# Patient Record
Sex: FEMALE | Race: WHITE | NOT HISPANIC OR LATINO | Employment: OTHER | RURAL
[De-identification: names, ages, dates, MRNs, and addresses within clinical notes are randomized per-mention and may not be internally consistent; named-entity substitution may affect disease eponyms.]

---

## 2013-03-26 LAB — CRC RECOMMENDATION EXT: NORMAL

## 2020-03-27 ENCOUNTER — HISTORICAL (OUTPATIENT)
Dept: ADMINISTRATIVE | Facility: HOSPITAL | Age: 60
End: 2020-03-27

## 2020-07-22 ENCOUNTER — HISTORICAL (OUTPATIENT)
Dept: ADMINISTRATIVE | Facility: HOSPITAL | Age: 60
End: 2020-07-22

## 2020-12-09 ENCOUNTER — HISTORICAL (OUTPATIENT)
Dept: ADMINISTRATIVE | Facility: HOSPITAL | Age: 60
End: 2020-12-09

## 2021-02-01 ENCOUNTER — HISTORICAL (OUTPATIENT)
Dept: ADMINISTRATIVE | Facility: HOSPITAL | Age: 61
End: 2021-02-01

## 2021-06-09 ENCOUNTER — OFFICE VISIT (OUTPATIENT)
Dept: FAMILY MEDICINE | Facility: CLINIC | Age: 61
End: 2021-06-09
Payer: MEDICARE

## 2021-06-09 VITALS
SYSTOLIC BLOOD PRESSURE: 142 MMHG | RESPIRATION RATE: 20 BRPM | BODY MASS INDEX: 27.11 KG/M2 | TEMPERATURE: 98 F | WEIGHT: 158.81 LBS | DIASTOLIC BLOOD PRESSURE: 82 MMHG | HEART RATE: 64 BPM | HEIGHT: 64 IN | OXYGEN SATURATION: 98 %

## 2021-06-09 DIAGNOSIS — Z00.00 ENCOUNTER FOR SUBSEQUENT ANNUAL WELLNESS VISIT IN MEDICARE PATIENT: Primary | ICD-10-CM

## 2021-06-09 DIAGNOSIS — Z12.31 ENCOUNTER FOR SCREENING MAMMOGRAM FOR MALIGNANT NEOPLASM OF BREAST: ICD-10-CM

## 2021-06-09 DIAGNOSIS — M79.7 FIBROMYALGIA: ICD-10-CM

## 2021-06-09 DIAGNOSIS — F32.A DEPRESSION, UNSPECIFIED DEPRESSION TYPE: ICD-10-CM

## 2021-06-09 DIAGNOSIS — I10 ESSENTIAL HYPERTENSION: ICD-10-CM

## 2021-06-09 PROCEDURE — 1160F PR REVIEW ALL MEDS BY PRESCRIBER/CLIN PHARMACIST DOCUMENTED: ICD-10-PCS | Mod: ,,, | Performed by: NURSE PRACTITIONER

## 2021-06-09 PROCEDURE — 3008F BODY MASS INDEX DOCD: CPT | Mod: CPTII,,, | Performed by: NURSE PRACTITIONER

## 2021-06-09 PROCEDURE — G0439 PR MEDICARE ANNUAL WELLNESS SUBSEQUENT VISIT: ICD-10-PCS | Mod: ,,, | Performed by: NURSE PRACTITIONER

## 2021-06-09 PROCEDURE — G0444 DEPRESSION SCREEN ANNUAL: HCPCS | Mod: ,,, | Performed by: NURSE PRACTITIONER

## 2021-06-09 PROCEDURE — 1170F FXNL STATUS ASSESSED: CPT | Mod: ,,, | Performed by: NURSE PRACTITIONER

## 2021-06-09 PROCEDURE — 1125F AMNT PAIN NOTED PAIN PRSNT: CPT | Mod: ,,, | Performed by: NURSE PRACTITIONER

## 2021-06-09 PROCEDURE — G0439 PPPS, SUBSEQ VISIT: HCPCS | Mod: ,,, | Performed by: NURSE PRACTITIONER

## 2021-06-09 PROCEDURE — G0444 PR DEPRESSION SCREENING: ICD-10-PCS | Mod: ,,, | Performed by: NURSE PRACTITIONER

## 2021-06-09 PROCEDURE — 3008F PR BODY MASS INDEX (BMI) DOCUMENTED: ICD-10-PCS | Mod: CPTII,,, | Performed by: NURSE PRACTITIONER

## 2021-06-09 PROCEDURE — 3077F PR MOST RECENT SYSTOLIC BLOOD PRESSURE >= 140 MM HG: ICD-10-PCS | Mod: CPTII,,, | Performed by: NURSE PRACTITIONER

## 2021-06-09 PROCEDURE — 1170F PR FUNCTIONAL STATUS ASSESSED: ICD-10-PCS | Mod: ,,, | Performed by: NURSE PRACTITIONER

## 2021-06-09 PROCEDURE — 3079F DIAST BP 80-89 MM HG: CPT | Mod: CPTII,,, | Performed by: NURSE PRACTITIONER

## 2021-06-09 PROCEDURE — 3079F PR MOST RECENT DIASTOLIC BLOOD PRESSURE 80-89 MM HG: ICD-10-PCS | Mod: CPTII,,, | Performed by: NURSE PRACTITIONER

## 2021-06-09 PROCEDURE — 1036F PR CURRENT TOBACCO NON-USER: ICD-10-PCS | Mod: ,,, | Performed by: NURSE PRACTITIONER

## 2021-06-09 PROCEDURE — 1036F TOBACCO NON-USER: CPT | Mod: ,,, | Performed by: NURSE PRACTITIONER

## 2021-06-09 PROCEDURE — 1160F RVW MEDS BY RX/DR IN RCRD: CPT | Mod: ,,, | Performed by: NURSE PRACTITIONER

## 2021-06-09 PROCEDURE — 3077F SYST BP >= 140 MM HG: CPT | Mod: CPTII,,, | Performed by: NURSE PRACTITIONER

## 2021-06-09 PROCEDURE — 1125F PR PAIN SEVERITY QUANTIFIED, PAIN PRESENT: ICD-10-PCS | Mod: ,,, | Performed by: NURSE PRACTITIONER

## 2021-06-09 RX ORDER — IBUPROFEN 600 MG/1
600 TABLET ORAL 3 TIMES DAILY PRN
COMMUNITY
End: 2021-07-06 | Stop reason: SDUPTHER

## 2021-06-09 RX ORDER — LEVOTHYROXINE SODIUM 88 UG/1
1 TABLET ORAL DAILY
COMMUNITY
Start: 2021-03-30 | End: 2021-07-07 | Stop reason: SDUPTHER

## 2021-06-09 RX ORDER — BUPROPION HYDROCHLORIDE 150 MG/1
1 TABLET ORAL DAILY
COMMUNITY
Start: 2021-03-30 | End: 2021-07-06 | Stop reason: SDUPTHER

## 2021-06-09 RX ORDER — POTASSIUM CHLORIDE 750 MG/1
1 CAPSULE, EXTENDED RELEASE ORAL DAILY
COMMUNITY
Start: 2021-03-30 | End: 2021-07-06 | Stop reason: SDUPTHER

## 2021-06-09 RX ORDER — BENAZEPRIL HYDROCHLORIDE 40 MG/1
40 TABLET ORAL NIGHTLY
COMMUNITY
End: 2021-07-06 | Stop reason: SDUPTHER

## 2021-06-09 RX ORDER — SPIRONOLACTONE 25 MG/1
25 TABLET ORAL DAILY
COMMUNITY
Start: 2021-03-30 | End: 2021-07-06 | Stop reason: SDUPTHER

## 2021-06-09 RX ORDER — CARVEDILOL 25 MG/1
.5-1 TABLET ORAL 2 TIMES DAILY
COMMUNITY
Start: 2021-03-30 | End: 2021-07-06 | Stop reason: SDUPTHER

## 2021-06-09 RX ORDER — HYDROXYZINE HYDROCHLORIDE 25 MG/1
25 TABLET, FILM COATED ORAL NIGHTLY
COMMUNITY
End: 2021-07-06 | Stop reason: SDUPTHER

## 2021-06-09 RX ORDER — ALENDRONATE SODIUM 10 MG/1
35 TABLET ORAL WEEKLY
COMMUNITY
End: 2021-07-06 | Stop reason: SDUPTHER

## 2021-06-09 RX ORDER — ASPIRIN 81 MG/1
81 TABLET ORAL DAILY
COMMUNITY

## 2021-06-09 RX ORDER — FERROUS SULFATE 325(65) MG
3 TABLET ORAL DAILY
COMMUNITY
End: 2022-04-19 | Stop reason: SDUPTHER

## 2021-06-09 RX ORDER — DULAGLUTIDE 0.75 MG/.5ML
0.75 INJECTION, SOLUTION SUBCUTANEOUS WEEKLY
COMMUNITY
Start: 2021-03-17 | End: 2021-07-06 | Stop reason: SDUPTHER

## 2021-06-09 RX ORDER — ATORVASTATIN CALCIUM 80 MG/1
80 TABLET, FILM COATED ORAL DAILY
COMMUNITY
End: 2021-07-06 | Stop reason: SDUPTHER

## 2021-06-09 RX ORDER — TIZANIDINE 4 MG/1
4 TABLET ORAL 2 TIMES DAILY PRN
COMMUNITY
End: 2021-07-06 | Stop reason: SDUPTHER

## 2021-06-09 RX ORDER — TOPIRAMATE 50 MG/1
50 TABLET, FILM COATED ORAL 2 TIMES DAILY
COMMUNITY
Start: 2021-03-30 | End: 2021-07-06 | Stop reason: SDUPTHER

## 2021-06-09 RX ORDER — LACTULOSE 10 G/15ML
10 SOLUTION ORAL; RECTAL 4 TIMES DAILY PRN
COMMUNITY
End: 2021-07-06 | Stop reason: SDUPTHER

## 2021-06-09 RX ORDER — CHOLECALCIFEROL (VITAMIN D3) 25 MCG
1000 TABLET ORAL DAILY
COMMUNITY
End: 2022-04-19 | Stop reason: SDUPTHER

## 2021-07-06 ENCOUNTER — OFFICE VISIT (OUTPATIENT)
Dept: FAMILY MEDICINE | Facility: CLINIC | Age: 61
End: 2021-07-06
Payer: MEDICARE

## 2021-07-06 VITALS
OXYGEN SATURATION: 99 % | HEART RATE: 60 BPM | SYSTOLIC BLOOD PRESSURE: 148 MMHG | BODY MASS INDEX: 26.33 KG/M2 | HEIGHT: 65 IN | TEMPERATURE: 98 F | DIASTOLIC BLOOD PRESSURE: 72 MMHG | WEIGHT: 158 LBS | RESPIRATION RATE: 18 BRPM

## 2021-07-06 DIAGNOSIS — I10 ESSENTIAL HYPERTENSION: Primary | ICD-10-CM

## 2021-07-06 DIAGNOSIS — M79.7 FIBROMYALGIA: ICD-10-CM

## 2021-07-06 DIAGNOSIS — M85.80 OSTEOPENIA, UNSPECIFIED LOCATION: ICD-10-CM

## 2021-07-06 DIAGNOSIS — I51.9 HEART DISEASE: ICD-10-CM

## 2021-07-06 DIAGNOSIS — F41.9 ANXIETY: ICD-10-CM

## 2021-07-06 DIAGNOSIS — E03.9 HYPOTHYROIDISM, UNSPECIFIED TYPE: ICD-10-CM

## 2021-07-06 DIAGNOSIS — F32.A DEPRESSION, UNSPECIFIED DEPRESSION TYPE: ICD-10-CM

## 2021-07-06 PROBLEM — M81.0 OSTEOPOROSIS: Status: ACTIVE | Noted: 2021-07-06

## 2021-07-06 PROBLEM — M81.0 OSTEOPOROSIS: Status: RESOLVED | Noted: 2021-07-06 | Resolved: 2021-07-06

## 2021-07-06 LAB
ALBUMIN SERPL BCP-MCNC: 3.8 G/DL (ref 3.5–5)
ALBUMIN/GLOB SERPL: 1.3 {RATIO}
ALP SERPL-CCNC: 42 U/L (ref 50–130)
ALT SERPL W P-5'-P-CCNC: 40 U/L (ref 13–56)
ANION GAP SERPL CALCULATED.3IONS-SCNC: 8 MMOL/L (ref 7–16)
AST SERPL W P-5'-P-CCNC: 36 U/L (ref 15–37)
BILIRUB SERPL-MCNC: 0.3 MG/DL (ref 0–1.2)
BUN SERPL-MCNC: 17 MG/DL (ref 7–18)
BUN/CREAT SERPL: 18 (ref 6–20)
CALCIUM SERPL-MCNC: 8.6 MG/DL (ref 8.5–10.1)
CHLORIDE SERPL-SCNC: 105 MMOL/L (ref 98–107)
CHOLEST SERPL-MCNC: 151 MG/DL (ref 0–200)
CHOLEST/HDLC SERPL: 2.5 {RATIO}
CK SERPL-CCNC: 386 U/L (ref 26–192)
CO2 SERPL-SCNC: 29 MMOL/L (ref 21–32)
CREAT SERPL-MCNC: 0.97 MG/DL (ref 0.55–1.02)
GLOBULIN SER-MCNC: 3 G/DL (ref 2–4)
GLUCOSE SERPL-MCNC: 92 MG/DL (ref 74–106)
HDLC SERPL-MCNC: 60 MG/DL (ref 40–60)
LDLC SERPL CALC-MCNC: 69 MG/DL
LDLC/HDLC SERPL: 1.2 {RATIO}
NONHDLC SERPL-MCNC: 91 MG/DL
POTASSIUM SERPL-SCNC: 4.2 MMOL/L (ref 3.5–5.1)
PROT SERPL-MCNC: 6.8 G/DL (ref 6.4–8.2)
SODIUM SERPL-SCNC: 138 MMOL/L (ref 136–145)
TRIGL SERPL-MCNC: 108 MG/DL (ref 35–150)
TSH SERPL DL<=0.005 MIU/L-ACNC: 4.11 UIU/ML (ref 0.36–3.74)
VLDLC SERPL-MCNC: 22 MG/DL

## 2021-07-06 PROCEDURE — 82550 CK: ICD-10-PCS | Mod: ,,, | Performed by: CLINICAL MEDICAL LABORATORY

## 2021-07-06 PROCEDURE — 84443 ASSAY THYROID STIM HORMONE: CPT | Mod: ,,, | Performed by: CLINICAL MEDICAL LABORATORY

## 2021-07-06 PROCEDURE — 99214 PR OFFICE/OUTPT VISIT, EST, LEVL IV, 30-39 MIN: ICD-10-PCS | Mod: ,,, | Performed by: NURSE PRACTITIONER

## 2021-07-06 PROCEDURE — 80061 LIPID PANEL: CPT | Mod: ,,, | Performed by: CLINICAL MEDICAL LABORATORY

## 2021-07-06 PROCEDURE — 1125F AMNT PAIN NOTED PAIN PRSNT: CPT | Mod: ,,, | Performed by: NURSE PRACTITIONER

## 2021-07-06 PROCEDURE — 1125F PR PAIN SEVERITY QUANTIFIED, PAIN PRESENT: ICD-10-PCS | Mod: ,,, | Performed by: NURSE PRACTITIONER

## 2021-07-06 PROCEDURE — 3008F BODY MASS INDEX DOCD: CPT | Mod: ,,, | Performed by: NURSE PRACTITIONER

## 2021-07-06 PROCEDURE — 82550 ASSAY OF CK (CPK): CPT | Mod: ,,, | Performed by: CLINICAL MEDICAL LABORATORY

## 2021-07-06 PROCEDURE — 84443 TSH: ICD-10-PCS | Mod: ,,, | Performed by: CLINICAL MEDICAL LABORATORY

## 2021-07-06 PROCEDURE — 80061 LIPID PANEL: ICD-10-PCS | Mod: ,,, | Performed by: CLINICAL MEDICAL LABORATORY

## 2021-07-06 PROCEDURE — 3008F PR BODY MASS INDEX (BMI) DOCUMENTED: ICD-10-PCS | Mod: ,,, | Performed by: NURSE PRACTITIONER

## 2021-07-06 PROCEDURE — 99214 OFFICE O/P EST MOD 30 MIN: CPT | Mod: ,,, | Performed by: NURSE PRACTITIONER

## 2021-07-06 PROCEDURE — 80053 COMPREHEN METABOLIC PANEL: CPT | Mod: ,,, | Performed by: CLINICAL MEDICAL LABORATORY

## 2021-07-06 PROCEDURE — 80053 COMPREHENSIVE METABOLIC PANEL: ICD-10-PCS | Mod: ,,, | Performed by: CLINICAL MEDICAL LABORATORY

## 2021-07-06 RX ORDER — LORATADINE 10 MG/1
10 TABLET ORAL DAILY
Qty: 90 TABLET | Refills: 1 | Status: SHIPPED | OUTPATIENT
Start: 2021-07-06 | End: 2021-10-06

## 2021-07-06 RX ORDER — ATORVASTATIN CALCIUM 80 MG/1
80 TABLET, FILM COATED ORAL DAILY
Qty: 90 TABLET | Refills: 1 | Status: SHIPPED | OUTPATIENT
Start: 2021-07-06 | End: 2021-10-06

## 2021-07-06 RX ORDER — BUPROPION HYDROCHLORIDE 150 MG/1
150 TABLET ORAL DAILY
Qty: 90 TABLET | Refills: 1 | Status: SHIPPED | OUTPATIENT
Start: 2021-07-06 | End: 2021-10-06

## 2021-07-06 RX ORDER — HYDROXYZINE HYDROCHLORIDE 25 MG/1
25 TABLET, FILM COATED ORAL NIGHTLY
Qty: 90 TABLET | Refills: 1 | Status: SHIPPED | OUTPATIENT
Start: 2021-07-06 | End: 2021-10-06

## 2021-07-06 RX ORDER — BENAZEPRIL HYDROCHLORIDE 40 MG/1
40 TABLET ORAL NIGHTLY
Qty: 90 TABLET | Refills: 1 | Status: SHIPPED | OUTPATIENT
Start: 2021-07-06 | End: 2021-10-06

## 2021-07-06 RX ORDER — TIZANIDINE 4 MG/1
4 TABLET ORAL 2 TIMES DAILY PRN
Qty: 180 TABLET | Refills: 1 | Status: SHIPPED | OUTPATIENT
Start: 2021-07-06 | End: 2021-10-06

## 2021-07-06 RX ORDER — LACTULOSE 10 G/15ML
10 SOLUTION ORAL; RECTAL 4 TIMES DAILY PRN
Qty: 946 ML | Refills: 1 | Status: SHIPPED | OUTPATIENT
Start: 2021-07-06 | End: 2022-02-01

## 2021-07-06 RX ORDER — TOPIRAMATE 50 MG/1
50 TABLET, FILM COATED ORAL 2 TIMES DAILY
Qty: 180 TABLET | Refills: 1 | Status: SHIPPED | OUTPATIENT
Start: 2021-07-06 | End: 2021-10-06

## 2021-07-06 RX ORDER — POTASSIUM CHLORIDE 750 MG/1
10 CAPSULE, EXTENDED RELEASE ORAL DAILY
Qty: 90 CAPSULE | Refills: 1 | Status: SHIPPED | OUTPATIENT
Start: 2021-07-06 | End: 2021-10-06

## 2021-07-06 RX ORDER — DULAGLUTIDE 0.75 MG/.5ML
0.75 INJECTION, SOLUTION SUBCUTANEOUS WEEKLY
Qty: 3 PEN | Refills: 1 | Status: SHIPPED | OUTPATIENT
Start: 2021-07-06 | End: 2022-04-19

## 2021-07-06 RX ORDER — ALENDRONATE SODIUM 10 MG/1
40 TABLET ORAL WEEKLY
Qty: 12 TABLET | Refills: 1 | Status: SHIPPED | OUTPATIENT
Start: 2021-07-06 | End: 2022-02-01

## 2021-07-06 RX ORDER — SPIRONOLACTONE 25 MG/1
25 TABLET ORAL DAILY
Qty: 90 TABLET | Refills: 1 | Status: SHIPPED | OUTPATIENT
Start: 2021-07-06 | End: 2021-10-06

## 2021-07-06 RX ORDER — LORATADINE 10 MG/1
10 TABLET ORAL DAILY
COMMUNITY
End: 2021-07-06 | Stop reason: SDUPTHER

## 2021-07-06 RX ORDER — CARVEDILOL 25 MG/1
12.5-25 TABLET ORAL 2 TIMES DAILY
Qty: 180 TABLET | Refills: 1 | Status: SHIPPED | OUTPATIENT
Start: 2021-07-06 | End: 2021-10-06

## 2021-07-06 RX ORDER — IBUPROFEN 600 MG/1
600 TABLET ORAL 3 TIMES DAILY PRN
Qty: 270 TABLET | Refills: 1 | Status: SHIPPED | OUTPATIENT
Start: 2021-07-06 | End: 2022-02-01

## 2021-07-07 ENCOUNTER — TELEPHONE (OUTPATIENT)
Dept: FAMILY MEDICINE | Facility: CLINIC | Age: 61
End: 2021-07-07

## 2021-07-07 DIAGNOSIS — E03.9 HYPOTHYROIDISM, UNSPECIFIED TYPE: Primary | ICD-10-CM

## 2021-07-07 RX ORDER — LEVOTHYROXINE SODIUM 88 UG/1
88 TABLET ORAL DAILY
Qty: 60 TABLET | Refills: 0 | Status: SHIPPED | OUTPATIENT
Start: 2021-07-07 | End: 2021-09-03 | Stop reason: DRUGHIGH

## 2021-07-13 RX ORDER — DICLOFENAC SODIUM 75 MG/1
75 TABLET, DELAYED RELEASE ORAL 2 TIMES DAILY PRN
COMMUNITY
End: 2021-07-13 | Stop reason: SDUPTHER

## 2021-07-13 RX ORDER — DICLOFENAC SODIUM 75 MG/1
75 TABLET, DELAYED RELEASE ORAL 2 TIMES DAILY PRN
Qty: 120 TABLET | Refills: 0 | Status: SHIPPED | OUTPATIENT
Start: 2021-07-13 | End: 2021-10-06

## 2021-08-20 ENCOUNTER — HOSPITAL ENCOUNTER (OUTPATIENT)
Dept: RADIOLOGY | Facility: HOSPITAL | Age: 61
Discharge: HOME | End: 2021-08-20
Attending: NURSE PRACTITIONER
Payer: MEDICARE

## 2021-08-20 ENCOUNTER — OFFICE VISIT (OUTPATIENT)
Dept: FAMILY MEDICINE | Facility: CLINIC | Age: 61
End: 2021-08-20
Payer: MEDICARE

## 2021-08-20 VITALS
SYSTOLIC BLOOD PRESSURE: 140 MMHG | TEMPERATURE: 98 F | HEART RATE: 69 BPM | OXYGEN SATURATION: 99 % | WEIGHT: 156.25 LBS | DIASTOLIC BLOOD PRESSURE: 80 MMHG | RESPIRATION RATE: 18 BRPM | HEIGHT: 65 IN | BODY MASS INDEX: 26.03 KG/M2

## 2021-08-20 DIAGNOSIS — E27.8 MASS OF ADRENAL GLAND: ICD-10-CM

## 2021-08-20 DIAGNOSIS — R22.2 NODULE OF SKIN OF ABDOMEN: ICD-10-CM

## 2021-08-20 DIAGNOSIS — K43.9 VENTRAL HERNIA WITHOUT OBSTRUCTION OR GANGRENE: ICD-10-CM

## 2021-08-20 DIAGNOSIS — K43.9 VENTRAL HERNIA WITHOUT OBSTRUCTION OR GANGRENE: Primary | ICD-10-CM

## 2021-08-20 DIAGNOSIS — R59.9 ENLARGED LYMPH NODE: ICD-10-CM

## 2021-08-20 DIAGNOSIS — K42.9 UMBILICAL HERNIA WITHOUT OBSTRUCTION AND WITHOUT GANGRENE: Primary | ICD-10-CM

## 2021-08-20 DIAGNOSIS — K42.9 UMBILICAL HERNIA WITHOUT OBSTRUCTION AND WITHOUT GANGRENE: ICD-10-CM

## 2021-08-20 LAB
BASOPHILS # BLD AUTO: 0.03 K/UL (ref 0–0.2)
BASOPHILS NFR BLD AUTO: 0.5 % (ref 0–1)
CK SERPL-CCNC: 131 U/L (ref 26–192)
CREAT SERPL-MCNC: 0.97 MG/DL (ref 0.55–1.02)
DIFFERENTIAL METHOD BLD: ABNORMAL
EOSINOPHIL # BLD AUTO: 0.07 K/UL (ref 0–0.5)
EOSINOPHIL NFR BLD AUTO: 1.1 % (ref 1–4)
ERYTHROCYTE [DISTWIDTH] IN BLOOD BY AUTOMATED COUNT: 12.8 % (ref 11.5–14.5)
HCT VFR BLD AUTO: 33.6 % (ref 38–47)
HGB BLD-MCNC: 11.9 G/DL (ref 12–16)
LYMPHOCYTES # BLD AUTO: 2.33 K/UL (ref 1–4.8)
LYMPHOCYTES NFR BLD AUTO: 37.9 % (ref 27–41)
MCH RBC QN AUTO: 30.6 PG (ref 27–31)
MCHC RBC AUTO-ENTMCNC: 35.4 G/DL (ref 32–36)
MCV RBC AUTO: 86.4 FL (ref 80–96)
MONOCYTES # BLD AUTO: 0.56 K/UL (ref 0–0.8)
MONOCYTES NFR BLD AUTO: 9.1 % (ref 2–6)
MPC BLD CALC-MCNC: 10.3 FL (ref 9.4–12.4)
NEUTROPHILS # BLD AUTO: 3.15 K/UL (ref 1.8–7.7)
NEUTROPHILS NFR BLD AUTO: 51.4 % (ref 53–65)
PLATELET # BLD AUTO: 157 K/UL (ref 150–400)
RBC # BLD AUTO: 3.89 M/UL (ref 4.2–5.4)
WBC # BLD AUTO: 6.14 K/UL (ref 4.5–11)

## 2021-08-20 PROCEDURE — 3077F SYST BP >= 140 MM HG: CPT | Performed by: NURSE PRACTITIONER

## 2021-08-20 PROCEDURE — 99214 OFFICE O/P EST MOD 30 MIN: CPT | Mod: 25 | Performed by: NURSE PRACTITIONER

## 2021-08-20 PROCEDURE — 25500020 PHARM REV CODE 255: Performed by: NURSE PRACTITIONER

## 2021-08-20 PROCEDURE — 1159F MED LIST DOCD IN RCRD: CPT | Performed by: NURSE PRACTITIONER

## 2021-08-20 PROCEDURE — 82550 ASSAY OF CK (CPK): CPT | Performed by: NURSE PRACTITIONER

## 2021-08-20 PROCEDURE — 3008F BODY MASS INDEX DOCD: CPT | Performed by: NURSE PRACTITIONER

## 2021-08-20 PROCEDURE — 82565 ASSAY OF CREATININE: CPT | Performed by: NURSE PRACTITIONER

## 2021-08-20 PROCEDURE — 3079F DIAST BP 80-89 MM HG: CPT | Performed by: NURSE PRACTITIONER

## 2021-08-20 PROCEDURE — 74177 CT ABD & PELVIS W/CONTRAST: CPT | Mod: TC

## 2021-08-20 RX ADMIN — IOPAMIDOL 100 ML: 755 INJECTION, SOLUTION INTRAVENOUS at 04:08

## 2021-08-30 PROBLEM — K43.9 VENTRAL HERNIA WITHOUT OBSTRUCTION OR GANGRENE: Status: ACTIVE | Noted: 2021-08-30

## 2021-08-30 PROBLEM — K42.9 UMBILICAL HERNIA WITHOUT OBSTRUCTION AND WITHOUT GANGRENE: Status: ACTIVE | Noted: 2021-08-30

## 2021-08-30 PROBLEM — R59.9 ENLARGED LYMPH NODE: Status: ACTIVE | Noted: 2021-08-30

## 2021-09-02 DIAGNOSIS — E03.9 HYPOTHYROIDISM, UNSPECIFIED TYPE: ICD-10-CM

## 2021-09-02 DIAGNOSIS — I10 ESSENTIAL HYPERTENSION: Primary | ICD-10-CM

## 2021-09-02 LAB
CK SERPL-CCNC: 179 U/L (ref 26–192)
TSH SERPL DL<=0.005 MIU/L-ACNC: 0.15 UIU/ML (ref 0.36–3.74)

## 2021-09-02 PROCEDURE — 82550 ASSAY OF CK (CPK): CPT | Mod: ,,, | Performed by: CLINICAL MEDICAL LABORATORY

## 2021-09-02 PROCEDURE — 84443 TSH: ICD-10-PCS | Mod: ,,, | Performed by: CLINICAL MEDICAL LABORATORY

## 2021-09-02 PROCEDURE — 84443 ASSAY THYROID STIM HORMONE: CPT | Mod: ,,, | Performed by: CLINICAL MEDICAL LABORATORY

## 2021-09-02 PROCEDURE — 82550 CK: ICD-10-PCS | Mod: ,,, | Performed by: CLINICAL MEDICAL LABORATORY

## 2021-09-03 ENCOUNTER — TELEPHONE (OUTPATIENT)
Dept: FAMILY MEDICINE | Facility: CLINIC | Age: 61
End: 2021-09-03

## 2021-09-03 DIAGNOSIS — E03.9 HYPOTHYROIDISM, UNSPECIFIED TYPE: Primary | ICD-10-CM

## 2021-09-03 RX ORDER — LEVOTHYROXINE SODIUM 50 UG/1
50 TABLET ORAL
Qty: 45 TABLET | Refills: 0 | Status: SHIPPED | OUTPATIENT
Start: 2021-09-03 | End: 2021-11-02 | Stop reason: SDUPTHER

## 2021-09-03 RX ORDER — LEVOTHYROXINE SODIUM 50 UG/1
50 TABLET ORAL
COMMUNITY
End: 2021-09-03 | Stop reason: SDUPTHER

## 2021-09-07 ENCOUNTER — CLINICAL SUPPORT (OUTPATIENT)
Dept: CARDIOLOGY | Facility: CLINIC | Age: 61
End: 2021-09-07
Attending: SURGERY
Payer: MEDICARE

## 2021-09-07 ENCOUNTER — OFFICE VISIT (OUTPATIENT)
Dept: SURGERY | Facility: CLINIC | Age: 61
End: 2021-09-07
Attending: SURGERY
Payer: MEDICARE

## 2021-09-07 DIAGNOSIS — K43.9 VENTRAL HERNIA WITHOUT OBSTRUCTION OR GANGRENE: ICD-10-CM

## 2021-09-07 DIAGNOSIS — E27.8 MASS OF ADRENAL GLAND: ICD-10-CM

## 2021-09-07 DIAGNOSIS — K42.9 UMBILICAL HERNIA WITHOUT OBSTRUCTION AND WITHOUT GANGRENE: Primary | ICD-10-CM

## 2021-09-07 DIAGNOSIS — K42.9 UMBILICAL HERNIA WITHOUT OBSTRUCTION AND WITHOUT GANGRENE: ICD-10-CM

## 2021-09-07 PROCEDURE — 99214 OFFICE O/P EST MOD 30 MIN: CPT | Mod: PBBFAC | Performed by: SURGERY

## 2021-09-07 PROCEDURE — 99205 PR OFFICE/OUTPT VISIT, NEW, LEVL V, 60-74 MIN: ICD-10-PCS | Mod: S$PBB,,, | Performed by: SURGERY

## 2021-09-07 PROCEDURE — 1159F PR MEDICATION LIST DOCUMENTED IN MEDICAL RECORD: ICD-10-PCS | Mod: CPTII,,, | Performed by: SURGERY

## 2021-09-07 PROCEDURE — 99212 OFFICE O/P EST SF 10 MIN: CPT | Mod: PBBFAC

## 2021-09-07 PROCEDURE — 99205 OFFICE O/P NEW HI 60 MIN: CPT | Mod: S$PBB,,, | Performed by: SURGERY

## 2021-09-07 PROCEDURE — 93010 EKG 12-LEAD: ICD-10-PCS | Mod: S$PBB,,, | Performed by: INTERNAL MEDICINE

## 2021-09-07 PROCEDURE — 93010 ELECTROCARDIOGRAM REPORT: CPT | Mod: S$PBB,,, | Performed by: INTERNAL MEDICINE

## 2021-09-07 PROCEDURE — 4010F ACE/ARB THERAPY RXD/TAKEN: CPT | Mod: CPTII,,, | Performed by: SURGERY

## 2021-09-07 PROCEDURE — 4010F PR ACE/ARB THEARPY RXD/TAKEN: ICD-10-PCS | Mod: CPTII,,, | Performed by: SURGERY

## 2021-09-07 PROCEDURE — 1159F MED LIST DOCD IN RCRD: CPT | Mod: CPTII,,, | Performed by: SURGERY

## 2021-09-07 PROCEDURE — 93005 ELECTROCARDIOGRAM TRACING: CPT | Mod: PBBFAC | Performed by: INTERNAL MEDICINE

## 2021-09-09 ENCOUNTER — HOSPITAL ENCOUNTER (OUTPATIENT)
Dept: RADIOLOGY | Facility: HOSPITAL | Age: 61
Discharge: HOME | End: 2021-09-09
Attending: NURSE PRACTITIONER
Payer: MEDICARE

## 2021-09-09 VITALS — WEIGHT: 156 LBS | HEIGHT: 65 IN | BODY MASS INDEX: 25.99 KG/M2

## 2021-09-09 DIAGNOSIS — Z12.31 ENCOUNTER FOR SCREENING MAMMOGRAM FOR MALIGNANT NEOPLASM OF BREAST: ICD-10-CM

## 2021-09-09 PROCEDURE — 77067 SCR MAMMO BI INCL CAD: CPT | Mod: TC

## 2021-09-09 PROCEDURE — 77067 SCR MAMMO BI INCL CAD: CPT | Mod: 26,,, | Performed by: RADIOLOGY

## 2021-09-09 PROCEDURE — 77067 MAMMO DIGITAL SCREENING BILAT: ICD-10-PCS | Mod: 26,,, | Performed by: RADIOLOGY

## 2021-09-10 PROBLEM — E27.8 MASS OF ADRENAL GLAND: Status: ACTIVE | Noted: 2021-09-10

## 2021-09-16 ENCOUNTER — APPOINTMENT (OUTPATIENT)
Dept: RADIOLOGY | Facility: CLINIC | Age: 61
End: 2021-09-16
Attending: NURSE PRACTITIONER
Payer: MEDICARE

## 2021-09-16 ENCOUNTER — OFFICE VISIT (OUTPATIENT)
Dept: FAMILY MEDICINE | Facility: CLINIC | Age: 61
End: 2021-09-16
Payer: MEDICARE

## 2021-09-16 VITALS
TEMPERATURE: 98 F | HEART RATE: 74 BPM | WEIGHT: 154.25 LBS | SYSTOLIC BLOOD PRESSURE: 160 MMHG | OXYGEN SATURATION: 99 % | RESPIRATION RATE: 18 BRPM | BODY MASS INDEX: 25.7 KG/M2 | DIASTOLIC BLOOD PRESSURE: 78 MMHG | HEIGHT: 65 IN

## 2021-09-16 DIAGNOSIS — S99.922A INJURY OF TOE ON LEFT FOOT, INITIAL ENCOUNTER: ICD-10-CM

## 2021-09-16 DIAGNOSIS — S90.122A CONTUSION OF FOOT INCLUDING TOES, LEFT, INITIAL ENCOUNTER: Primary | ICD-10-CM

## 2021-09-16 DIAGNOSIS — Z79.899 HIGH RISK MEDICATION USE: ICD-10-CM

## 2021-09-16 DIAGNOSIS — K42.9 UMBILICAL HERNIA WITHOUT OBSTRUCTION AND WITHOUT GANGRENE: Primary | ICD-10-CM

## 2021-09-16 DIAGNOSIS — S90.32XA CONTUSION OF FOOT INCLUDING TOES, LEFT, INITIAL ENCOUNTER: Primary | ICD-10-CM

## 2021-09-16 LAB
CTP QC/QA: YES
POC (AMP) AMPHETAMINE: NEGATIVE
POC (BAR) BARBITURATES: NEGATIVE
POC (BUP) BUPRENORPHINE: NEGATIVE
POC (BZO) BENZODIAZEPINES: NEGATIVE
POC (COC) COCAINE: NEGATIVE
POC (MDMA) METHYLENEDIOXYMETHAMPHETAMINE 3,4: NEGATIVE
POC (MET) METHAMPHETAMINE: NEGATIVE
POC (MOP) OPIATES: NEGATIVE
POC (MTD) METHADONE: NEGATIVE
POC (OXY) OXYCODONE: NEGATIVE
POC (PCP) PHENCYCLIDINE: NEGATIVE
POC (TCA) TRICYCLIC ANTIDEPRESSANTS: NEGATIVE
POC TEMPERATURE (URINE): 90
POC THC: NEGATIVE

## 2021-09-16 PROCEDURE — 99214 OFFICE O/P EST MOD 30 MIN: CPT | Mod: ,,, | Performed by: NURSE PRACTITIONER

## 2021-09-16 PROCEDURE — 4010F ACE/ARB THERAPY RXD/TAKEN: CPT | Mod: ,,, | Performed by: NURSE PRACTITIONER

## 2021-09-16 PROCEDURE — 3077F SYST BP >= 140 MM HG: CPT | Mod: ,,, | Performed by: NURSE PRACTITIONER

## 2021-09-16 PROCEDURE — 73630 XR FOOT COMPLETE 3 VIEW LEFT: ICD-10-PCS | Mod: 26,LT,, | Performed by: RADIOLOGY

## 2021-09-16 PROCEDURE — 3008F BODY MASS INDEX DOCD: CPT | Mod: ,,, | Performed by: NURSE PRACTITIONER

## 2021-09-16 PROCEDURE — 1159F MED LIST DOCD IN RCRD: CPT | Mod: ,,, | Performed by: NURSE PRACTITIONER

## 2021-09-16 PROCEDURE — 80305 DRUG TEST PRSMV DIR OPT OBS: CPT | Mod: RHCUB | Performed by: NURSE PRACTITIONER

## 2021-09-16 PROCEDURE — 3078F DIAST BP <80 MM HG: CPT | Mod: ,,, | Performed by: NURSE PRACTITIONER

## 2021-09-16 PROCEDURE — 4010F PR ACE/ARB THEARPY RXD/TAKEN: ICD-10-PCS | Mod: ,,, | Performed by: NURSE PRACTITIONER

## 2021-09-16 PROCEDURE — 99214 PR OFFICE/OUTPT VISIT, EST, LEVL IV, 30-39 MIN: ICD-10-PCS | Mod: ,,, | Performed by: NURSE PRACTITIONER

## 2021-09-16 PROCEDURE — 73630 X-RAY EXAM OF FOOT: CPT | Mod: TC,RHCUB,LT | Performed by: NURSE PRACTITIONER

## 2021-09-16 PROCEDURE — 73630 X-RAY EXAM OF FOOT: CPT | Mod: 26,LT,, | Performed by: RADIOLOGY

## 2021-09-16 PROCEDURE — 1159F PR MEDICATION LIST DOCUMENTED IN MEDICAL RECORD: ICD-10-PCS | Mod: ,,, | Performed by: NURSE PRACTITIONER

## 2021-09-16 PROCEDURE — 3078F PR MOST RECENT DIASTOLIC BLOOD PRESSURE < 80 MM HG: ICD-10-PCS | Mod: ,,, | Performed by: NURSE PRACTITIONER

## 2021-09-16 PROCEDURE — 3077F PR MOST RECENT SYSTOLIC BLOOD PRESSURE >= 140 MM HG: ICD-10-PCS | Mod: ,,, | Performed by: NURSE PRACTITIONER

## 2021-09-16 PROCEDURE — 3008F PR BODY MASS INDEX (BMI) DOCUMENTED: ICD-10-PCS | Mod: ,,, | Performed by: NURSE PRACTITIONER

## 2021-09-16 RX ORDER — TRAMADOL HYDROCHLORIDE 50 MG/1
50 TABLET ORAL EVERY 6 HOURS
Qty: 21 TABLET | Refills: 0 | Status: SHIPPED | OUTPATIENT
Start: 2021-09-16 | End: 2021-09-16

## 2021-09-16 RX ORDER — TRAMADOL HYDROCHLORIDE 50 MG/1
50 TABLET ORAL EVERY 6 HOURS
Qty: 28 TABLET | Refills: 0 | Status: SHIPPED | OUTPATIENT
Start: 2021-09-16 | End: 2022-02-01

## 2021-09-17 DIAGNOSIS — E27.8 MASS OF ADRENAL GLAND: Primary | ICD-10-CM

## 2021-09-21 PROCEDURE — 83835 METANEPHRINES, FRACTIONATED 24 HOUR URINE: ICD-10-PCS | Mod: 90,,, | Performed by: CLINICAL MEDICAL LABORATORY

## 2021-09-21 PROCEDURE — 83835 ASSAY OF METANEPHRINES: CPT | Mod: 90,,, | Performed by: CLINICAL MEDICAL LABORATORY

## 2021-09-22 ENCOUNTER — TELEPHONE (OUTPATIENT)
Dept: SURGERY | Facility: CLINIC | Age: 61
End: 2021-09-22

## 2021-09-27 ENCOUNTER — OUTSIDE PLACE OF SERVICE (OUTPATIENT)
Dept: SURGERY | Facility: CLINIC | Age: 61
End: 2021-09-27
Payer: MEDICARE

## 2021-09-27 LAB
COLLECT DURATION TIME UR: 24 H
METANEPH 24H UR-MRATE: 98 MCG/24 H
METANEPHS 24H UR-MRATE: 293 MCG/24 H
NORMETANEPHRINE 24H UR-MRATE: 195 MCG/24 H
SPECIMEN VOL 24H UR: 3750 ML

## 2021-09-27 PROCEDURE — 49560 PR REPAIR INCISIONAL HERNIA,REDUCIBLE: ICD-10-PCS | Mod: ,,, | Performed by: SURGERY

## 2021-09-27 PROCEDURE — 49560 PR REPAIR INCISIONAL HERNIA,REDUCIBLE: CPT | Mod: ,,, | Performed by: SURGERY

## 2021-10-05 ENCOUNTER — OFFICE VISIT (OUTPATIENT)
Dept: SURGERY | Facility: CLINIC | Age: 61
End: 2021-10-05
Attending: SURGERY
Payer: MEDICARE

## 2021-10-05 DIAGNOSIS — Z09 POSTOP CHECK: Primary | ICD-10-CM

## 2021-10-05 PROCEDURE — 1159F PR MEDICATION LIST DOCUMENTED IN MEDICAL RECORD: ICD-10-PCS | Mod: CPTII,,, | Performed by: SURGERY

## 2021-10-05 PROCEDURE — 4010F ACE/ARB THERAPY RXD/TAKEN: CPT | Mod: CPTII,,, | Performed by: SURGERY

## 2021-10-05 PROCEDURE — 1160F RVW MEDS BY RX/DR IN RCRD: CPT | Mod: CPTII,,, | Performed by: SURGERY

## 2021-10-05 PROCEDURE — 4010F PR ACE/ARB THEARPY RXD/TAKEN: ICD-10-PCS | Mod: CPTII,,, | Performed by: SURGERY

## 2021-10-05 PROCEDURE — 1160F PR REVIEW ALL MEDS BY PRESCRIBER/CLIN PHARMACIST DOCUMENTED: ICD-10-PCS | Mod: CPTII,,, | Performed by: SURGERY

## 2021-10-05 PROCEDURE — 99213 OFFICE O/P EST LOW 20 MIN: CPT | Mod: PBBFAC | Performed by: SURGERY

## 2021-10-05 PROCEDURE — 99024 PR POST-OP FOLLOW-UP VISIT: ICD-10-PCS | Mod: ,,, | Performed by: SURGERY

## 2021-10-05 PROCEDURE — 99024 POSTOP FOLLOW-UP VISIT: CPT | Mod: ,,, | Performed by: SURGERY

## 2021-10-05 PROCEDURE — 1159F MED LIST DOCD IN RCRD: CPT | Mod: CPTII,,, | Performed by: SURGERY

## 2021-10-06 PROBLEM — Z09 POSTOP CHECK: Status: ACTIVE | Noted: 2021-10-06

## 2021-10-08 ENCOUNTER — CLINICAL SUPPORT (OUTPATIENT)
Dept: FAMILY MEDICINE | Facility: CLINIC | Age: 61
End: 2021-10-08
Payer: MEDICARE

## 2021-10-08 DIAGNOSIS — Z23 ENCOUNTER FOR IMMUNIZATION: Primary | ICD-10-CM

## 2021-10-08 PROCEDURE — 90686 IIV4 VACC NO PRSV 0.5 ML IM: CPT | Mod: ,,, | Performed by: NURSE PRACTITIONER

## 2021-10-08 PROCEDURE — 90686 FLU VACCINE (QUAD) GREATER THAN OR EQUAL TO 3YO PRESERVATIVE FREE IM: ICD-10-PCS | Mod: ,,, | Performed by: NURSE PRACTITIONER

## 2021-10-08 PROCEDURE — G0008 ADMIN INFLUENZA VIRUS VAC: HCPCS | Mod: ,,, | Performed by: NURSE PRACTITIONER

## 2021-10-08 PROCEDURE — G0008 FLU VACCINE (QUAD) GREATER THAN OR EQUAL TO 3YO PRESERVATIVE FREE IM: ICD-10-PCS | Mod: ,,, | Performed by: NURSE PRACTITIONER

## 2021-11-01 ENCOUNTER — LAB VISIT (OUTPATIENT)
Dept: LAB | Facility: CLINIC | Age: 61
End: 2021-11-01
Payer: MEDICARE

## 2021-11-01 DIAGNOSIS — E03.9 HYPOTHYROIDISM, UNSPECIFIED TYPE: Primary | ICD-10-CM

## 2021-11-01 LAB — TSH SERPL DL<=0.005 MIU/L-ACNC: 2.63 UIU/ML (ref 0.36–3.74)

## 2021-11-01 PROCEDURE — 84443 TSH: ICD-10-PCS | Mod: ,,, | Performed by: CLINICAL MEDICAL LABORATORY

## 2021-11-01 PROCEDURE — 84443 ASSAY THYROID STIM HORMONE: CPT | Mod: ,,, | Performed by: CLINICAL MEDICAL LABORATORY

## 2021-11-02 DIAGNOSIS — E03.9 HYPOTHYROIDISM, UNSPECIFIED TYPE: ICD-10-CM

## 2021-11-02 RX ORDER — LEVOTHYROXINE SODIUM 50 UG/1
50 TABLET ORAL
Qty: 90 TABLET | Refills: 1 | Status: SHIPPED | OUTPATIENT
Start: 2021-11-02 | End: 2022-02-01

## 2022-01-10 PROBLEM — Z09 POSTOP CHECK: Status: RESOLVED | Noted: 2021-10-06 | Resolved: 2022-01-10

## 2022-02-01 ENCOUNTER — OFFICE VISIT (OUTPATIENT)
Dept: CARDIOLOGY | Facility: CLINIC | Age: 62
End: 2022-02-01
Payer: MEDICARE

## 2022-02-01 VITALS
SYSTOLIC BLOOD PRESSURE: 114 MMHG | HEART RATE: 87 BPM | OXYGEN SATURATION: 97 % | DIASTOLIC BLOOD PRESSURE: 68 MMHG | HEIGHT: 64 IN | BODY MASS INDEX: 26.98 KG/M2 | WEIGHT: 158 LBS

## 2022-02-01 DIAGNOSIS — I25.10 CORONARY ARTERY DISEASE INVOLVING NATIVE CORONARY ARTERY OF NATIVE HEART WITHOUT ANGINA PECTORIS: ICD-10-CM

## 2022-02-01 DIAGNOSIS — Z86.79 HISTORY OF RHEUMATIC FEVER: ICD-10-CM

## 2022-02-01 PROCEDURE — 99214 OFFICE O/P EST MOD 30 MIN: CPT | Mod: S$PBB,,, | Performed by: NURSE PRACTITIONER

## 2022-02-01 PROCEDURE — 3074F PR MOST RECENT SYSTOLIC BLOOD PRESSURE < 130 MM HG: ICD-10-PCS | Mod: CPTII,,, | Performed by: NURSE PRACTITIONER

## 2022-02-01 PROCEDURE — 1160F PR REVIEW ALL MEDS BY PRESCRIBER/CLIN PHARMACIST DOCUMENTED: ICD-10-PCS | Mod: CPTII,,, | Performed by: NURSE PRACTITIONER

## 2022-02-01 PROCEDURE — 3008F PR BODY MASS INDEX (BMI) DOCUMENTED: ICD-10-PCS | Mod: CPTII,,, | Performed by: NURSE PRACTITIONER

## 2022-02-01 PROCEDURE — 1159F PR MEDICATION LIST DOCUMENTED IN MEDICAL RECORD: ICD-10-PCS | Mod: CPTII,,, | Performed by: NURSE PRACTITIONER

## 2022-02-01 PROCEDURE — 3074F SYST BP LT 130 MM HG: CPT | Mod: CPTII,,, | Performed by: NURSE PRACTITIONER

## 2022-02-01 PROCEDURE — 3008F BODY MASS INDEX DOCD: CPT | Mod: CPTII,,, | Performed by: NURSE PRACTITIONER

## 2022-02-01 PROCEDURE — 99214 PR OFFICE/OUTPT VISIT, EST, LEVL IV, 30-39 MIN: ICD-10-PCS | Mod: S$PBB,,, | Performed by: NURSE PRACTITIONER

## 2022-02-01 PROCEDURE — 3078F PR MOST RECENT DIASTOLIC BLOOD PRESSURE < 80 MM HG: ICD-10-PCS | Mod: CPTII,,, | Performed by: NURSE PRACTITIONER

## 2022-02-01 PROCEDURE — 1159F MED LIST DOCD IN RCRD: CPT | Mod: CPTII,,, | Performed by: NURSE PRACTITIONER

## 2022-02-01 PROCEDURE — 3078F DIAST BP <80 MM HG: CPT | Mod: CPTII,,, | Performed by: NURSE PRACTITIONER

## 2022-02-01 PROCEDURE — 1160F RVW MEDS BY RX/DR IN RCRD: CPT | Mod: CPTII,,, | Performed by: NURSE PRACTITIONER

## 2022-02-01 PROCEDURE — 99214 OFFICE O/P EST MOD 30 MIN: CPT | Mod: PBBFAC | Performed by: NURSE PRACTITIONER

## 2022-02-01 NOTE — PROGRESS NOTES
[facility] note        DATE OF SERVICE: 02/01/2022       PCP: Imani Caldwell NP      CHIEF COMPLAINT:   Chief Complaint   Patient presents with    Palpitations     With walking        HISTORY OF PRESENT ILLNESS:  Becki Mejia is a 61 y.o. female with a PMH of   Past Medical History:   Diagnosis Date    Depression     Fibromyalgia      who presents for follow up for hypertension. She states she was [facility] the [facility] and her bp was >200 mmHg systolic the first check on a digitial machine and 180 mmHg the second check. She states one of the nurses said they had been having trouble with their bp machine that day. She states she can usually feel it when her bp is up with headaches and she had none that day. She has monitored her bp three times a day since and the hightes reading was 164/71 mmHg once and lowest 99/61 mmHg with the majority in the range of 110s-140s systolic. She also states the her bp is usually up at the Dr's clinic. Her initial bp check here today was 168/84. After our visit we rechecked her bp (using a manual cuff both times) and it was down to 114/68 mmHg.  Chief Complaint   Patient presents with    Palpitations     With walking            PAST MEDICAL HISTORY:  Past Medical History:   Diagnosis Date    Depression     Fibromyalgia        PAST SURGICAL HISTORY:  Past Surgical History:   Procedure Laterality Date    APPENDECTOMY      CARDIAC SURGERY      CORONARY ARTERY BYPASS GRAFT      HYSTERECTOMY      TOTAL    SPINE SURGERY      TONSILLECTOMY         SOCIAL HISTORY:  Social History     Socioeconomic History    Marital status:    Occupational History     Comment: DISABILITY   Tobacco Use    Smoking status: Never Smoker    Smokeless tobacco: Never Used   Substance and Sexual Activity    Alcohol use: Never    Drug use: Never    Sexual activity: Yes       FAMILY HISTORY:  Family History   Problem Relation Age of Onset    Cancer Mother      "Diabetes Mother     Hypertension Mother     Parkinsonism Father     Diabetes Father     Hypertension Father     Colon cancer Sister     Hypoglycemic Sister     Diabetes Paternal Grandmother          ALLERGIES:  Review of patient's allergies indicates:   Allergen Reactions    Codeine Swelling    Tetanus and diphtheria toxoids Itching        MEDICATIONS:    Current Outpatient Medications:     aspirin (ECOTRIN) 81 MG EC tablet, Take 81 mg by mouth once daily., Disp: , Rfl:     atorvastatin (LIPITOR) 80 MG tablet, TAKE 1 TABLET EVERY DAY, Disp: 90 tablet, Rfl: 1    benazepriL (LOTENSIN) 40 MG tablet, TAKE 1 TABLET AT BEDTIME, Disp: 90 tablet, Rfl: 1    buPROPion (WELLBUTRIN XL) 150 MG TB24 tablet, TAKE 1 TABLET EVERY DAY, Disp: 90 tablet, Rfl: 1    carvediloL (COREG) 25 MG tablet, TAKE 1/2 TABLET IN THE MORNING AND TAKE 1 TABLET IN THE EVENING, Disp: 135 tablet, Rfl: 1    ferrous sulfate (FEOSOL) 325 mg (65 mg iron) Tab tablet, Take 3 tablets by mouth once daily., Disp: , Rfl:     hydrOXYzine HCL (ATARAX) 25 MG tablet, TAKE 1 TABLET EVERY EVENING, Disp: 90 tablet, Rfl: 1    levothyroxine (SYNTHROID) 50 MCG tablet, TAKE ONE TABLET BY MOUTH EVERY DAY BEFORE BREAKFAST, Disp: 90 tablet, Rfl: 1    loratadine (CLARITIN) 10 mg tablet, TAKE 1 TABLET EVERY DAY, Disp: 90 tablet, Rfl: 1    potassium chloride (MICRO-K) 10 MEQ CpSR, TAKE 1 CAPSULE EVERY DAY, Disp: 90 capsule, Rfl: 1    spironolactone (ALDACTONE) 25 MG tablet, TAKE 1 TABLET EVERY DAY, Disp: 90 tablet, Rfl: 1    topiramate (TOPAMAX) 50 MG tablet, TAKE 1 TABLET TWICE DAILY, Disp: 180 tablet, Rfl: 1    TRULICITY 0.75 mg/0.5 mL pen injector, Inject 0.75 mg into the skin once a week., Disp: 3 pen, Rfl: 1    vitamin D (VITAMIN D3) 1000 units Tab, Take 1,000 Units by mouth once daily., Disp: , Rfl:   Medications have been reviewed and reconciled.     PHYSICAL EXAM:  /68   Pulse 87   Ht 5' 4" (1.626 m)   Wt 71.7 kg (158 lb)   SpO2 97%   BMI " 27.12 kg/m²   Wt Readings from Last 3 Encounters:   02/01/22 71.7 kg (158 lb)   09/16/21 70 kg (154 lb 4 oz)   09/09/21 70.8 kg (156 lb)      Body mass index is 27.12 kg/m².    Physical Exam  Vitals and nursing note reviewed.   Constitutional:       Appearance: Normal appearance. She is normal weight.   HENT:      Head: Normocephalic and atraumatic.   Eyes:      Pupils: Pupils are equal, round, and reactive to light.   Neck:      Vascular: No carotid bruit.   Cardiovascular:      Rate and Rhythm: Normal rate and regular rhythm.      Pulses: Normal pulses.      Heart sounds: Normal heart sounds.   Pulmonary:      Effort: Pulmonary effort is normal.      Breath sounds: Normal breath sounds.   Abdominal:      General: Bowel sounds are normal.      Palpations: Abdomen is soft.   Musculoskeletal:      Cervical back: Neck supple.      Right lower leg: No edema.      Left lower leg: No edema.   Skin:     General: Skin is warm and dry.      Capillary Refill: Capillary refill takes less than 2 seconds.   Neurological:      General: No focal deficit present.      Mental Status: She is alert and oriented to person, place, and time.   Psychiatric:         Mood and Affect: Mood normal.         Behavior: Behavior normal.         LABS REVIEWED:  Lab Results   Component Value Date    WBC 5.41 09/07/2021    RBC 4.16 (L) 09/07/2021    HGB 12.4 09/07/2021    HCT 38.6 09/07/2021    MCV 92.8 09/07/2021    MCH 29.8 09/07/2021    MCHC 32.1 09/07/2021    RDW 13.0 09/07/2021     09/07/2021    MPV 10.9 09/07/2021    NRBC 0.0 09/07/2021     Lab Results   Component Value Date     09/07/2021    K 4.9 09/07/2021     09/07/2021    CO2 28 09/07/2021    BUN 8 09/07/2021     Lab Results   Component Value Date     09/02/2021    AST 24 09/07/2021    ALT 29 09/07/2021     Lab Results   Component Value Date    GLU 90 09/07/2021     Lab Results   Component Value Date    CHOL 151 07/06/2021    HDL 60 07/06/2021    TRIG 108  07/06/2021    CHOLHDL 2.5 07/06/2021           ASSESSMENT:   Patient Active Problem List   Diagnosis    Fibromyalgia    Essential hypertension    Encounter for screening mammogram for malignant neoplasm of breast    Depression    Heart disease    Anxiety    Osteopenia    Hypothyroidism    Umbilical hernia without obstruction and without gangrene    Ventral hernia without obstruction or gangrene    Enlarged lymph node    Mass of adrenal gland    Contusion of foot including toes, left, initial encounter    Injury of left toe    High risk medication use    Coronary artery disease involving native coronary artery of native heart    History of rheumatic fever            Problem List Items Addressed This Visit        Cardiac/Vascular    Coronary artery disease involving native coronary artery of native heart    Overview     S/p CABG 8/31/2017 for LM disease         History of rheumatic fever           PLAN:  Patient to continue to monitor and document home bp and heart rate. She may take her Norvasc she has on has prn sbp greater than 180 mmHg as she has previously done.    RTC: 6 months

## 2022-04-19 ENCOUNTER — OFFICE VISIT (OUTPATIENT)
Dept: FAMILY MEDICINE | Facility: CLINIC | Age: 62
End: 2022-04-19
Payer: MEDICARE

## 2022-04-19 ENCOUNTER — APPOINTMENT (OUTPATIENT)
Dept: RADIOLOGY | Facility: CLINIC | Age: 62
End: 2022-04-19
Attending: NURSE PRACTITIONER
Payer: MEDICARE

## 2022-04-19 VITALS
OXYGEN SATURATION: 98 % | TEMPERATURE: 98 F | SYSTOLIC BLOOD PRESSURE: 138 MMHG | HEIGHT: 64 IN | RESPIRATION RATE: 18 BRPM | WEIGHT: 157.13 LBS | DIASTOLIC BLOOD PRESSURE: 84 MMHG | HEART RATE: 77 BPM | BODY MASS INDEX: 26.82 KG/M2

## 2022-04-19 DIAGNOSIS — E03.9 HYPOTHYROIDISM, UNSPECIFIED TYPE: ICD-10-CM

## 2022-04-19 DIAGNOSIS — M79.7 FIBROMYALGIA: ICD-10-CM

## 2022-04-19 DIAGNOSIS — I10 ESSENTIAL HYPERTENSION: ICD-10-CM

## 2022-04-19 DIAGNOSIS — Z79.899 HIGH RISK MEDICATION USE: ICD-10-CM
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
## 2022-04-19 DIAGNOSIS — F32.A DEPRESSION, UNSPECIFIED DEPRESSION TYPE: ICD-10-CM

## 2022-04-19 DIAGNOSIS — M85.80 OSTEOPENIA, UNSPECIFIED LOCATION: ICD-10-CM

## 2022-04-19 DIAGNOSIS — F41.9 ANXIETY: ICD-10-CM

## 2022-04-19 DIAGNOSIS — E78.2 MIXED HYPERLIPIDEMIA: ICD-10-CM
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
## 2022-04-19 PROCEDURE — 99214 OFFICE O/P EST MOD 30 MIN: CPT | Mod: ,,, | Performed by: NURSE PRACTITIONER

## 2022-04-19 PROCEDURE — 1160F PR REVIEW ALL MEDS BY PRESCRIBER/CLIN PHARMACIST DOCUMENTED: ICD-10-PCS | Mod: ,,, | Performed by: NURSE PRACTITIONER

## 2022-04-19 PROCEDURE — 73560 XR KNEE 1 OR 2 VIEW LEFT: ICD-10-PCS | Mod: 26,LT,, | Performed by: RADIOLOGY

## 2022-04-19 PROCEDURE — 1159F MED LIST DOCD IN RCRD: CPT | Mod: ,,, | Performed by: NURSE PRACTITIONER

## 2022-04-19 PROCEDURE — 4010F PR ACE/ARB THEARPY RXD/TAKEN: ICD-10-PCS | Mod: ,,, | Performed by: NURSE PRACTITIONER

## 2022-04-19 PROCEDURE — 1160F RVW MEDS BY RX/DR IN RCRD: CPT | Mod: ,,, | Performed by: NURSE PRACTITIONER

## 2022-04-19 PROCEDURE — 3075F PR MOST RECENT SYSTOLIC BLOOD PRESS GE 130-139MM HG: ICD-10-PCS | Mod: ,,, | Performed by: NURSE PRACTITIONER

## 2022-04-19 PROCEDURE — 3008F BODY MASS INDEX DOCD: CPT | Mod: ,,, | Performed by: NURSE PRACTITIONER

## 2022-04-19 PROCEDURE — 1159F PR MEDICATION LIST DOCUMENTED IN MEDICAL RECORD: ICD-10-PCS | Mod: ,,, | Performed by: NURSE PRACTITIONER

## 2022-04-19 PROCEDURE — 73560 X-RAY EXAM OF KNEE 1 OR 2: CPT | Mod: 26,LT,, | Performed by: RADIOLOGY

## 2022-04-19 PROCEDURE — 4010F ACE/ARB THERAPY RXD/TAKEN: CPT | Mod: ,,, | Performed by: NURSE PRACTITIONER

## 2022-04-19 PROCEDURE — 99214 PR OFFICE/OUTPT VISIT, EST, LEVL IV, 30-39 MIN: ICD-10-PCS | Mod: ,,, | Performed by: NURSE PRACTITIONER

## 2022-04-19 PROCEDURE — 3008F PR BODY MASS INDEX (BMI) DOCUMENTED: ICD-10-PCS | Mod: ,,, | Performed by: NURSE PRACTITIONER

## 2022-04-19 PROCEDURE — 3079F DIAST BP 80-89 MM HG: CPT | Mod: ,,, | Performed by: NURSE PRACTITIONER

## 2022-04-19 PROCEDURE — 3079F PR MOST RECENT DIASTOLIC BLOOD PRESSURE 80-89 MM HG: ICD-10-PCS | Mod: ,,, | Performed by: NURSE PRACTITIONER

## 2022-04-19 PROCEDURE — 73560 X-RAY EXAM OF KNEE 1 OR 2: CPT | Mod: TC,RHCUB,LT | Performed by: NURSE PRACTITIONER

## 2022-04-19 PROCEDURE — 3075F SYST BP GE 130 - 139MM HG: CPT | Mod: ,,, | Performed by: NURSE PRACTITIONER

## 2022-04-19 RX ORDER — HYDROXYZINE HYDROCHLORIDE 25 MG/1
25 TABLET, FILM COATED ORAL NIGHTLY
Qty: 90 TABLET | Refills: 1 | Status: SHIPPED | OUTPATIENT
Start: 2022-04-19 | End: 2022-09-14 | Stop reason: SDUPTHER

## 2022-04-19 RX ORDER — CARVEDILOL 25 MG/1
25 TABLET ORAL 2 TIMES DAILY
Qty: 180 TABLET | Refills: 1 | Status: SHIPPED | OUTPATIENT
Start: 2022-04-19 | End: 2022-09-13

## 2022-04-19 RX ORDER — SEMAGLUTIDE 1.34 MG/ML
2 INJECTION, SOLUTION SUBCUTANEOUS WEEKLY
Qty: 4 PEN | Refills: 6 | Status: SHIPPED | OUTPATIENT
Start: 2022-04-19 | End: 2022-04-20

## 2022-04-19 RX ORDER — FERROUS SULFATE 325(65) MG
325 TABLET ORAL DAILY
Qty: 90 TABLET | Refills: 1 | Status: SHIPPED | OUTPATIENT
Start: 2022-04-19 | End: 2022-09-14 | Stop reason: SDUPTHER

## 2022-04-19 RX ORDER — POTASSIUM CHLORIDE 750 MG/1
10 CAPSULE, EXTENDED RELEASE ORAL DAILY
Qty: 90 CAPSULE | Refills: 1 | Status: SHIPPED | OUTPATIENT
Start: 2022-04-19 | End: 2022-09-13 | Stop reason: SDUPTHER

## 2022-04-19 RX ORDER — BUPROPION HYDROCHLORIDE 150 MG/1
150 TABLET ORAL DAILY
Qty: 90 TABLET | Refills: 1 | Status: SHIPPED | OUTPATIENT
Start: 2022-04-19 | End: 2022-09-13 | Stop reason: SDUPTHER

## 2022-04-19 RX ORDER — SPIRONOLACTONE 25 MG/1
25 TABLET ORAL DAILY
Qty: 90 TABLET | Refills: 1 | Status: SHIPPED | OUTPATIENT
Start: 2022-04-19 | End: 2022-09-26 | Stop reason: SDUPTHER

## 2022-04-19 RX ORDER — ATORVASTATIN CALCIUM 80 MG/1
80 TABLET, FILM COATED ORAL DAILY
Qty: 90 TABLET | Refills: 1 | Status: SHIPPED | OUTPATIENT
Start: 2022-04-19 | End: 2022-05-02

## 2022-04-19 RX ORDER — TOPIRAMATE 50 MG/1
50 TABLET, FILM COATED ORAL 2 TIMES DAILY
Qty: 180 TABLET | Refills: 1 | Status: SHIPPED | OUTPATIENT
Start: 2022-04-19 | End: 2022-09-13 | Stop reason: SDUPTHER

## 2022-04-19 RX ORDER — BENAZEPRIL HYDROCHLORIDE 40 MG/1
40 TABLET ORAL NIGHTLY
Qty: 90 TABLET | Refills: 1 | Status: SHIPPED | OUTPATIENT
Start: 2022-04-19 | End: 2022-09-13 | Stop reason: SDUPTHER

## 2022-04-19 RX ORDER — SEMAGLUTIDE 1.34 MG/ML
1 INJECTION, SOLUTION SUBCUTANEOUS WEEKLY
COMMUNITY
Start: 2022-02-08 | End: 2022-04-19 | Stop reason: SDUPTHER

## 2022-04-19 RX ORDER — CHOLECALCIFEROL (VITAMIN D3) 25 MCG
1000 TABLET ORAL DAILY
Qty: 90 TABLET | Refills: 1 | Status: SHIPPED | OUTPATIENT
Start: 2022-04-19 | End: 2022-09-26 | Stop reason: SDUPTHER

## 2022-04-19 NOTE — PATIENT INSTRUCTIONS
Pt can take statin every other day or stop taking and see if muscle pain is better and ck improves. May repeat ck in 1 month if she stops taking statin

## 2022-04-19 NOTE — PROGRESS NOTES
Imani Caldwell NP   [facility]  [address]  [place]     PATIENT NAME: Becki Mejia  : 1960  DATE: 22  MRN: [id number]      Billing Provider: Imani Caldwell NP  Level of Service:   Patient PCP Information     Provider PCP Type    Imani Caldwell NP General          Reason for Visit / Chief Complaint: Medication Refill and Fall (Pt states that she was going to get her package from the front porch and fell while going out of the door and hit her left knee. )       Update PCP  Update Chief Complaint         History of Present Illness / Problem Focused Workflow     Becki Mejia presents to the clinic   Here for eval of htn, hypothyroidism and depression, also stated that she fell and hurt left knee    Review of Systems     Review of Systems   Constitutional: Negative for chills, fatigue and fever.   HENT: Negative for nasal congestion, ear pain, facial swelling, hearing loss, mouth dryness, mouth sores, postnasal drip, rhinorrhea, sinus pressure/congestion and goiter.    Eyes: Negative for discharge and itching.   Respiratory: Negative for cough, shortness of breath and wheezing.    Cardiovascular: Negative for chest pain and leg swelling.   Gastrointestinal: Negative for abdominal pain, change in bowel habit and change in bowel habit.   Genitourinary: Negative for difficulty urinating, dysuria, enuresis, frequency, hematuria and urgency.   Musculoskeletal:        Mild pain of left knee   Neurological: Negative for dizziness, vertigo, syncope, weakness and headaches.   Psychiatric/Behavioral: Negative for decreased concentration.        Medical / Social / Family History     Past Medical History:   Diagnosis Date    Depression     Fibromyalgia        Past Surgical History:   Procedure Laterality Date    APPENDECTOMY      CARDIAC SURGERY      CORONARY ARTERY BYPASS GRAFT      HYSTERECTOMY      TOTAL    SPINE SURGERY      TONSILLECTOMY         Social History  Ms.  reports  that she has never smoked. She has never used smokeless tobacco. She reports that she does not drink alcohol and does not use drugs.    Family History  Ms.'s family history includes Cancer in her mother; Colon cancer in her sister; Diabetes in her father, mother, and paternal grandmother; Hypertension in her father and mother; Hypoglycemic in her sister; Parkinsonism in her father.    Medications and Allergies     Medications  Outpatient Medications Marked as Taking for the 4/19/22 encounter (Office Visit) with Imani Caldwell NP   Medication Sig Dispense Refill    aspirin (ECOTRIN) 81 MG EC tablet Take 81 mg by mouth once daily.      levothyroxine (SYNTHROID) 50 MCG tablet TAKE ONE TABLET BY MOUTH EVERY DAY BEFORE BREAKFAST 90 tablet 1    loratadine (CLARITIN) 10 mg tablet TAKE 1 TABLET EVERY DAY 90 tablet 1    [DISCONTINUED] atorvastatin (LIPITOR) 80 MG tablet TAKE 1 TABLET EVERY DAY 90 tablet 1    [DISCONTINUED] benazepriL (LOTENSIN) 40 MG tablet TAKE 1 TABLET AT BEDTIME 90 tablet 1    [DISCONTINUED] buPROPion (WELLBUTRIN XL) 150 MG TB24 tablet TAKE 1 TABLET EVERY DAY 90 tablet 1    [DISCONTINUED] carvediloL (COREG) 25 MG tablet TAKE 1/2 TABLET IN THE MORNING AND TAKE 1 TABLET IN THE EVENING (Patient taking differently: Take 25 mg by mouth 2 (two) times daily.) 135 tablet 1    [DISCONTINUED] ferrous sulfate (FEOSOL) 325 mg (65 mg iron) Tab tablet Take 3 tablets by mouth once daily.      [DISCONTINUED] hydrOXYzine HCL (ATARAX) 25 MG tablet TAKE 1 TABLET EVERY EVENING 90 tablet 1    [DISCONTINUED] potassium chloride (MICRO-K) 10 MEQ CpSR TAKE 1 CAPSULE EVERY DAY 90 capsule 1    [DISCONTINUED] spironolactone (ALDACTONE) 25 MG tablet TAKE 1 TABLET EVERY DAY 90 tablet 1    [DISCONTINUED] topiramate (TOPAMAX) 50 MG tablet TAKE 1 TABLET TWICE DAILY 180 tablet 1    [DISCONTINUED] vitamin D (VITAMIN D3) 1000 units Tab Take 1,000 Units by mouth once daily.         Allergies  Review of patient's allergies  "indicates:   Allergen Reactions    Codeine Swelling    Codeine phosphate Other (See Comments)    Tetanus and diphtheria toxoids Itching    Tetanus immune globulin Other (See Comments)       Physical Examination     Vitals:    04/19/22 0856   BP: 138/84   BP Location: Left arm   Patient Position: Sitting   BP Method: Medium (Manual)   Pulse: 77   Resp: 18   Temp: 97.8 °F (36.6 °C)   TempSrc: Oral   SpO2: 98%   Weight: 71.3 kg (157 lb 2 oz)   Height: 5' 4" (1.626 m)      Physical Exam  Constitutional:       Appearance: Normal appearance.   HENT:      Head: Normocephalic.      Right Ear: Tympanic membrane, ear canal and external ear normal.      Left Ear: Tympanic membrane, ear canal and external ear normal.      Nose: Nose normal.      Mouth/Throat:      Mouth: Mucous membranes are moist.      Pharynx: Oropharynx is clear.   Eyes:      Extraocular Movements: Extraocular movements intact.      Conjunctiva/sclera: Conjunctivae normal.      Pupils: Pupils are equal, round, and reactive to light.   Cardiovascular:      Rate and Rhythm: Normal rate and regular rhythm.      Pulses: Normal pulses.      Heart sounds: Normal heart sounds.   Pulmonary:      Effort: Pulmonary effort is normal.      Breath sounds: Normal breath sounds.   Abdominal:      General: Bowel sounds are normal.      Palpations: Abdomen is soft.   Musculoskeletal:         General: Tenderness (mild tenderness of left knee, no fx noted of knee, ) present. Normal range of motion.      Cervical back: Normal range of motion and neck supple.   Skin:     General: Skin is warm and dry.      Capillary Refill: Capillary refill takes less than 2 seconds.   Neurological:      General: No focal deficit present.      Mental Status: She is alert and oriented to person, place, and time.   Psychiatric:         Mood and Affect: Mood normal.         Behavior: Behavior normal.          Assessment and Plan (including Health Maintenance)      Problem List  Smart Sets  " Document Outside HM   :    Plan: take meds as ordered, stressed to be careful while ambulating due to hx of osteopenia with fx. Return to clinic as scheduled,     Fall, initial encounter  -     X-Ray Knee 1 or 2 View Left; Future; Expected date: 04/19/2022    Other orders  -     atorvastatin (LIPITOR) 80 MG tablet; Take 1 tablet (80 mg total) by mouth once daily.  Dispense: 90 tablet; Refill: 1  -     benazepriL (LOTENSIN) 40 MG tablet; Take 1 tablet (40 mg total) by mouth nightly.  Dispense: 90 tablet; Refill: 1  -     buPROPion (WELLBUTRIN XL) 150 MG TB24 tablet; Take 1 tablet (150 mg total) by mouth once daily.  Dispense: 90 tablet; Refill: 1  -     carvediloL (COREG) 25 MG tablet; Take 1 tablet (25 mg total) by mouth 2 (two) times daily.  Dispense: 180 tablet; Refill: 1  -     ferrous sulfate (FEOSOL) 325 mg (65 mg iron) Tab tablet; Take 1 tablet (325 mg total) by mouth once daily.  Dispense: 90 tablet; Refill: 1  -     hydrOXYzine HCL (ATARAX) 25 MG tablet; Take 1 tablet (25 mg total) by mouth every evening.  Dispense: 90 tablet; Refill: 1  -     OZEMPIC 0.25 mg or 0.5 mg(2 mg/1.5 mL) pen injector; Inject 2 mg into the skin once a week.  Dispense: 4 pen; Refill: 6  -     potassium chloride (MICRO-K) 10 MEQ CpSR; Take 1 capsule (10 mEq total) by mouth once daily.  Dispense: 90 capsule; Refill: 1  -     spironolactone (ALDACTONE) 25 MG tablet; Take 1 tablet (25 mg total) by mouth once daily.  Dispense: 90 tablet; Refill: 1  -     topiramate (TOPAMAX) 50 MG tablet; Take 1 tablet (50 mg total) by mouth 2 (two) times daily.  Dispense: 180 tablet; Refill: 1  -     vitamin D (VITAMIN D3) 1000 units Tab; Take 1 tablet (1,000 Units total) by mouth once daily.  Dispense: 90 tablet; Refill: 1            Health Maintenance Due   Topic Date Due    Hepatitis C Screening  Never done    Hemoglobin A1c  Never done    Diabetes Urine Screening  Never done    Colorectal Cancer Screening  Never done       Problem List Items  Addressed This Visit    None     Visit Diagnoses     Fall, initial encounter    -  Primary    Relevant Orders    X-Ray Knee 1 or 2 View Left (Completed)            Health Maintenance Topics with due status: Not Due       Topic Last Completion Date    Mammogram 09/09/2021    Lipid Panel 04/18/2022       Procedures     Future Appointments   Date Time Provider Department Center   6/13/2022  8:30 AM JOSEPH NURSE, [facility]   7/19/2022 10:00 AM Imani Caldwell NP [facility]   8/9/2022  9:00 AM ADRIANNA Figueroa [facility]        Follow up in about 3 months (around 7/19/2022).       Signature:  Imani Caldwell NP    Date of encounter: 4/19/22

## 2022-04-20 PROBLEM — S99.922A INJURY OF LEFT TOE: Status: RESOLVED | Noted: 2021-09-16 | Resolved: 2022-04-20

## 2022-04-20 PROBLEM — S90.122A CONTUSION OF FOOT INCLUDING TOES, LEFT, INITIAL ENCOUNTER: Status: RESOLVED | Noted: 2021-09-16 | Resolved: 2022-04-20

## 2022-04-20 PROBLEM — R59.9 ENLARGED LYMPH NODE: Status: RESOLVED | Noted: 2021-08-30 | Resolved: 2022-04-20

## 2022-04-20 PROBLEM — S90.32XA CONTUSION OF FOOT INCLUDING TOES, LEFT, INITIAL ENCOUNTER: Status: RESOLVED | Noted: 2021-09-16 | Resolved: 2022-04-20

## 2022-04-27 ENCOUNTER — OFFICE VISIT (OUTPATIENT)
Dept: FAMILY MEDICINE | Facility: CLINIC | Age: 62
End: 2022-04-27
Payer: MEDICARE

## 2022-04-27 ENCOUNTER — APPOINTMENT (OUTPATIENT)
Dept: RADIOLOGY | Facility: CLINIC | Age: 62
End: 2022-04-27
Attending: NURSE PRACTITIONER
Payer: MEDICARE

## 2022-04-27 VITALS
SYSTOLIC BLOOD PRESSURE: 168 MMHG | BODY MASS INDEX: 26.66 KG/M2 | RESPIRATION RATE: 20 BRPM | DIASTOLIC BLOOD PRESSURE: 98 MMHG | HEART RATE: 75 BPM | TEMPERATURE: 98 F | HEIGHT: 64 IN | WEIGHT: 156.13 LBS | OXYGEN SATURATION: 99 %

## 2022-04-27 DIAGNOSIS — M54.50 LUMBAR PAIN: Primary | ICD-10-CM

## 2022-04-27 DIAGNOSIS — R07.89 CHEST DISCOMFORT: ICD-10-CM

## 2022-04-27 DIAGNOSIS — M54.9 BACK PAIN, UNSPECIFIED BACK LOCATION, UNSPECIFIED BACK PAIN LATERALITY, UNSPECIFIED CHRONICITY: ICD-10-CM

## 2022-04-27 PROCEDURE — 4010F ACE/ARB THERAPY RXD/TAKEN: CPT | Mod: ,,, | Performed by: NURSE PRACTITIONER

## 2022-04-27 PROCEDURE — 99214 PR OFFICE/OUTPT VISIT, EST, LEVL IV, 30-39 MIN: ICD-10-PCS | Mod: 25,,, | Performed by: NURSE PRACTITIONER

## 2022-04-27 PROCEDURE — 3008F BODY MASS INDEX DOCD: CPT | Mod: ,,, | Performed by: NURSE PRACTITIONER

## 2022-04-27 PROCEDURE — 96372 THER/PROPH/DIAG INJ SC/IM: CPT | Mod: ,,, | Performed by: NURSE PRACTITIONER

## 2022-04-27 PROCEDURE — 99214 OFFICE O/P EST MOD 30 MIN: CPT | Mod: 25,,, | Performed by: NURSE PRACTITIONER

## 2022-04-27 PROCEDURE — 1159F PR MEDICATION LIST DOCUMENTED IN MEDICAL RECORD: ICD-10-PCS | Mod: ,,, | Performed by: NURSE PRACTITIONER

## 2022-04-27 PROCEDURE — 4010F PR ACE/ARB THEARPY RXD/TAKEN: ICD-10-PCS | Mod: ,,, | Performed by: NURSE PRACTITIONER

## 2022-04-27 PROCEDURE — 3077F SYST BP >= 140 MM HG: CPT | Mod: ,,, | Performed by: NURSE PRACTITIONER

## 2022-04-27 PROCEDURE — 93000 ELECTROCARDIOGRAM COMPLETE: CPT | Mod: ,,, | Performed by: NURSE PRACTITIONER

## 2022-04-27 PROCEDURE — 3080F PR MOST RECENT DIASTOLIC BLOOD PRESSURE >= 90 MM HG: ICD-10-PCS | Mod: ,,, | Performed by: NURSE PRACTITIONER

## 2022-04-27 PROCEDURE — 72100 XR LUMBAR SPINE AP AND LATERAL: ICD-10-PCS | Mod: 26,,, | Performed by: RADIOLOGY

## 2022-04-27 PROCEDURE — 72100 X-RAY EXAM L-S SPINE 2/3 VWS: CPT | Mod: TC,RHCUB | Performed by: NURSE PRACTITIONER

## 2022-04-27 PROCEDURE — 3077F PR MOST RECENT SYSTOLIC BLOOD PRESSURE >= 140 MM HG: ICD-10-PCS | Mod: ,,, | Performed by: NURSE PRACTITIONER

## 2022-04-27 PROCEDURE — 3008F PR BODY MASS INDEX (BMI) DOCUMENTED: ICD-10-PCS | Mod: ,,, | Performed by: NURSE PRACTITIONER

## 2022-04-27 PROCEDURE — 96372 PR INJECTION,THERAP/PROPH/DIAG2ST, IM OR SUBCUT: ICD-10-PCS | Mod: ,,, | Performed by: NURSE PRACTITIONER

## 2022-04-27 PROCEDURE — 93000 PR ELECTROCARDIOGRAM, COMPLETE: ICD-10-PCS | Mod: ,,, | Performed by: NURSE PRACTITIONER

## 2022-04-27 PROCEDURE — 1159F MED LIST DOCD IN RCRD: CPT | Mod: ,,, | Performed by: NURSE PRACTITIONER

## 2022-04-27 PROCEDURE — 72100 X-RAY EXAM L-S SPINE 2/3 VWS: CPT | Mod: 26,,, | Performed by: RADIOLOGY

## 2022-04-27 PROCEDURE — 3080F DIAST BP >= 90 MM HG: CPT | Mod: ,,, | Performed by: NURSE PRACTITIONER

## 2022-04-27 RX ORDER — PREDNISONE 10 MG/1
10 TABLET ORAL DAILY
Qty: 10 TABLET | Refills: 0 | Status: SHIPPED | OUTPATIENT
Start: 2022-04-27 | End: 2022-05-25

## 2022-04-27 RX ORDER — KETOROLAC TROMETHAMINE 30 MG/ML
60 INJECTION, SOLUTION INTRAMUSCULAR; INTRAVENOUS
Status: COMPLETED | OUTPATIENT
Start: 2022-04-27 | End: 2022-04-27

## 2022-04-27 RX ORDER — DICLOFENAC SODIUM 75 MG/1
75 TABLET, DELAYED RELEASE ORAL 2 TIMES DAILY
Qty: 20 TABLET | Refills: 0 | Status: SHIPPED | OUTPATIENT
Start: 2022-04-27 | End: 2022-06-13

## 2022-04-27 RX ORDER — METHYLPREDNISOLONE ACETATE 40 MG/ML
40 INJECTION, SUSPENSION INTRA-ARTICULAR; INTRALESIONAL; INTRAMUSCULAR; SOFT TISSUE
Status: COMPLETED | OUTPATIENT
Start: 2022-04-27 | End: 2022-04-27

## 2022-04-27 RX ORDER — DEXAMETHASONE SODIUM PHOSPHATE 4 MG/ML
4 INJECTION, SOLUTION INTRA-ARTICULAR; INTRALESIONAL; INTRAMUSCULAR; INTRAVENOUS; SOFT TISSUE
Status: COMPLETED | OUTPATIENT
Start: 2022-04-27 | End: 2022-04-27

## 2022-04-27 RX ADMIN — METHYLPREDNISOLONE ACETATE 40 MG: 40 INJECTION, SUSPENSION INTRA-ARTICULAR; INTRALESIONAL; INTRAMUSCULAR; SOFT TISSUE at 04:04

## 2022-04-27 RX ADMIN — KETOROLAC TROMETHAMINE 60 MG: 30 INJECTION, SOLUTION INTRAMUSCULAR; INTRAVENOUS at 04:04

## 2022-04-27 RX ADMIN — DEXAMETHASONE SODIUM PHOSPHATE 4 MG: 4 INJECTION, SOLUTION INTRA-ARTICULAR; INTRALESIONAL; INTRAMUSCULAR; INTRAVENOUS; SOFT TISSUE at 04:04

## 2022-04-27 NOTE — PROGRESS NOTES
Imani Caldwell NP   [facility]  [address] [address]  [place]     PATIENT NAME: Becki Mejia  : 1960  DATE: 22  MRN: [id number]      Billing Provider: Imani Caldwell NP  Level of Service:   Patient PCP Information     Provider PCP Type    Imani Caldwell NP General          Reason for Visit / Chief Complaint: Leg Pain (C/o pain  and swelling of left leg that radiates into left hip x several days), Chest Pain (C/o chest discomfort at times, some sob), and Hypertension ( blood pressure has been elevated, c/o headache)       Update PCP  Update Chief Complaint         History of Present Illness / Problem Focused Workflow     Becki Mejia presents to the clinic c/o leg pain and swelling of left leg that radiates into left hip x several days, chest pain, discomfort at time , some sob and htn, bp has been elevated, also c/o headache      Review of Systems     Review of Systems   Constitutional: Negative for chills, fatigue and fever.   HENT: Negative for nasal congestion, ear pain, facial swelling, hearing loss, mouth dryness, mouth sores, postnasal drip, rhinorrhea, sinus pressure/congestion and goiter.    Eyes: Negative for discharge and itching.   Respiratory: Positive for shortness of breath. Negative for cough, chest tightness and wheezing.    Cardiovascular: Positive for chest pain. Negative for leg swelling.   Gastrointestinal: Negative for abdominal pain, change in bowel habit and change in bowel habit.   Genitourinary: Negative for difficulty urinating, dysuria, enuresis, frequency, hematuria and urgency.   Musculoskeletal: Positive for back pain and leg pain.   Neurological: Negative for dizziness, vertigo, syncope, weakness and headaches.   Psychiatric/Behavioral: Negative for decreased concentration.        Medical / Social / Family History     Past Medical History:   Diagnosis Date    Depression     Fibromyalgia        Past Surgical History:   Procedure Laterality Date     APPENDECTOMY      CARDIAC SURGERY      CORONARY ARTERY BYPASS GRAFT      HYSTERECTOMY      TOTAL    SPINE SURGERY      TONSILLECTOMY         Social History  Ms.  reports that she has never smoked. She has never used smokeless tobacco. She reports that she does not drink alcohol and does not use drugs.    Family History  Ms.'s family history includes Cancer in her mother; Colon cancer in her sister; Diabetes in her father, mother, and paternal grandmother; Hypertension in her father and mother; Hypoglycemic in her sister; Parkinsonism in her father.    Medications and Allergies     Medications  Outpatient Medications Marked as Taking for the 4/27/22 encounter (Office Visit) with Imani Caldwell NP   Medication Sig Dispense Refill    aspirin (ECOTRIN) 81 MG EC tablet Take 81 mg by mouth once daily.      atorvastatin (LIPITOR) 80 MG tablet Take 1 tablet (80 mg total) by mouth once daily. 90 tablet 1    benazepriL (LOTENSIN) 40 MG tablet Take 1 tablet (40 mg total) by mouth nightly. 90 tablet 1    buPROPion (WELLBUTRIN XL) 150 MG TB24 tablet Take 1 tablet (150 mg total) by mouth once daily. 90 tablet 1    carvediloL (COREG) 25 MG tablet Take 1 tablet (25 mg total) by mouth 2 (two) times daily. 180 tablet 1    ferrous sulfate (FEOSOL) 325 mg (65 mg iron) Tab tablet Take 1 tablet (325 mg total) by mouth once daily. 90 tablet 1    hydrOXYzine HCL (ATARAX) 25 MG tablet Take 1 tablet (25 mg total) by mouth every evening. 90 tablet 1    levothyroxine (SYNTHROID) 50 MCG tablet TAKE ONE TABLET BY MOUTH EVERY DAY BEFORE BREAKFAST 90 tablet 1    loratadine (CLARITIN) 10 mg tablet TAKE 1 TABLET EVERY DAY 90 tablet 1    potassium chloride (MICRO-K) 10 MEQ CpSR Take 1 capsule (10 mEq total) by mouth once daily. 90 capsule 1    spironolactone (ALDACTONE) 25 MG tablet Take 1 tablet (25 mg total) by mouth once daily. 90 tablet 1    topiramate (TOPAMAX) 50 MG tablet Take 1 tablet (50 mg total) by mouth 2 (two) times  "daily. 180 tablet 1    vitamin D (VITAMIN D3) 1000 units Tab Take 1 tablet (1,000 Units total) by mouth once daily. 90 tablet 1     Current Facility-Administered Medications for the 4/27/22 encounter (Office Visit) with Imani Caldwell NP   Medication Dose Route Frequency Provider Last Rate Last Admin    [COMPLETED] dexamethasone injection 4 mg  4 mg Intramuscular 1 time in Clinic/HOD Imani Caldwell NP   4 mg at 04/27/22 1609    [COMPLETED] ketorolac injection 60 mg  60 mg Intramuscular 1 time in Clinic/HOD Imani Caldwell NP   60 mg at 04/27/22 1610    [COMPLETED] methylPREDNISolone acetate injection 40 mg  40 mg Intramuscular 1 time in Clinic/HOD Imani Caldwell NP   40 mg at 04/27/22 1609       Allergies  Review of patient's allergies indicates:   Allergen Reactions    Codeine Swelling    Codeine phosphate Other (See Comments)    Tetanus and diphtheria toxoids Itching    Tetanus immune globulin Other (See Comments)       Physical Examination     Vitals:    04/27/22 1507   BP: (!) 168/98   BP Location: Left arm   Patient Position: Sitting   BP Method: Medium (Manual)   Pulse: 75   Resp: 20   Temp: 98 °F (36.7 °C)   TempSrc: Oral   SpO2: 99%   Weight: 70.8 kg (156 lb 2 oz)   Height: 5' 4.02" (1.626 m)      Physical Exam  Vitals and nursing note reviewed.   Constitutional:       Appearance: Normal appearance.      Comments: Pt does not appear to be in any acute distress   HENT:      Head: Normocephalic.      Right Ear: Tympanic membrane, ear canal and external ear normal.      Left Ear: Tympanic membrane, ear canal and external ear normal.      Nose: Nose normal.      Mouth/Throat:      Mouth: Mucous membranes are moist.      Pharynx: Oropharynx is clear.   Eyes:      Extraocular Movements: Extraocular movements intact.      Conjunctiva/sclera: Conjunctivae normal.      Pupils: Pupils are equal, round, and reactive to light.   Cardiovascular:      Rate and Rhythm: Normal rate and regular rhythm.      " Pulses: Normal pulses.      Heart sounds: Normal heart sounds.      Comments: ekg without signs of ischemia  Pulmonary:      Effort: Pulmonary effort is normal.      Breath sounds: Normal breath sounds.   Abdominal:      General: Bowel sounds are normal.      Palpations: Abdomen is soft.   Musculoskeletal:         General: Tenderness (pain in lower back and legs, rates pain carmelita 7-8, full rom to ext. leg lifts positive at 30 degrees) present. Normal range of motion.      Cervical back: Normal range of motion and neck supple.   Skin:     General: Skin is warm and dry.      Capillary Refill: Capillary refill takes less than 2 seconds.   Neurological:      General: No focal deficit present.      Mental Status: She is alert and oriented to person, place, and time.   Psychiatric:         Mood and Affect: Mood normal.         Behavior: Behavior normal.          Assessment and Plan (including Health Maintenance)      Problem List  Smart [place]  Document Outside HM   :    Plan:     Back pain, unspecified back location, unspecified back pain laterality, unspecified chronicity  -     X-Ray Lumbar Spine AP And Lateral; Future; Expected date: 04/27/2022    Chest discomfort  -     EKG 12-lead; Future    Lumbar pain  -     MRI Lumbar Spine With Contrast; Future; Expected date: 04/27/2022  -     Creatinine, serum; Future; Expected date: 04/27/2022  -     dexamethasone injection 4 mg  -     methylPREDNISolone acetate injection 40 mg  -     ketorolac injection 60 mg    Other orders  -     predniSONE (DELTASONE) 10 MG tablet; Take 1 tablet (10 mg total) by mouth once daily.  Dispense: 10 tablet; Refill: 0  -     diclofenac (VOLTAREN) 75 MG EC tablet; Take 1 tablet (75 mg total) by mouth 2 (two) times daily.  Dispense: 20 tablet; Refill: 0            Health Maintenance Due   Topic Date Due    Hepatitis C Screening  Never done    Colorectal Cancer Screening  Never done       Problem List Items Addressed This Visit    None     Visit  Diagnoses     Back pain, unspecified back location, unspecified back pain laterality, unspecified chronicity    -  Primary    Relevant Orders    X-Ray Lumbar Spine AP And Lateral    Chest discomfort        Relevant Orders    EKG 12-lead    Lumbar pain        Relevant Medications    dexamethasone injection 4 mg (Completed)    methylPREDNISolone acetate injection 40 mg (Completed)    ketorolac injection 60 mg (Completed)    Other Relevant Orders    MRI Lumbar Spine With Contrast    Creatinine, serum            Health Maintenance Topics with due status: Not Due       Topic Last Completion Date    Mammogram 09/09/2021    Lipid Panel 04/18/2022       Procedures     Future Appointments   Date Time Provider Department Center   5/17/2022  9:15 AM Imani Caldwell NP [facility]   6/13/2022  8:30 AM JOSEPH NURSE, [facility] FAMILY MEDICINE [facility]   7/19/2022 10:00 AM Imani Caldwell NP [facility]   8/9/2022  9:00 AM ADRIANNA Figueroa [facility]        Follow up in about 2 weeks (around 5/11/2022).       Signature:  Imnai Caldwell NP    Date of encounter: 4/27/22

## 2022-04-27 NOTE — PATIENT INSTRUCTIONS
Take britney as cardiologist has instructed her for elevated bp, will schedule mri of ls spine with contrast, go to the er if sob or any chest pain occurs. Return to clinic as needed and as scheduled

## 2022-05-02 ENCOUNTER — OFFICE VISIT (OUTPATIENT)
Dept: FAMILY MEDICINE | Facility: CLINIC | Age: 62
End: 2022-05-02
Payer: MEDICARE

## 2022-05-02 VITALS
SYSTOLIC BLOOD PRESSURE: 150 MMHG | OXYGEN SATURATION: 97 % | WEIGHT: 156 LBS | RESPIRATION RATE: 18 BRPM | HEART RATE: 79 BPM | BODY MASS INDEX: 26.63 KG/M2 | TEMPERATURE: 99 F | DIASTOLIC BLOOD PRESSURE: 100 MMHG | HEIGHT: 64 IN

## 2022-05-02 DIAGNOSIS — M54.9 DORSALGIA, UNSPECIFIED: Primary | ICD-10-CM

## 2022-05-02 PROCEDURE — 96372 PR INJECTION,THERAP/PROPH/DIAG2ST, IM OR SUBCUT: ICD-10-PCS | Mod: ,,, | Performed by: NURSE PRACTITIONER

## 2022-05-02 PROCEDURE — 3008F PR BODY MASS INDEX (BMI) DOCUMENTED: ICD-10-PCS | Mod: ,,, | Performed by: NURSE PRACTITIONER

## 2022-05-02 PROCEDURE — 1159F PR MEDICATION LIST DOCUMENTED IN MEDICAL RECORD: ICD-10-PCS | Mod: ,,, | Performed by: NURSE PRACTITIONER

## 2022-05-02 PROCEDURE — 3077F SYST BP >= 140 MM HG: CPT | Mod: ,,, | Performed by: NURSE PRACTITIONER

## 2022-05-02 PROCEDURE — 3008F BODY MASS INDEX DOCD: CPT | Mod: ,,, | Performed by: NURSE PRACTITIONER

## 2022-05-02 PROCEDURE — 3077F PR MOST RECENT SYSTOLIC BLOOD PRESSURE >= 140 MM HG: ICD-10-PCS | Mod: ,,, | Performed by: NURSE PRACTITIONER

## 2022-05-02 PROCEDURE — 1159F MED LIST DOCD IN RCRD: CPT | Mod: ,,, | Performed by: NURSE PRACTITIONER

## 2022-05-02 PROCEDURE — 96372 THER/PROPH/DIAG INJ SC/IM: CPT | Mod: ,,, | Performed by: NURSE PRACTITIONER

## 2022-05-02 PROCEDURE — 3080F PR MOST RECENT DIASTOLIC BLOOD PRESSURE >= 90 MM HG: ICD-10-PCS | Mod: ,,, | Performed by: NURSE PRACTITIONER

## 2022-05-02 PROCEDURE — 99213 OFFICE O/P EST LOW 20 MIN: CPT | Mod: 25,,, | Performed by: NURSE PRACTITIONER

## 2022-05-02 PROCEDURE — 4010F ACE/ARB THERAPY RXD/TAKEN: CPT | Mod: ,,, | Performed by: NURSE PRACTITIONER

## 2022-05-02 PROCEDURE — 99213 PR OFFICE/OUTPT VISIT, EST, LEVL III, 20-29 MIN: ICD-10-PCS | Mod: 25,,, | Performed by: NURSE PRACTITIONER

## 2022-05-02 PROCEDURE — 3080F DIAST BP >= 90 MM HG: CPT | Mod: ,,, | Performed by: NURSE PRACTITIONER

## 2022-05-02 PROCEDURE — 4010F PR ACE/ARB THEARPY RXD/TAKEN: ICD-10-PCS | Mod: ,,, | Performed by: NURSE PRACTITIONER

## 2022-05-02 RX ORDER — KETOROLAC TROMETHAMINE 30 MG/ML
60 INJECTION, SOLUTION INTRAMUSCULAR; INTRAVENOUS
Status: COMPLETED | OUTPATIENT
Start: 2022-05-02 | End: 2022-05-02

## 2022-05-02 RX ORDER — DEXAMETHASONE SODIUM PHOSPHATE 4 MG/ML
4 INJECTION, SOLUTION INTRA-ARTICULAR; INTRALESIONAL; INTRAMUSCULAR; INTRAVENOUS; SOFT TISSUE
Status: COMPLETED | OUTPATIENT
Start: 2022-05-02 | End: 2022-05-02

## 2022-05-02 RX ADMIN — DEXAMETHASONE SODIUM PHOSPHATE 4 MG: 4 INJECTION, SOLUTION INTRA-ARTICULAR; INTRALESIONAL; INTRAMUSCULAR; INTRAVENOUS; SOFT TISSUE at 04:05

## 2022-05-02 RX ADMIN — KETOROLAC TROMETHAMINE 60 MG: 30 INJECTION, SOLUTION INTRAMUSCULAR; INTRAVENOUS at 04:05

## 2022-05-02 NOTE — PROGRESS NOTES
Imani Caldwell NP   [facility]  [address] [address]  [place]     PATIENT NAME: Becki Mejia  : 1960  DATE: 22  MRN: [id number]      Billing Provider: Imani Caldwell NP  Level of Service:   Patient PCP Information     Provider PCP Type    Imani Caldwell NP General          Reason for Visit / Chief Complaint: Back Pain       Update PCP  Update Chief Complaint         History of Present Illness / Problem Focused Workflow     Becki Mejia presents to the clinic c/o leg pain and swelling of left leg that radiates into left hip x several days, chest pain, discomfort at time , some sob and htn, bp has been elevated, also c/o headache      Review of Systems     Review of Systems   Constitutional: Negative for chills, fatigue and fever.   HENT: Negative for nasal congestion, ear pain, facial swelling, hearing loss, mouth dryness, mouth sores, postnasal drip, rhinorrhea, sinus pressure/congestion and goiter.    Eyes: Negative for discharge and itching.   Respiratory: Negative for cough, chest tightness, shortness of breath and wheezing.    Cardiovascular: Negative for chest pain and leg swelling.   Gastrointestinal: Negative for abdominal pain, change in bowel habit and change in bowel habit.   Genitourinary: Negative for difficulty urinating, dysuria, enuresis, frequency, hematuria and urgency.   Musculoskeletal: Positive for back pain and leg pain.   Neurological: Negative for dizziness, vertigo, syncope, weakness and headaches.   Psychiatric/Behavioral: Negative for decreased concentration.        Medical / Social / Family History     Past Medical History:   Diagnosis Date    Depression     Fibromyalgia        Past Surgical History:   Procedure Laterality Date    APPENDECTOMY      CARDIAC SURGERY      CORONARY ARTERY BYPASS GRAFT      HYSTERECTOMY      TOTAL    SPINE SURGERY      TONSILLECTOMY         Social History  Ms.  reports that she has never smoked. She has never used  smokeless tobacco. She reports that she does not drink alcohol and does not use drugs.    Family History  Ms.'s family history includes Cancer in her mother; Colon cancer in her sister; Diabetes in her father, mother, and paternal grandmother; Hypertension in her father and mother; Hypoglycemic in her sister; Parkinsonism in her father.    Medications and Allergies     Medications  Outpatient Medications Marked as Taking for the 5/2/22 encounter (Office Visit) with Imani Caldwell NP   Medication Sig Dispense Refill    aspirin (ECOTRIN) 81 MG EC tablet Take 81 mg by mouth once daily.      benazepriL (LOTENSIN) 40 MG tablet Take 1 tablet (40 mg total) by mouth nightly. 90 tablet 1    buPROPion (WELLBUTRIN XL) 150 MG TB24 tablet Take 1 tablet (150 mg total) by mouth once daily. 90 tablet 1    carvediloL (COREG) 25 MG tablet Take 1 tablet (25 mg total) by mouth 2 (two) times daily. 180 tablet 1    diclofenac (VOLTAREN) 75 MG EC tablet Take 1 tablet (75 mg total) by mouth 2 (two) times daily. 20 tablet 0    ferrous sulfate (FEOSOL) 325 mg (65 mg iron) Tab tablet Take 1 tablet (325 mg total) by mouth once daily. 90 tablet 1    hydrOXYzine HCL (ATARAX) 25 MG tablet Take 1 tablet (25 mg total) by mouth every evening. 90 tablet 1    levothyroxine (SYNTHROID) 50 MCG tablet TAKE ONE TABLET BY MOUTH EVERY DAY BEFORE BREAKFAST 90 tablet 1    loratadine (CLARITIN) 10 mg tablet TAKE 1 TABLET EVERY DAY 90 tablet 1    potassium chloride (MICRO-K) 10 MEQ CpSR Take 1 capsule (10 mEq total) by mouth once daily. 90 capsule 1    predniSONE (DELTASONE) 10 MG tablet Take 1 tablet (10 mg total) by mouth once daily. 10 tablet 0    spironolactone (ALDACTONE) 25 MG tablet Take 1 tablet (25 mg total) by mouth once daily. 90 tablet 1    topiramate (TOPAMAX) 50 MG tablet Take 1 tablet (50 mg total) by mouth 2 (two) times daily. 180 tablet 1    vitamin D (VITAMIN D3) 1000 units Tab Take 1 tablet (1,000 Units total) by mouth once  "daily. 90 tablet 1     Current Facility-Administered Medications for the 5/2/22 encounter (Office Visit) with Imani Caldwell NP   Medication Dose Route Frequency Provider Last Rate Last Admin    [COMPLETED] dexamethasone injection 4 mg  4 mg Intramuscular 1 time in Clinic/HOD Imani Caldwell NP   4 mg at 05/02/22 1648    [COMPLETED] ketorolac injection 60 mg  60 mg Intramuscular 1 time in Clinic/HOD Imani Caldwell NP   60 mg at 05/02/22 1653       Allergies  Review of patient's allergies indicates:   Allergen Reactions    Codeine Swelling    Codeine phosphate Other (See Comments)    Tetanus and diphtheria toxoids Itching    Tetanus immune globulin Other (See Comments)       Physical Examination     Vitals:    05/02/22 1617   BP: (!) 150/100   BP Location: Left arm   Patient Position: Sitting   BP Method: Medium (Manual)   Pulse: 79   Resp: 18   Temp: 98.5 °F (36.9 °C)   TempSrc: Oral   SpO2: 97%   Weight: 70.8 kg (156 lb)   Height: 5' 4.02" (1.626 m)      Physical Exam  Vitals and nursing note reviewed.   Constitutional:       Appearance: Normal appearance.      Comments: Pt does not appear to be in any acute distress   HENT:      Head: Normocephalic.      Right Ear: Tympanic membrane, ear canal and external ear normal.      Left Ear: Tympanic membrane, ear canal and external ear normal.      Nose: Nose normal.      Mouth/Throat:      Mouth: Mucous membranes are moist.      Pharynx: Oropharynx is clear.   Eyes:      Extraocular Movements: Extraocular movements intact.      Conjunctiva/sclera: Conjunctivae normal.      Pupils: Pupils are equal, round, and reactive to light.   Cardiovascular:      Rate and Rhythm: Normal rate and regular rhythm.      Pulses: Normal pulses.      Heart sounds: Normal heart sounds.      Comments: ekg without signs of ischemia  Pulmonary:      Effort: Pulmonary effort is normal.      Breath sounds: Normal breath sounds.   Abdominal:      General: Bowel sounds are normal.      " Palpations: Abdomen is soft.   Musculoskeletal:         General: Tenderness (pain in lower back and legs, rates pain carmelita 7-8, full rom to ext. leg lifts positive at 30 degrees) present. Normal range of motion.      Cervical back: Normal range of motion and neck supple.   Skin:     General: Skin is warm and dry.      Capillary Refill: Capillary refill takes less than 2 seconds.   Neurological:      General: No focal deficit present.      Mental Status: She is alert and oriented to person, place, and time.   Psychiatric:         Mood and Affect: Mood normal.         Behavior: Behavior normal.          Assessment and Plan (including Health Maintenance)      Problem List  Smart Sets  Document Outside HM   :  meds as ordered, avoid lifting more than 5 pounds, alternate heat and cold to back as needed for comfort, return to clinic as needed  Plan:     Dorsalgia, unspecified  -     MRI Lumbar Spine W WO Contrast; Future; Expected date: 05/02/2022  -     Cancel: Creatinine, serum; Future; Expected date: 05/02/2022  -     ketorolac injection 60 mg  -     dexamethasone injection 4 mg            Health Maintenance Due   Topic Date Due    Hepatitis C Screening  Never done    Colorectal Cancer Screening  Never done    COVID-19 Vaccine (4 - Booster for Moderna series) 04/20/2022       Problem List Items Addressed This Visit    None     Visit Diagnoses     Dorsalgia, unspecified        Relevant Medications    ketorolac injection 60 mg (Completed)    dexamethasone injection 4 mg (Completed)    Other Relevant Orders    MRI Lumbar Spine W WO Contrast            Health Maintenance Topics with due status: Not Due       Topic Last Completion Date    Mammogram 09/09/2021    Lipid Panel 04/18/2022       Procedures       Future Appointments   Date Time Provider Department Center   5/17/2022  9:15 AM Imani Caldwell NP [facility]   6/13/2022  8:30 AM AWJACKIE NURSE, [facility] FAMILY MEDICINE [facility]   7/19/2022  10:00 AM Imani Caldwell NP [facility]   8/9/2022  9:00 AM Pari Kirk [facility] CARD [place] [facility]        Follow up if symptoms worsen or fail to improve.       Signature:  Imani Caldwell NP    Date of encounter: 5/2/22

## 2022-05-18 ENCOUNTER — HOSPITAL ENCOUNTER (OUTPATIENT)
Dept: RADIOLOGY | Facility: HOSPITAL | Age: 62
Discharge: HOME | End: 2022-05-18
Attending: NURSE PRACTITIONER
Payer: MEDICARE

## 2022-05-18 DIAGNOSIS — M54.9 DORSALGIA, UNSPECIFIED: ICD-10-CM

## 2022-05-18 PROCEDURE — 25500020 PHARM REV CODE 255: Performed by: NURSE PRACTITIONER

## 2022-05-18 PROCEDURE — 72158 MRI LUMBAR SPINE W/O & W/DYE: CPT | Mod: TC

## 2022-05-18 RX ADMIN — GADOTERATE MEGLUMINE 14 ML: 376.9 INJECTION INTRAVENOUS at 08:05

## 2022-05-18 NOTE — PROGRESS NOTES
Needs to see neurosurgeon due to narrowing of spinal canal L3L4 is mod to severe, others are mild to mod

## 2022-05-19 DIAGNOSIS — M48.061 SPINAL STENOSIS OF LUMBAR REGION, UNSPECIFIED WHETHER NEUROGENIC CLAUDICATION PRESENT: Primary | ICD-10-CM

## 2022-05-25 ENCOUNTER — APPOINTMENT (OUTPATIENT)
Dept: RADIOLOGY | Facility: CLINIC | Age: 62
End: 2022-05-25
Attending: NURSE PRACTITIONER
Payer: MEDICARE

## 2022-05-25 ENCOUNTER — OFFICE VISIT (OUTPATIENT)
Dept: FAMILY MEDICINE | Facility: CLINIC | Age: 62
End: 2022-05-25
Payer: MEDICARE

## 2022-05-25 VITALS
TEMPERATURE: 99 F | RESPIRATION RATE: 16 BRPM | HEART RATE: 72 BPM | WEIGHT: 154.38 LBS | OXYGEN SATURATION: 98 % | DIASTOLIC BLOOD PRESSURE: 98 MMHG | HEIGHT: 64 IN | SYSTOLIC BLOOD PRESSURE: 190 MMHG | BODY MASS INDEX: 26.36 KG/M2

## 2022-05-25 DIAGNOSIS — M54.9 MID BACK PAIN: Primary | ICD-10-CM

## 2022-05-25 DIAGNOSIS — M54.9 MID BACK PAIN: ICD-10-CM

## 2022-05-25 PROCEDURE — 72070 XR THORACIC SPINE AP LATERAL: ICD-10-PCS | Mod: 26,,, | Performed by: RADIOLOGY

## 2022-05-25 PROCEDURE — 72070 X-RAY EXAM THORAC SPINE 2VWS: CPT | Mod: 26,,, | Performed by: RADIOLOGY

## 2022-05-25 PROCEDURE — 96372 THER/PROPH/DIAG INJ SC/IM: CPT | Mod: ,,, | Performed by: NURSE PRACTITIONER

## 2022-05-25 PROCEDURE — 3080F PR MOST RECENT DIASTOLIC BLOOD PRESSURE >= 90 MM HG: ICD-10-PCS | Mod: ,,, | Performed by: NURSE PRACTITIONER

## 2022-05-25 PROCEDURE — 3080F DIAST BP >= 90 MM HG: CPT | Mod: ,,, | Performed by: NURSE PRACTITIONER

## 2022-05-25 PROCEDURE — 3008F PR BODY MASS INDEX (BMI) DOCUMENTED: ICD-10-PCS | Mod: ,,, | Performed by: NURSE PRACTITIONER

## 2022-05-25 PROCEDURE — 3077F SYST BP >= 140 MM HG: CPT | Mod: ,,, | Performed by: NURSE PRACTITIONER

## 2022-05-25 PROCEDURE — 4010F PR ACE/ARB THEARPY RXD/TAKEN: ICD-10-PCS | Mod: ,,, | Performed by: NURSE PRACTITIONER

## 2022-05-25 PROCEDURE — 1159F PR MEDICATION LIST DOCUMENTED IN MEDICAL RECORD: ICD-10-PCS | Mod: ,,, | Performed by: NURSE PRACTITIONER

## 2022-05-25 PROCEDURE — 3008F BODY MASS INDEX DOCD: CPT | Mod: ,,, | Performed by: NURSE PRACTITIONER

## 2022-05-25 PROCEDURE — 99214 OFFICE O/P EST MOD 30 MIN: CPT | Mod: 25,,, | Performed by: NURSE PRACTITIONER

## 2022-05-25 PROCEDURE — 3077F PR MOST RECENT SYSTOLIC BLOOD PRESSURE >= 140 MM HG: ICD-10-PCS | Mod: ,,, | Performed by: NURSE PRACTITIONER

## 2022-05-25 PROCEDURE — 1159F MED LIST DOCD IN RCRD: CPT | Mod: ,,, | Performed by: NURSE PRACTITIONER

## 2022-05-25 PROCEDURE — 99214 PR OFFICE/OUTPT VISIT, EST, LEVL IV, 30-39 MIN: ICD-10-PCS | Mod: 25,,, | Performed by: NURSE PRACTITIONER

## 2022-05-25 PROCEDURE — 4010F ACE/ARB THERAPY RXD/TAKEN: CPT | Mod: ,,, | Performed by: NURSE PRACTITIONER

## 2022-05-25 PROCEDURE — 96372 PR INJECTION,THERAP/PROPH/DIAG2ST, IM OR SUBCUT: ICD-10-PCS | Mod: ,,, | Performed by: NURSE PRACTITIONER

## 2022-05-25 PROCEDURE — 72070 X-RAY EXAM THORAC SPINE 2VWS: CPT | Mod: TC,RHCUB | Performed by: NURSE PRACTITIONER

## 2022-05-25 RX ORDER — KETOROLAC TROMETHAMINE 30 MG/ML
60 INJECTION, SOLUTION INTRAMUSCULAR; INTRAVENOUS
Status: COMPLETED | OUTPATIENT
Start: 2022-05-25 | End: 2022-05-25

## 2022-05-25 RX ADMIN — KETOROLAC TROMETHAMINE 60 MG: 30 INJECTION, SOLUTION INTRAMUSCULAR; INTRAVENOUS at 05:05

## 2022-05-25 NOTE — PROGRESS NOTES
Answers for HPI/ROS submitted by the patient on 2022  activity change: Yes  unexpected weight change: No  neck pain: Yes  hearing loss: Yes  rhinorrhea: No  trouble swallowing: No  eye discharge: No  visual disturbance: No  chest tightness: No  wheezing: No  chest pain: No  palpitations: No  blood in stool: No  constipation: No  vomiting: No  diarrhea: No  polydipsia: No  polyuria: No  difficulty urinating: No  hematuria: No  menstrual problem: No  dysuria: No  joint swelling: No  arthralgias: No  headaches: No  weakness: No  confusion: No  dysphoric mood: No       Imani Caldwell NP   [facility]  [id number]  [place]     PATIENT NAME: Becki Mejia  : 1960  DATE: 22  MRN: [id number]      Billing Provider: Imani Caldwell NP  Level of Service:   Patient PCP Information     Provider PCP Type    Imani Caldwell NP General          Reason for Visit / Chief Complaint: Low-back Pain (F/u on back pain and here to discuss MRI results.)       Update PCP  Update Chief Complaint         History of Present Illness / Problem Focused Workflow     Becki Mejia presents to the clinic low back pain and her to discuss mri results      Review of Systems     Review of Systems   Constitutional: Positive for activity change. Negative for chills, fatigue, fever and unexpected weight change.   HENT: Negative for nasal congestion, ear pain, facial swelling, mouth dryness, mouth sores, postnasal drip, rhinorrhea, sinus pressure/congestion, trouble swallowing and goiter.    Eyes: Negative for discharge, itching and visual disturbance.   Respiratory: Negative for cough, chest tightness, shortness of breath and wheezing.    Cardiovascular: Negative for chest pain, palpitations and leg swelling.   Gastrointestinal: Negative for abdominal pain, blood in stool, change in bowel habit, constipation, diarrhea, vomiting and change in bowel habit.   Endocrine: Negative for polydipsia and polyuria.    Genitourinary: Negative for difficulty urinating, dysuria, enuresis, frequency, hematuria, menstrual problem and urgency.   Musculoskeletal: Positive for back pain and neck pain. Negative for arthralgias and joint swelling.   Neurological: Negative for dizziness, vertigo, syncope, weakness and headaches.   Psychiatric/Behavioral: Negative for confusion, decreased concentration and dysphoric mood.        Medical / Social / Family History     Past Medical History:   Diagnosis Date    Depression     Fibromyalgia        Past Surgical History:   Procedure Laterality Date    APPENDECTOMY      CARDIAC SURGERY      CORONARY ARTERY BYPASS GRAFT      HYSTERECTOMY      TOTAL    SPINE SURGERY      TONSILLECTOMY         Social History  Ms.  reports that she has never smoked. She has never used smokeless tobacco. She reports that she does not drink alcohol and does not use drugs.    Family History  Ms.'s family history includes Cancer in her mother; Colon cancer in her sister; Diabetes in her father, mother, and paternal grandmother; Hypertension in her father and mother; Hypoglycemic in her sister; Parkinsonism in her father.    Medications and Allergies     Medications  Outpatient Medications Marked as Taking for the 5/25/22 encounter (Office Visit) with Imani Caldwell NP   Medication Sig Dispense Refill    aspirin (ECOTRIN) 81 MG EC tablet Take 81 mg by mouth once daily.      benazepriL (LOTENSIN) 40 MG tablet Take 1 tablet (40 mg total) by mouth nightly. 90 tablet 1    buPROPion (WELLBUTRIN XL) 150 MG TB24 tablet Take 1 tablet (150 mg total) by mouth once daily. 90 tablet 1    carvediloL (COREG) 25 MG tablet Take 1 tablet (25 mg total) by mouth 2 (two) times daily. 180 tablet 1    diclofenac (VOLTAREN) 75 MG EC tablet Take 1 tablet (75 mg total) by mouth 2 (two) times daily. 20 tablet 0    ferrous sulfate (FEOSOL) 325 mg (65 mg iron) Tab tablet Take 1 tablet (325 mg total) by mouth once daily. 90 tablet 1     "hydrOXYzine HCL (ATARAX) 25 MG tablet Take 1 tablet (25 mg total) by mouth every evening. 90 tablet 1    levothyroxine (SYNTHROID) 50 MCG tablet TAKE ONE TABLET BY MOUTH EVERY DAY BEFORE BREAKFAST 90 tablet 1    loratadine (CLARITIN) 10 mg tablet TAKE 1 TABLET EVERY DAY 90 tablet 1    potassium chloride (MICRO-K) 10 MEQ CpSR Take 1 capsule (10 mEq total) by mouth once daily. 90 capsule 1    spironolactone (ALDACTONE) 25 MG tablet Take 1 tablet (25 mg total) by mouth once daily. 90 tablet 1    topiramate (TOPAMAX) 50 MG tablet Take 1 tablet (50 mg total) by mouth 2 (two) times daily. 180 tablet 1    vitamin D (VITAMIN D3) 1000 units Tab Take 1 tablet (1,000 Units total) by mouth once daily. 90 tablet 1       Allergies  Review of patient's allergies indicates:   Allergen Reactions    Codeine Swelling    Codeine phosphate Other (See Comments)    Tetanus and diphtheria toxoids Itching    Tetanus immune globulin Other (See Comments)       Physical Examination     Vitals:    05/25/22 1601   BP: (!) 190/98   BP Location: Left arm   Patient Position: Sitting   Pulse: 72   Resp: 16   Temp: 98.5 °F (36.9 °C)   TempSrc: Oral   SpO2: 98%   Weight: 70 kg (154 lb 6.4 oz)   Height: 5' 4" (1.626 m)      Physical Exam  Constitutional:       Appearance: Normal appearance.   HENT:      Head: Normocephalic.      Right Ear: Tympanic membrane, ear canal and external ear normal.      Left Ear: Tympanic membrane, ear canal and external ear normal.      Nose: Nose normal.      Mouth/Throat:      Mouth: Mucous membranes are moist.      Pharynx: Oropharynx is clear.   Eyes:      Extraocular Movements: Extraocular movements intact.      Conjunctiva/sclera: Conjunctivae normal.      Pupils: Pupils are equal, round, and reactive to light.   Cardiovascular:      Rate and Rhythm: Normal rate and regular rhythm.      Pulses: Normal pulses.      Heart sounds: Normal heart sounds.   Pulmonary:      Effort: Pulmonary effort is normal.      " Breath sounds: Normal breath sounds.   Abdominal:      General: Bowel sounds are normal.      Palpations: Abdomen is soft.   Musculoskeletal:         General: Tenderness (pain in mid to upper back today, rates pain 8-9, any movment increases pain, legs are not bothering her as much today as usual) present. Normal range of motion.   Skin:     General: Skin is warm and dry.      Capillary Refill: Capillary refill takes less than 2 seconds.   Neurological:      General: No focal deficit present.      Mental Status: She is alert and oriented to person, place, and time.   Psychiatric:         Mood and Affect: Mood normal.         Behavior: Behavior normal.          Assessment and Plan (including Health Maintenance)      Problem List  Smart Sets  Document Outside HM   :    Plan: avoid lifting, meds as ordered, alternate tyelnol and motrin every 4 hours as needed for pain, alternate heat and cold also, keep all scheduled carmelita. Return to clinic as needed    There are no diagnoses linked to this encounter.        Health Maintenance Due   Topic Date Due    Hepatitis C Screening  Never done    Colorectal Cancer Screening  Never done    COVID-19 Vaccine (4 - Booster for Moderna series) 04/20/2022       Problem List Items Addressed This Visit    None           Health Maintenance Topics with due status: Not Due       Topic Last Completion Date    Mammogram 09/09/2021    Lipid Panel 04/18/2022       Procedures     Future Appointments   Date Time Provider Department Center   6/13/2022  8:30 AM AWV NURSE, [facility] FAMILY MEDICINE [facility]   6/15/2022 11:00 AM Jose Solorzano MD [facility] SPINE [facility]   7/19/2022 10:00 AM Imani Caldwell NP [facility]   8/9/2022  9:00 AM ADRIANNA Figueroa [facility] CARD [facility]        No follow-ups on file.       Signature:  Imani Caldwell NP    Date of encounter: 5/25/22

## 2022-06-13 ENCOUNTER — OFFICE VISIT (OUTPATIENT)
Dept: FAMILY MEDICINE | Facility: CLINIC | Age: 62
End: 2022-06-13
Payer: MEDICARE

## 2022-06-13 VITALS
HEIGHT: 64 IN | TEMPERATURE: 98 F | BODY MASS INDEX: 25.98 KG/M2 | SYSTOLIC BLOOD PRESSURE: 120 MMHG | HEART RATE: 69 BPM | DIASTOLIC BLOOD PRESSURE: 80 MMHG | WEIGHT: 152.19 LBS | OXYGEN SATURATION: 98 % | RESPIRATION RATE: 18 BRPM

## 2022-06-13 DIAGNOSIS — Z78.0 POST-MENOPAUSAL: ICD-10-CM

## 2022-06-13 DIAGNOSIS — Z90.710 HISTORY OF TOTAL HYSTERECTOMY WITH BILATERAL SALPINGO-OOPHORECTOMY (BSO): ICD-10-CM

## 2022-06-13 DIAGNOSIS — R79.9 ABNORMAL BLOOD FINDING: ICD-10-CM

## 2022-06-13 DIAGNOSIS — Z90.79 HISTORY OF TOTAL HYSTERECTOMY WITH BILATERAL SALPINGO-OOPHORECTOMY (BSO): ICD-10-CM

## 2022-06-13 DIAGNOSIS — Z90.722 HISTORY OF TOTAL HYSTERECTOMY WITH BILATERAL SALPINGO-OOPHORECTOMY (BSO): ICD-10-CM

## 2022-06-13 DIAGNOSIS — Z00.00 ENCOUNTER FOR SUBSEQUENT ANNUAL WELLNESS VISIT (AWV) IN MEDICARE PATIENT: Primary | ICD-10-CM

## 2022-06-13 DIAGNOSIS — M85.80 OSTEOPENIA, UNSPECIFIED LOCATION: ICD-10-CM

## 2022-06-13 DIAGNOSIS — Z79.899 OTHER LONG TERM (CURRENT) DRUG THERAPY: ICD-10-CM

## 2022-06-13 PROCEDURE — 83690 ASSAY OF LIPASE: CPT | Mod: ,,, | Performed by: CLINICAL MEDICAL LABORATORY

## 2022-06-13 PROCEDURE — 3079F DIAST BP 80-89 MM HG: CPT | Mod: ,,, | Performed by: NURSE PRACTITIONER

## 2022-06-13 PROCEDURE — 82150 AMYLASE: ICD-10-PCS | Mod: ,,, | Performed by: CLINICAL MEDICAL LABORATORY

## 2022-06-13 PROCEDURE — 82550 CK: ICD-10-PCS | Mod: ,,, | Performed by: CLINICAL MEDICAL LABORATORY

## 2022-06-13 PROCEDURE — 3008F PR BODY MASS INDEX (BMI) DOCUMENTED: ICD-10-PCS | Mod: ,,, | Performed by: NURSE PRACTITIONER

## 2022-06-13 PROCEDURE — 3079F PR MOST RECENT DIASTOLIC BLOOD PRESSURE 80-89 MM HG: ICD-10-PCS | Mod: ,,, | Performed by: NURSE PRACTITIONER

## 2022-06-13 PROCEDURE — G0439 PR MEDICARE ANNUAL WELLNESS SUBSEQUENT VISIT: ICD-10-PCS | Mod: ,,, | Performed by: NURSE PRACTITIONER

## 2022-06-13 PROCEDURE — 4010F PR ACE/ARB THEARPY RXD/TAKEN: ICD-10-PCS | Mod: ,,, | Performed by: NURSE PRACTITIONER

## 2022-06-13 PROCEDURE — 83690 LIPASE: ICD-10-PCS | Mod: ,,, | Performed by: CLINICAL MEDICAL LABORATORY

## 2022-06-13 PROCEDURE — 3074F SYST BP LT 130 MM HG: CPT | Mod: ,,, | Performed by: NURSE PRACTITIONER

## 2022-06-13 PROCEDURE — 3008F BODY MASS INDEX DOCD: CPT | Mod: ,,, | Performed by: NURSE PRACTITIONER

## 2022-06-13 PROCEDURE — G0439 PPPS, SUBSEQ VISIT: HCPCS | Mod: ,,, | Performed by: NURSE PRACTITIONER

## 2022-06-13 PROCEDURE — 1159F PR MEDICATION LIST DOCUMENTED IN MEDICAL RECORD: ICD-10-PCS | Mod: ,,, | Performed by: NURSE PRACTITIONER

## 2022-06-13 PROCEDURE — 82150 ASSAY OF AMYLASE: CPT | Mod: ,,, | Performed by: CLINICAL MEDICAL LABORATORY

## 2022-06-13 PROCEDURE — 82550 ASSAY OF CK (CPK): CPT | Mod: ,,, | Performed by: CLINICAL MEDICAL LABORATORY

## 2022-06-13 PROCEDURE — 1159F MED LIST DOCD IN RCRD: CPT | Mod: ,,, | Performed by: NURSE PRACTITIONER

## 2022-06-13 PROCEDURE — 4010F ACE/ARB THERAPY RXD/TAKEN: CPT | Mod: ,,, | Performed by: NURSE PRACTITIONER

## 2022-06-13 PROCEDURE — 3074F PR MOST RECENT SYSTOLIC BLOOD PRESSURE < 130 MM HG: ICD-10-PCS | Mod: ,,, | Performed by: NURSE PRACTITIONER

## 2022-06-13 RX ORDER — SEMAGLUTIDE 0.5 MG/.5ML
INJECTION, SOLUTION SUBCUTANEOUS
COMMUNITY
Start: 2022-06-13 | End: 2023-01-19

## 2022-06-13 NOTE — PATIENT INSTRUCTIONS
Counseling and Referral of Other Preventative  (Italic type indicates deductible and co-insurance are waived)    Patient Name: Becki Mejia  Today's Date: 6/13/2022    Health Maintenance         Date Due Completion Date    Colorectal Cancer Screening Never done ---    Hepatitis C Screening 06/13/2023 (Originally 1960) ---    Shingles Vaccine (1 of 2) 06/13/2023 (Originally 7/29/2009) ---    COVID-19 Vaccine (4 - Booster for Moderna series) 06/13/2023 (Originally 4/20/2022) 12/20/2021    HIV Screening 06/09/2027 (Originally 7/29/1974) ---    Mammogram 09/09/2022 9/9/2021    DEXA Scan 07/22/2023 7/22/2020    Lipid Panel 04/18/2027 4/18/2022          No orders of the defined types were placed in this encounter.

## 2022-06-13 NOTE — PROGRESS NOTES
[facility]     PATIENT NAME: Becki Mejia   : 1960    AGE: 61 y.o. DATE: 2022   MRN: [id number]        Reason for Visit / Chief Complaint: Medicare AWV (Subsequent AWV )        Becki Mejia presents for a subsequent Medicare AWV today.     The following components were reviewed and updated:    Medical/Social/Family History:  Past Medical History:   Diagnosis Date    Depression     Fibromyalgia         Family History   Problem Relation Age of Onset    Cancer Mother     Diabetes Mother     Hypertension Mother     Parkinsonism Father     Diabetes Father     Hypertension Father     Colon cancer Sister     Hypoglycemic Sister     Diabetes Paternal Grandmother         Social History     Tobacco Use   Smoking Status Former Smoker    Packs/day: 1.00    Types: Cigarettes    Start date:     Quit date:     Years since quitting: 15.4   Smokeless Tobacco Never Used   Tobacco Comment    smoked off and on      Social History     Substance and Sexual Activity   Alcohol Use Never       Family History   Problem Relation Age of Onset    Cancer Mother     Diabetes Mother     Hypertension Mother     Parkinsonism Father     Diabetes Father     Hypertension Father     Colon cancer Sister     Hypoglycemic Sister     Diabetes Paternal Grandmother        Past Surgical History:   Procedure Laterality Date    APPENDECTOMY      CARDIAC SURGERY      CORONARY ARTERY BYPASS GRAFT      HYSTERECTOMY      TOTAL    SPINE SURGERY      TONSILLECTOMY           · Allergies and Current Medications     Review of patient's allergies indicates:   Allergen Reactions    Codeine Swelling    Codeine phosphate Other (See Comments)    Tetanus and diphtheria toxoids Itching    Tetanus immune globulin Other (See Comments)       Current Outpatient Medications:     aspirin (ECOTRIN) 81 MG EC tablet, Take 81 mg by mouth once daily., Disp: , Rfl:      benazepriL (LOTENSIN) 40 MG tablet, Take 1 tablet (40 mg total) by mouth nightly., Disp: 90 tablet, Rfl: 1    buPROPion (WELLBUTRIN XL) 150 MG TB24 tablet, Take 1 tablet (150 mg total) by mouth once daily., Disp: 90 tablet, Rfl: 1    carvediloL (COREG) 25 MG tablet, Take 1 tablet (25 mg total) by mouth 2 (two) times daily., Disp: 180 tablet, Rfl: 1    ferrous sulfate (FEOSOL) 325 mg (65 mg iron) Tab tablet, Take 1 tablet (325 mg total) by mouth once daily., Disp: 90 tablet, Rfl: 1    hydrOXYzine HCL (ATARAX) 25 MG tablet, Take 1 tablet (25 mg total) by mouth every evening., Disp: 90 tablet, Rfl: 1    levothyroxine (SYNTHROID) 50 MCG tablet, TAKE ONE TABLET BY MOUTH EVERY DAY BEFORE BREAKFAST, Disp: 90 tablet, Rfl: 1    loratadine (CLARITIN) 10 mg tablet, TAKE 1 TABLET EVERY DAY, Disp: 90 tablet, Rfl: 1    potassium chloride (MICRO-K) 10 MEQ CpSR, Take 1 capsule (10 mEq total) by mouth once daily., Disp: 90 capsule, Rfl: 1    spironolactone (ALDACTONE) 25 MG tablet, Take 1 tablet (25 mg total) by mouth once daily., Disp: 90 tablet, Rfl: 1    topiramate (TOPAMAX) 50 MG tablet, Take 1 tablet (50 mg total) by mouth 2 (two) times daily., Disp: 180 tablet, Rfl: 1    vitamin D (VITAMIN D3) 1000 units Tab, Take 1 tablet (1,000 Units total) by mouth once daily., Disp: 90 tablet, Rfl: 1    semaglutide, weight loss, (WEGOVY) 0.5 mg/0.5 mL PnIj, 0.5 mg, Disp: , Rfl:     · Health Risk Assessment   Fall Risk: No   Advance Directive: no Does not have an advanced directive. Verbal education and written education included in today's AVS.   Depression: PHQ 9 score of 6    HTN:  DASH diet, exercise, weight management, med compliance, home BP monitoring, and follow-up discussed.   T2DM: NO   Tobacco use: never  STI: N/A    Alcohol misuse: no   Statin Use: No      · Health Risk Assessment  Are you male or female?: Female  During the past four weeks, how much have you been bothered by emotional problems such as feeling  anxious, depressed, irritable, sad, or downhearted and blue?: Not at all  During the past five weeks, has your physical and/or emotional health limited your social activities with family, friends, neighbors, or groups?: Not at all  During the past four weeks, how much bodily pain have you generally had?: Moderate pain  During the past four weeks, was someone available to help if you needed and wanted help?: Yes, as much as I wanted  During the past four weeks, what was the hardest physical activity you could do for at least two minutes?: Light  Can you get to places out of walking distance without help?  (For example, can you travel alone on buses or taxis, or drive your own car?): Yes  Can you go shopping for groceries or clothes without someone's help?: Yes  Can you prepare your own meals?: Yes  Can you do your own housework without help?: Yes  Because of any health problems, do you need the help of another person with your personal care needs such as eating, bathing, dressing, or getting around the house?: No  Can you handle your own money without help?: Yes  During the past four weeks, how would you rate your health in general?: Good  How have things been going for you during the past four weeks?: Good and bad parts about equal  Are you having difficulties driving your car?: No  Do you always fasten your seat belt when you are in a car?: Yes, usually  How often in the past four weeks have you been bothered by falling or dizzy when standing up?: Sometimes  How often in the past four weeks have you been bothered by sexual problems?: Never  How often in the past four weeks have you been bothered by trouble eating well?: Seldom  How often in the past four weeks have you been bothered by teeth or denture problems?: Never  How often in the past four weeks have you been bothered with problems using the telephone?: Never  How often in the past four weeks have you been bothered by tiredness or fatigue?: Sometimes  Have  you fallen two or more times in the past year?: No  Are you afraid of falling?: No  Are you a smoker?: No  During the past four weeks, how many drinks of wine, beer, or other alcoholic beverages did you have?: No alcohol at all  Do you exercise for about 20 minutes three or more days a week?: No, I usually do not exercise this much  Have you been given any information to help you with hazards in your house that might hurt you?: Yes  Have you been given any information to help you with keeping track of your medications?: Yes  How often do you have trouble taking medicines the way you've been told to take them?: I always take them as prescribed  How confident are you that you can control and manage most of your health problems?: Very confident  What is your race? (Check all that apply.):     · Health Maintenance   Last eye exam: last year. refused   Last CV screen with lipids: 04/18/2022   Diabetes screening with fasting glucose or A1c: 04/18/2022   Colorectal cancer screen: reports due in 2023, 03/26/2013 at [facility]   Flu Vaccine: 10/08/2021   Pneumonia vaccines: refused   COVID vaccine: Moderna 04/02/2021, 04/30/2021, 12/20/2021   Hep B vaccine: N/A   DEXA: 07/22/2020   Last pap/pelvic: totally hysterectomy   Last Mammogram: 09/09/2021   Last PSA screen: N/A   AAA screening: N/A (once in lifetime for males 65-75 who have smoked > 100 cigarettes in lifetime)  HIV Screeing: N/A  Hepatitis C Screen: pt states she had done and was neg.   Low Dose CT Scan: N/A    Health Maintenance Topics with due status: Not Due       Topic Last Completion Date    Colorectal Cancer Screening 03/26/2013    DEXA Scan 07/22/2020    Mammogram 09/09/2021    Lipid Panel 04/18/2022     There are no preventive care reminders to display for this patient.     Incontinence  Bowel: no  Bladder: no    Lab results available in Epic or see dates from [facility] above:   Lab Results   Component Value Date    CHOL 128 04/18/2022    CHOL 151  07/06/2021     Lab Results   Component Value Date    HDL 50 04/18/2022    HDL 60 07/06/2021     Lab Results   Component Value Date    LDLCALC 66 04/18/2022    LDLCALC 69 07/06/2021     Lab Results   Component Value Date    TRIG 59 04/18/2022    TRIG 108 07/06/2021     Lab Results   Component Value Date    CHOLHDL 2.6 04/18/2022    CHOLHDL 2.5 07/06/2021       No results found for: LABA1C, HGBA1C    Sodium   Date Value Ref Range Status   04/18/2022 138 136 - 145 mmol/L Final     Potassium   Date Value Ref Range Status   04/18/2022 4.4 3.5 - 5.1 mmol/L Final     Chloride   Date Value Ref Range Status   04/18/2022 106 98 - 107 mmol/L Final     CO2   Date Value Ref Range Status   04/18/2022 25 21 - 32 mmol/L Final     Glucose   Date Value Ref Range Status   04/18/2022 89 74 - 106 mg/dL Final     BUN   Date Value Ref Range Status   04/18/2022 13 7 - 18 mg/dL Final     Creatinine   Date Value Ref Range Status   04/18/2022 0.94 0.55 - 1.02 mg/dL Final     Calcium   Date Value Ref Range Status   04/18/2022 8.4 (L) 8.5 - 10.1 mg/dL Final     Total Protein   Date Value Ref Range Status   04/18/2022 6.2 (L) 6.4 - 8.2 g/dL Final     Albumin   Date Value Ref Range Status   04/18/2022 3.6 3.5 - 5.0 g/dL Final     Bilirubin, Total   Date Value Ref Range Status   04/18/2022 0.3 0.0 - 1.2 mg/dL Final     Alk Phos   Date Value Ref Range Status   04/18/2022 52 50 - 130 U/L Final     AST   Date Value Ref Range Status   04/18/2022 32 15 - 37 U/L Final     ALT   Date Value Ref Range Status   04/18/2022 22 13 - 56 U/L Final     Anion Gap   Date Value Ref Range Status   04/18/2022 11 7 - 16 mmol/L Final     eGFR   Date Value Ref Range Status   04/18/2022 64 >=60 mL/min/1.73m² Final         No results found for: PSA         · Care Team   PCP: Caldwell    Eye specialist: Dr. Boyd  Other: Piter Cobb NP       **See Completed Assessments for Annual Wellness visit within the encounter summary    The following  "assessments were completed & reviewed:  · Depression Screening  · Cognitive function Screening  · Timed Get Up Test  · Whisper Test  · Vision Screen  · Health Risk Assessment  · Checklist of ADLs and IADLs      Objective  Vitals:    06/13/22 1405   BP: 120/80   Pulse: 69   Resp: 18   Temp: 98.4 °F (36.9 °C)   TempSrc: Oral   SpO2: 98%   Weight: 69 kg (152 lb 3.2 oz)   Height: 5' 4" (1.626 m)   PainSc:   5   PainLoc: Back      Body mass index is 26.13 kg/m².  Ideal body weight: 54.7 kg (120 lb 9.5 oz)   Heart healthy diet and exercise encouraged and education given.     Physical Exam  Vitals and nursing note reviewed.   Constitutional:       Appearance: Normal appearance.   HENT:      Head: Normocephalic.      Right Ear: Tympanic membrane, ear canal and external ear normal.      Left Ear: Tympanic membrane, ear canal and external ear normal.      Nose: Nose normal.      Mouth/Throat:      Mouth: Mucous membranes are moist.      Pharynx: Oropharynx is clear.   Eyes:      Extraocular Movements: Extraocular movements intact.      Conjunctiva/sclera: Conjunctivae normal.      Pupils: Pupils are equal, round, and reactive to light.   Cardiovascular:      Rate and Rhythm: Normal rate and regular rhythm.      Pulses: Normal pulses.      Heart sounds: Normal heart sounds.   Pulmonary:      Effort: Pulmonary effort is normal.      Breath sounds: Normal breath sounds.   Abdominal:      General: Bowel sounds are normal.      Palpations: Abdomen is soft.   Musculoskeletal:         General: Normal range of motion.   Skin:     General: Skin is warm and dry.      Capillary Refill: Capillary refill takes less than 2 seconds.   Neurological:      General: No focal deficit present.      Mental Status: She is alert and oriented to person, place, and time.   Psychiatric:         Mood and Affect: Mood normal.         Behavior: Behavior normal.           Assessment:     1. Osteopenia, unspecified location  - DXA Bone Density Spine And " Hip; Future    2. Post-menopausal  - DXA Bone Density Spine And Hip; Future    3. Abnormal blood finding  - CK; Future  - Amylase; Future  - Lipase; Future  - CK  - Amylase  - Lipase    4. Other long term (current) drug therapy  - CK; Future  - Amylase; Future  - Lipase; Future  - CK  - Amylase  - Lipase    5. BMI 26.0-26.9,adult    6. History of total hysterectomy with bilateral salpingo-oophorectomy (BSO)    7. Encounter for subsequent annual wellness visit (AWV) in Medicare patient         Plan:exercise daily, meds as ordered, return to clinic as needed    Discussed and provided with a screening schedule and personal prevention plan in accordance with USPSTF age appropriate recommendations and Medicare screening guidelines.   Education, counseling, and referrals were provided as needed.  After Visit Summary printed and given to patient which includes written education and a list of any referrals if indicated.     F/u plan for yearly AWV.

## 2022-06-14 DIAGNOSIS — M54.16 LUMBAR RADICULOPATHY: Primary | ICD-10-CM

## 2022-06-14 LAB
AMYLASE SERPL-CCNC: 41 U/L (ref 25–115)
CK SERPL-CCNC: 175 U/L (ref 26–192)
LIPASE SERPL-CCNC: 131 U/L (ref 73–393)

## 2022-06-15 ENCOUNTER — HOSPITAL ENCOUNTER (OUTPATIENT)
Dept: RADIOLOGY | Facility: HOSPITAL | Age: 62
Discharge: HOME | End: 2022-06-15
Attending: ORTHOPAEDIC SURGERY
Payer: MEDICARE

## 2022-06-15 ENCOUNTER — OFFICE VISIT (OUTPATIENT)
Dept: SPINE | Facility: CLINIC | Age: 62
End: 2022-06-15
Payer: MEDICARE

## 2022-06-15 DIAGNOSIS — M54.16 LUMBAR RADICULOPATHY: ICD-10-CM

## 2022-06-15 DIAGNOSIS — M48.061 SPINAL STENOSIS OF LUMBAR REGION, UNSPECIFIED WHETHER NEUROGENIC CLAUDICATION PRESENT: ICD-10-CM

## 2022-06-15 PROCEDURE — 1159F MED LIST DOCD IN RCRD: CPT | Mod: CPTII,,, | Performed by: ORTHOPAEDIC SURGERY

## 2022-06-15 PROCEDURE — 99204 OFFICE O/P NEW MOD 45 MIN: CPT | Mod: S$PBB,,, | Performed by: ORTHOPAEDIC SURGERY

## 2022-06-15 PROCEDURE — 4010F ACE/ARB THERAPY RXD/TAKEN: CPT | Mod: CPTII,,, | Performed by: ORTHOPAEDIC SURGERY

## 2022-06-15 PROCEDURE — 72110 XR LUMBAR SPINE 4-5 VIEW WITH BENDING VIEWS: ICD-10-PCS | Mod: 26,,, | Performed by: ORTHOPAEDIC SURGERY

## 2022-06-15 PROCEDURE — 72110 X-RAY EXAM L-2 SPINE 4/>VWS: CPT | Mod: TC

## 2022-06-15 PROCEDURE — 99214 OFFICE O/P EST MOD 30 MIN: CPT | Mod: PBBFAC | Performed by: ORTHOPAEDIC SURGERY

## 2022-06-15 PROCEDURE — 99204 PR OFFICE/OUTPT VISIT, NEW, LEVL IV, 45-59 MIN: ICD-10-PCS | Mod: S$PBB,,, | Performed by: ORTHOPAEDIC SURGERY

## 2022-06-15 PROCEDURE — 1159F PR MEDICATION LIST DOCUMENTED IN MEDICAL RECORD: ICD-10-PCS | Mod: CPTII,,, | Performed by: ORTHOPAEDIC SURGERY

## 2022-06-15 PROCEDURE — 72110 X-RAY EXAM L-2 SPINE 4/>VWS: CPT | Mod: 26,,, | Performed by: ORTHOPAEDIC SURGERY

## 2022-06-15 PROCEDURE — 4010F PR ACE/ARB THEARPY RXD/TAKEN: ICD-10-PCS | Mod: CPTII,,, | Performed by: ORTHOPAEDIC SURGERY

## 2022-06-15 NOTE — PROGRESS NOTES
MDM/time:  Greater than 45 minutes spent on this encounter including 15 minutes reviewing imaging and notes, 20 minutes with the patient, 10 minutes documentation    ASSESSMENT:  61 y.o. female with lumbar spondylosis with radiculopathy    PLAN:  Physical therapy lumbar spine  Refer to pain management to discuss possible epidural injections  Follow-up in 3 months    HPI:  61 y.o. female here for evaluation of low back pain that radiates into the left buttocks down the side of the left leg.  Patient reports she fell many years ago and had significant back pain ended up having surgery with  for L3-for a ruptured disc.  She reports she did well from this surgery is worse pain but developed a reaction to the sutures and up having a staph infection.  This back pain started approximately 3 weeks ago after doing a significant amount of driving back and forth to [place] [employer] and [employer] for her grandson.  Initially pain started in her sits bones and then radiated over to the left hip down the left leg.  She has had total of 2 IM steroid shots and a Medrol Dosepak from her primary care doctor which have not given her relief of pain.  Patient denies difficulty with  strength.  Denies difficulty with balance no recent falls.  Denies bladder bowel incontinence.  Increased pain with standing for any length of time or walking.  Currently taking diclofenac and ibuprofen as needed for pain.  No recent physical therapy.  Many years ago she did have facet injections with pain management that did not give her pain relief.  Recent MRI 2022 at [facility].  Prior spine surgery as discussed above.  No prior history of smoking.  She does have a history of a right ankle fusion.     IMAGIN/15/2022 lumbar spine x-ray reviewed showed:  On the AP there is normal coronal alignment.  There are 5 non-rib-bearing lumbar vertebrae.  On the lateral there is decreased lumbar lordosis.  There is spondylotic disease with  decreased disc height and osteophyte formation noted.  No fractures or listhesis noted.  No instability on flexion-extension views.    5/18/2022 lumbar spine MRI reviewed showed:  L1-2 severe left/moderate right lateral recess stenosis  L2-3 moderate central, severe bilateral lateral recess stenosis  L3-4 severe central/bilateral lateral recess stenosis, moderate bilateral foraminal stenosis  L4-5 severe left/moderate right lateral recess stenosis, severe bilateral foraminal stenosis  L5-S1 moderate left lateral recess stenosis    Past Medical History:   Diagnosis Date    Depression     Fibromyalgia      Past Surgical History:   Procedure Laterality Date    APPENDECTOMY      CARDIAC SURGERY      CORONARY ARTERY BYPASS GRAFT      HYSTERECTOMY      TOTAL    SPINE SURGERY      TONSILLECTOMY       Social History     Tobacco Use    Smoking status: Former Smoker     Packs/day: 1.00     Types: Cigarettes     Start date: 1988     Quit date: 2007     Years since quitting: 15.4    Smokeless tobacco: Never Used    Tobacco comment: smoked off and on   Substance Use Topics    Alcohol use: Never    Drug use: Never      Current Outpatient Medications   Medication Instructions    aspirin (ECOTRIN) 81 mg, Oral, Daily    benazepriL (LOTENSIN) 40 mg, Oral, Nightly    buPROPion (WELLBUTRIN XL) 150 mg, Oral, Daily    carvediloL (COREG) 25 mg, Oral, 2 times daily    ferrous sulfate (FEOSOL) 325 mg, Oral, Daily    hydrOXYzine HCL (ATARAX) 25 mg, Oral, Nightly    levothyroxine (SYNTHROID) 50 MCG tablet TAKE ONE TABLET BY MOUTH EVERY DAY BEFORE BREAKFAST    loratadine (CLARITIN) 10 mg tablet TAKE 1 TABLET EVERY DAY    potassium chloride (MICRO-K) 10 MEQ CpSR 10 mEq, Oral, Daily    semaglutide, weight loss, (WEGOVY) 0.5 mg/0.5 mL PnIj 0.5 mg    spironolactone (ALDACTONE) 25 mg, Oral, Daily    topiramate (TOPAMAX) 50 mg, Oral, 2 times daily    vitamin D (VITAMIN D3) 1,000 Units, Oral, Daily         EXAM:  Constitutional  General Appearance:  There is no height or weight on file to calculate BMI., NAD  Psychiatric   Orientation: Oriented to time, oriented to place, oriented to person  Mood and Affect: Active and alert, normal mood, normal affect  Gait and Station   Appearance:  Antalgic gait to the right due to right ankle fusion, unable to tandem gait, unable to walk on toes, unable to walk on heels    LUMBAR  Musculoskeletal System   Hips: Normal appearance, no leg length discrepancy, normal motion; left, normal motion; right    Lumbar Spine                   Inspection:  Normal alignment, normal sagittal balance                  Range of motion:  Decreased flexion, extension, lateral bending, rotation. Pain with range of motion                  Bony Palpation of the Lumbar Spine:  No tenderness of the spinous process, no tenderness of the sacrum, no tenderness of the coccyx                  Bony Palpation of the Right Hip:  No tenderness of the iliac crest, no tenderness of the sciatic notch, no tenderness of the SI joint                  Bony Palpation of the Left Hip:  No tenderness of the iliac crest, no tenderness of the sciatic notch, no tenderness of the SI joint                  Soft Tissue Palpation on the Right:  No tenderness of the paraspinal region, no tenderness of the iliolumbar region                  Soft Tissue Palpation on the Left:  No tenderness of the paraspinal region, no tenderness of the iliolumbar region    Motor Strength   L1 Right:  Hip flexion iliopsoas 5/5    L1 Left:  Hip flexion iliopsoas 4/5              L2-L4 Right:  Knee extension quadriceps 5/5, tibialis anterior 5/5              L2-L4 Left:  Knee extension quadriceps 4/5, tibialis anterior 4/5   L5 Right:  Extensor hallucis llongus 5/5,    L5 Left:  Extensor hallucis longus 4/5,    S1 Right:  Plantar flexion gastrocnemius 5/5   S1 Left:  Plantar flexion gastrocnemius 4/5    Neurological System   Ankle Reflex Right:   normal   Ankle Reflex Left: normal   Knee Reflex Right:  normal   Knee Reflex Left:  normal   Sensation on the Right:  L2 normal, L3 normal, L4 normal, L5 normal, S1 normal   Sensation on the Left:  L2 normal, L3 normal, L4 normal, L5 normal, S1 normal              Special Test on the Right:  Seated straight leg raising test negative, no clonus of the ankle              Special Test on the Left:  Seated straight leg raising test negative, no clonus of the ankle    Skin   Lumbosacral Spine:  Normal skin    Cardiovascular System   Arterial Pulses Right:  Posterior tibialis normal, dorsalis pedis normal   Arterial Pulses Left:  Posterior tibialis normal, dorsalis pedis normal   Edema Right: None   Edema Left:  None

## 2022-06-27 ENCOUNTER — CLINICAL SUPPORT (OUTPATIENT)
Dept: REHABILITATION | Facility: HOSPITAL | Age: 62
End: 2022-06-27
Payer: MEDICARE

## 2022-06-27 DIAGNOSIS — M48.061 SPINAL STENOSIS OF LUMBAR REGION, UNSPECIFIED WHETHER NEUROGENIC CLAUDICATION PRESENT: ICD-10-CM

## 2022-06-27 PROCEDURE — 97162 PT EVAL MOD COMPLEX 30 MIN: CPT

## 2022-06-27 NOTE — PLAN OF CARE
[facility]   Physical Therapy Initial Evaluation    Name: Becki Mejia  Clinic Number: [id number]    Therapy Diagnosis:   Encounter Diagnosis   Name Primary?    Spinal stenosis of lumbar region, unspecified whether neurogenic claudication present      Physician: Jose Solorzano MD    Physician Orders: PT Eval and Treat   Medical Diagnosis from Referral: M48.061 (ICD-10-CM) - Spinal stenosis of lumbar region, unspecified whether neurogenic claudication present   Evaluation Date: 6/27/2022  Authorization Period Expiration: TBD  Plan of Care Expiration: 8/17/2022  Visit # / Visits authorized: 1/ 12    Time In: 1:24 PM  Time Out: 2:00 PM  Total Appointment Time (timed & untimed codes): 36 minutes    Precautions: Standard    Subjective   Date of onset: About a month ago  History of current condition - Becki reports: pain while driving. She notes pain in her glutes that goes around to her hip and down to her ankle. She has a previous lumbar spine surgery approximately 3 years ago. She notes the pain is worse in a seated position. Her radicular pain is intermittent.     Medical History:   Past Medical History:   Diagnosis Date    Depression     Fibromyalgia        Surgical History:   Becki Mejia  has a past surgical history that includes Coronary artery bypass graft; Hysterectomy; Appendectomy; Spine surgery; Tonsillectomy; and Cardiac surgery.    Medications:   Becki has a current medication list which includes the following prescription(s): aspirin, benazepril, bupropion, carvedilol, ferrous sulfate, hydroxyzine hcl, levothyroxine, loratadine, potassium chloride, wegovy, spironolactone, topiramate, and vitamin d.    Allergies:   Review of patient's allergies indicates:   Allergen Reactions    Codeine Swelling    Codeine phosphate Other (See Comments)    Tetanus and diphtheria toxoids Itching    Tetanus immune globulin Other (See Comments)        Imaging, X:Ray: On the AP there  is normal coronal alignment. There are 5 non-rib-bearing lumbar vertebrae. On the lateral there is decreased lumbar lordosis. There is spondylotic disease with decreased disc height and osteophyte formation noted. No fractures or listhesis noted. No instability on flexion-extension views.    MRI: Intervertebral disc space levels:     L1-L2: Moderate loss of disc space height.  Diffuse disc bulge with posterior facet and ligamentum flavum hypertrophy. Moderate spinal canal narrowing. Mild-to-moderate right and mild-to-moderate left neuroforaminal narrowing.     L2-L3: Mild-to-moderate loss of disc space height.  Diffuse disc bulge with posterior facet and ligamentum flavum hypertrophy. Moderate spinal canal narrowing. Mild right and mild-to-moderate left neuroforaminal narrowing.     L3-L4: Mild-to-moderate loss of disc space height.  Diffuse disc bulge with posterior facet and ligamentum flavum hypertrophy. Moderate to severe spinal canal narrowing. Moderate right and moderate left neuroforaminal narrowing.     L4-L5: Mild-to-moderate loss of disc space height.  Diffuse disc bulge with posterior facet and ligamentum flavum hypertrophy. Moderate spinal canal narrowing. Moderate right and moderate left neuroforaminal narrowing.     L5-S1: Mild posterior facet arthropathy.  Minimal spinal canal narrowing.  Minimal bilateral neural foraminal narrowing.  Mild right-sided perifacet inflammatory change/edema and enhancement.    Prior Therapy: None  Social History:  lives with their family  Occupation: Retired/Disabled  Prior Level of Function: Independent    Pain:  Current 4/10, worst 8/10, best 1/10   Location: lower back   Description: Aching and Shooting  Aggravating Factors: Sitting, Standing and Bending  Easing Factors: heating pad and TENS unit    Objective     Posture:  1. Standing lordosis: Increased  2. Sitting lordosis: WNL  3. Iliac crest height:  equal  4. PSIS height:  equal  5. Pelvic rotation/torsion:  no  6. Scoliosis: no  7. Lateral shift: None  8. Comments:      Increased pain coming up from forward flexion  Pain with left rotation on right side superior to psis  Same spot with both left and right sidebending  Radicular pain on the right side that is exacerbated with backbending   Structural leg difference. Right leg short    MANUAL MUSCLE TEST  Right left   Hip flexion MMT number: 3+/5 MMT strength: 3+/5   Hip abduction MMT strength: 3-/5 MMT strength: 3/5   Knee extension MMT strength: 4/5 MMT strength: 4/5   Knee flexion  MMT strength: 4+/5 MMT strength: 4+/5   Ankle dorsiflexion MMT strength: 5/5 MMT strength: 5/5   Ankle plantar flexion  MMT strength: 4+/5 MMT strength: 4+/5   Extensor hallucis longus MMT strength: 5/5 MMT strength: 5/5     ROM/flexibility right left   Hip flexion (120) 110 110   Internal rotation (45) 30 30   External rotation (45) 45 45   Hamstring 90/90 (-10) -3 -3   Rectus femoris (120) 108 108   Other     Other       Special test Right  Left    SLR test < 60 degrees Negative Negative   SLR test > 60 degrees Negative Negative   Sitting slump test Negative Negative   Piriformis test Negative Negative   SHEREE test Positive Negative   SI forward bend Negative Negative   SI distraction Negative Negative   SI compression Negative Negative         Limitation/Restriction for FOTO  Survey    Therapist reviewed FOTO scores for Becki Mejia on 6/27/2022.   FOTO documents entered into [employer] - see Media section.    Limitation Score: 51%         Assessment   Becki is a 61 y.o. female referred to outpatient Physical Therapy with a medical diagnosis of spinal stenosis. Pt presents with reduced pain free ROM, reduced hip flexor mobility, increased anterior pelvic tilt, radicular pain along the obturator nerve distribution.    Pt prognosis is Excellent.   Pt will benefit from skilled outpatient Physical Therapy to address the deficits stated above and in the chart below, provide pt/family  education, and to maximize pt's level of independence.     Plan of care discussed with patient: Yes  Pt's spiritual, cultural and educational needs considered and patient is agreeable to the plan of care and goals as stated below:     Anticipated Barriers for therapy: None        Goals:  Short Term Goals: 2 weeks   1. Patient will be Independent with Home Exercise Program   2. Patient will demonstrate with improved Posture and Body Mechanics  3. Patient will increase Lumbosacral Range of Motion by 25%   4. Patient will increase Lower Extremity and Core Strength to grossly 4+/5  5. Patient will decrease complaints of pain with activity to Less than or Equal to 5/10     Long Term Goals: 6 weeks   1. Patient will tolerate 30 minutes of activity with complaints of Pain at Less Than or Equal to 4/10     Plan   Plan of care Certification: 6/27/2022 to 8/17/2022.    Outpatient Physical Therapy 2 times weekly for 6 weeks to include the following interventions: Electrical Stimulation IFC, Manual Therapy, Moist Heat/ Ice, Neuromuscular Re-ed, Patient Education, Therapeutic Activities and Therapeutic Exercise.     León Owens, PT

## 2022-07-06 ENCOUNTER — CLINICAL SUPPORT (OUTPATIENT)
Dept: REHABILITATION | Facility: HOSPITAL | Age: 62
End: 2022-07-06
Payer: MEDICARE

## 2022-07-06 DIAGNOSIS — M48.061 SPINAL STENOSIS OF LUMBAR REGION, UNSPECIFIED WHETHER NEUROGENIC CLAUDICATION PRESENT: Primary | ICD-10-CM

## 2022-07-06 PROCEDURE — 97110 THERAPEUTIC EXERCISES: CPT | Mod: CQ

## 2022-07-06 PROCEDURE — 97014 ELECTRIC STIMULATION THERAPY: CPT | Mod: CQ

## 2022-07-06 NOTE — PROGRESS NOTES
Physical Therapy Treatment Note     [id number]    Therapy Diagnosis: No diagnosis found.  Physician: Jose Solorzano MD    Visit Date: 7/6/2022  Physician Orders: PT Eval and Treat   Medical Diagnosis from Referral: M48.061 (ICD-10-CM) - Spinal stenosis of lumbar region, unspecified whether neurogenic claudication present   Evaluation Date: 6/27/2022  Authorization Period Expiration: TBD  Plan of Care Expiration: 8/17/2022  Visit # / Visits authorized: 1/ 12  Plan of Care Certification Period: 06/27/2022 to 08/17/2022  Updated Plan of Care Due:     PTA Visit #: 1    Time In: 12:26  Time Out: 13:13  Total Billable Time:  47 minutes    Precautions: Standard  Functional Level Prior to Evaluation:     Subjective     Pt reports:  Pain in lower back  She was compliant with home exercise program.    Pain: 5/10  Location: bilateral back      Objective     Becki received therapeutic exercises to develop strength, endurance, ROM, flexibility and core stabilization for    37   minutes including:  Sci Fit Stepper x 6 min  Bridging 1x10   Pelvic tilts 1x15  SKTC 10x10 sec holds  DKTC 1x10  Trunk rotation with swiss ball 1x10  Child's pose 5x10 sec   Curl ups 1x10 in hooklying        (Not This Visit)Becki received the following manual therapy techniques:         were applied to the:       for    minutes, including:        Becki received the following supervised modalities after being cleared for contradictions: IFC Electrical Stimulation:  Becki received IFC Electrical Stimulation for pain control applied to the lower lumbar with moist hot pack used concurrently with 6-8 towel layers with pt in seated positioning. Pt received stimulation at 100 % scan at a frequency of 19.5  for 10 minutes. Becki tolderated treatment well without any adverse effects.      .      Home Exercises Provided and Patient Education Provided     Education provided: (next visit)    Written Home  Exercises Provided: yes.  Exercises were reviewed and Becki was able to demonstrate them prior to the end of the session.  Becki demonstrated good  understanding of the education provided.     See EMR under Media for exercises provided .    Assessment   Pt tolerated Tx well, therapist provided skilled cues for correct positioning with each exercise.       Becki Is progressing well towards her goals.   Pt prognosis is Excellent.     Pt will continue to benefit from skilled outpatient physical therapy to address the deficits listed in the problem list box on initial evaluation, provide pt/family education and to maximize pt's level of independence in the home and community environment.     Pt's spiritual, cultural and educational needs considered and pt agreeable to plan of care and goals.     Anticipated barriers to physical therapy: none      Goals:  Short Term Goals: 2 weeks   1. Patient will be Independent with Home Exercise Program   2. Patient will demonstrate with improved Posture and Body Mechanics  3. Patient will increase Lumbosacral Range of Motion by 25%   4. Patient will increase Lower Extremity and Core Strength to grossly 4+/5  5. Patient will decrease complaints of pain with activity to Less than or Equal to 5/10      Long Term Goals: 6 weeks   1. Patient will tolerate 30 minutes of activity with complaints of Pain at Less Than or Equal to 4/10           Plan   Pt will continue with POC.    Sonia Balderas, PTA  7/6/2022

## 2022-07-08 ENCOUNTER — CLINICAL SUPPORT (OUTPATIENT)
Dept: REHABILITATION | Facility: HOSPITAL | Age: 62
End: 2022-07-08
Payer: MEDICARE

## 2022-07-08 DIAGNOSIS — M48.061 SPINAL STENOSIS OF LUMBAR REGION, UNSPECIFIED WHETHER NEUROGENIC CLAUDICATION PRESENT: Primary | ICD-10-CM

## 2022-07-08 PROCEDURE — 97014 ELECTRIC STIMULATION THERAPY: CPT | Mod: CQ

## 2022-07-08 PROCEDURE — 97110 THERAPEUTIC EXERCISES: CPT | Mod: CQ

## 2022-07-08 NOTE — PROGRESS NOTES
Physical Therapy Treatment Note     Name: Becki Mejia  [facility] Number: [id number]    Therapy Diagnosis:   Encounter Diagnosis   Name Primary?    Spinal stenosis of lumbar region, unspecified whether neurogenic claudication present Yes     Physician: Jose Solorzano MD    Visit Date: 7/8/2022  Physician Orders: PT Eval and Treat   Medical Diagnosis from Referral: M48.061 (ICD-10-CM) - Spinal stenosis of lumbar region, unspecified whether neurogenic claudication present   Evaluation Date: 6/27/2022  Authorization Period Expiration: TBD  Plan of Care Expiration: 8/17/2022  Visit # / Visits authorized: 3/12  Plan of Care Certification Period: 06/27/2022 to 08/17/2022  Updated Plan of Care Due:     PTA Visit #: 2    Time In: 1438  Time Out: 1523  Total Billable Time:     minutes    Precautions: Standard  Functional Level Prior to Evaluation:     Subjective     Pt reports:  Pain in lower back  She was compliant with home exercise program.    Pain: 4/10  Location: bilateral back      Objective     Becki received therapeutic exercises to develop strength, endurance, ROM, flexibility and core stabilization for  30 minutes including:  Sci Fit Stepper x 6 min (unable due to R ankle fusion)  Bridging 2x10  Pelvic tilts 15 x 3 sec holds each  SKTC 10 x 5 sec holds  DKTC 10 x 5 sec holds (with towel)  Trunk rotation with swiss ball 1x10  Child's pose 5x10 sec   Curl ups 1x10 in hooklying    HEP program (5 min)        (Not This Visit)Becki received the following manual therapy techniques:         were applied to the:       for    minutes, including:        Becki received the following supervised modalities after being cleared for contradictions: IFC Electrical Stimulation:  Becki received IFC Electrical Stimulation for pain control applied to the lower lumbar with moist hot pack used concurrently with 6-8 towel layers with pt in seated positioning. Pt received stimulation at 100 % scan at a frequency of 19.5   for 15 minutes. Skin was assessed before/after Tx, Becki tolderated treatment well without any adverse effects.      .      Home Exercises Provided and Patient Education Provided     Education provided: yes HEP handout    Written Home Exercises Provided: yes.  Exercises were reviewed and Becki was able to demonstrate them prior to the end of the session.  Becik demonstrated good  understanding of the education provided.     See EMR under Media for exercises provided .    Assessment   Pt tolerated Tx well, therapist provided skilled cues for correct positioning with each exercise.       Becki Is progressing well towards her goals.   Pt prognosis is Excellent.     Pt will continue to benefit from skilled outpatient physical therapy to address the deficits listed in the problem list box on initial evaluation, provide pt/family education and to maximize pt's level of independence in the home and community environment.     Pt's spiritual, cultural and educational needs considered and pt agreeable to plan of care and goals.     Anticipated barriers to physical therapy: none      Goals:  Short Term Goals: 2 weeks   1. Patient will be Independent with Home Exercise Program   2. Patient will demonstrate with improved Posture and Body Mechanics  3. Patient will increase Lumbosacral Range of Motion by 25%   4. Patient will increase Lower Extremity and Core Strength to grossly 4+/5  5. Patient will decrease complaints of pain with activity to Less than or Equal to 5/10      Long Term Goals: 6 weeks   1. Patient will tolerate 30 minutes of activity with complaints of Pain at Less Than or Equal to 4/10           Plan   Pt will continue with POC.    Sonia Balderas, PTA  7/8/2022

## 2022-07-15 ENCOUNTER — CLINICAL SUPPORT (OUTPATIENT)
Dept: REHABILITATION | Facility: HOSPITAL | Age: 62
End: 2022-07-15
Payer: MEDICARE

## 2022-07-15 ENCOUNTER — HOSPITAL ENCOUNTER (OUTPATIENT)
Dept: RADIOLOGY | Facility: HOSPITAL | Age: 62
Discharge: HOME | End: 2022-07-15
Attending: NURSE PRACTITIONER
Payer: MEDICARE

## 2022-07-15 DIAGNOSIS — Z78.0 POST-MENOPAUSAL: ICD-10-CM

## 2022-07-15 DIAGNOSIS — M85.80 OSTEOPENIA, UNSPECIFIED LOCATION: ICD-10-CM

## 2022-07-15 DIAGNOSIS — M48.061 SPINAL STENOSIS OF LUMBAR REGION, UNSPECIFIED WHETHER NEUROGENIC CLAUDICATION PRESENT: Primary | ICD-10-CM

## 2022-07-15 PROCEDURE — 77080 DXA BONE DENSITY AXIAL: CPT | Mod: TC

## 2022-07-15 PROCEDURE — 97110 THERAPEUTIC EXERCISES: CPT | Mod: CQ

## 2022-07-15 PROCEDURE — 97014 ELECTRIC STIMULATION THERAPY: CPT | Mod: CQ

## 2022-07-15 NOTE — PROGRESS NOTES
"  Physical Therapy Treatment Note     Name: Becki Mejia  [facility] Number: [id number]    Therapy Diagnosis:   Encounter Diagnosis   Name Primary?    Spinal stenosis of lumbar region, unspecified whether neurogenic claudication present Yes     Physician: Jose Solorzano MD    Visit Date: 7/15/2022  Physician Orders: PT Eval and Treat   Medical Diagnosis from Referral: M48.061 (ICD-10-CM) - Spinal stenosis of lumbar region, unspecified whether neurogenic claudication present   Evaluation Date: 6/27/2022  Authorization Period Expiration: TBD  Plan of Care Expiration: 8/17/2022  Visit # / Visits authorized: 4/12  Plan of Care Certification Period: 06/27/2022 to 08/17/2022  Updated Plan of Care Due:     PTA Visit #: 3    Time In: 1308  Time Out: 1346  Total Billable Time:  38   minutes    Precautions: Standard  Functional Level Prior to Evaluation:     Subjective     Pt reports:  Pain in lower back has decreased, "I took a pain pill this morning that has helped".  She was compliant with home exercise program.    Pain: 3/10  Location: bilateral back      Objective     Becki received therapeutic exercises to develop strength, endurance, ROM, flexibility and core stabilization for 26 minutes including:  Sci Fit Stepper x 6 min (unable due to R ankle fusion)  Bridging 2x10  Pelvic tilts 15 x 3 sec holds each  SKTC 10 x 5 sec holds  DKTC 10 x 5 sec holds (with towel)  Trunk rotation with swiss ball 1x10  Hip ABD in sidelying (next visit)  Hip flexor stretching 5 x 10 sec each  Child's pose 5x10 sec   Curl ups 1x10 with 5 sec holds  in hooklying          (Not This Visit)Becki received the following manual therapy techniques:         were applied to the:       for    minutes, including:        Becki received the following supervised modalities after being cleared for contradictions: IFC Electrical Stimulation:  Becki received IFC Electrical Stimulation for pain control applied to the lower lumbar with moist " hot pack used concurrently with 6-8 towel layers with pt in seated positioning. Pt received stimulation at 100 % scan at a frequency of 19.5  for 12 minutes. Skin was assessed before/after Tx, Becki tolderated treatment well without any adverse effects.      .      Home Exercises Provided and Patient Education Provided     Education provided: yes HEP handout    Written Home Exercises Provided: yes.  Exercises were reviewed and Becki was able to demonstrate them prior to the end of the session.  Becki demonstrated good  understanding of the education provided.     See EMR under Media for exercises provided .    Assessment   Pt tolerated Tx well, therapist provided skilled cues for correct positioning with each exercise.       Becki Is progressing well towards her goals.   Pt prognosis is Excellent.     Pt will continue to benefit from skilled outpatient physical therapy to address the deficits listed in the problem list box on initial evaluation, provide pt/family education and to maximize pt's level of independence in the home and community environment.     Pt's spiritual, cultural and educational needs considered and pt agreeable to plan of care and goals.     Anticipated barriers to physical therapy: none      Goals:  Short Term Goals: 2 weeks   1. Patient will be Independent with Home Exercise Program   2. Patient will demonstrate with improved Posture and Body Mechanics  3. Patient will increase Lumbosacral Range of Motion by 25%   4. Patient will increase Lower Extremity and Core Strength to grossly 4+/5  5. Patient will decrease complaints of pain with activity to Less than or Equal to 5/10      Long Term Goals: 6 weeks   1. Patient will tolerate 30 minutes of activity with complaints of Pain at Less Than or Equal to 4/10           Plan   Pt will continue with POC.    Sonia Balderas, PTA  7/15/2022

## 2022-07-19 ENCOUNTER — OFFICE VISIT (OUTPATIENT)
Dept: FAMILY MEDICINE | Facility: CLINIC | Age: 62
End: 2022-07-19
Payer: MEDICARE

## 2022-07-19 VITALS
HEART RATE: 70 BPM | OXYGEN SATURATION: 98 % | WEIGHT: 156.81 LBS | BODY MASS INDEX: 26.77 KG/M2 | DIASTOLIC BLOOD PRESSURE: 60 MMHG | RESPIRATION RATE: 18 BRPM | TEMPERATURE: 98 F | HEIGHT: 64 IN | SYSTOLIC BLOOD PRESSURE: 124 MMHG

## 2022-07-19 DIAGNOSIS — I10 ESSENTIAL HYPERTENSION: ICD-10-CM

## 2022-07-19 DIAGNOSIS — M85.89 OSTEOPENIA OF MULTIPLE SITES: Primary | ICD-10-CM

## 2022-07-19 DIAGNOSIS — M79.7 FIBROMYALGIA: ICD-10-CM

## 2022-07-19 DIAGNOSIS — E03.9 HYPOTHYROIDISM, UNSPECIFIED TYPE: ICD-10-CM

## 2022-07-19 DIAGNOSIS — F32.A DEPRESSION, UNSPECIFIED DEPRESSION TYPE: ICD-10-CM

## 2022-07-19 PROCEDURE — 1159F MED LIST DOCD IN RCRD: CPT | Mod: ,,, | Performed by: NURSE PRACTITIONER

## 2022-07-19 PROCEDURE — 99214 OFFICE O/P EST MOD 30 MIN: CPT | Mod: ,,, | Performed by: NURSE PRACTITIONER

## 2022-07-19 PROCEDURE — 4010F ACE/ARB THERAPY RXD/TAKEN: CPT | Mod: ,,, | Performed by: NURSE PRACTITIONER

## 2022-07-19 PROCEDURE — 3008F BODY MASS INDEX DOCD: CPT | Mod: ,,, | Performed by: NURSE PRACTITIONER

## 2022-07-19 PROCEDURE — 3074F SYST BP LT 130 MM HG: CPT | Mod: ,,, | Performed by: NURSE PRACTITIONER

## 2022-07-19 PROCEDURE — 99214 PR OFFICE/OUTPT VISIT, EST, LEVL IV, 30-39 MIN: ICD-10-PCS | Mod: ,,, | Performed by: NURSE PRACTITIONER

## 2022-07-19 PROCEDURE — 1159F PR MEDICATION LIST DOCUMENTED IN MEDICAL RECORD: ICD-10-PCS | Mod: ,,, | Performed by: NURSE PRACTITIONER

## 2022-07-19 PROCEDURE — 4010F PR ACE/ARB THEARPY RXD/TAKEN: ICD-10-PCS | Mod: ,,, | Performed by: NURSE PRACTITIONER

## 2022-07-19 PROCEDURE — 3078F DIAST BP <80 MM HG: CPT | Mod: ,,, | Performed by: NURSE PRACTITIONER

## 2022-07-19 PROCEDURE — 3074F PR MOST RECENT SYSTOLIC BLOOD PRESSURE < 130 MM HG: ICD-10-PCS | Mod: ,,, | Performed by: NURSE PRACTITIONER

## 2022-07-19 PROCEDURE — 3078F PR MOST RECENT DIASTOLIC BLOOD PRESSURE < 80 MM HG: ICD-10-PCS | Mod: ,,, | Performed by: NURSE PRACTITIONER

## 2022-07-19 PROCEDURE — 3008F PR BODY MASS INDEX (BMI) DOCUMENTED: ICD-10-PCS | Mod: ,,, | Performed by: NURSE PRACTITIONER

## 2022-07-19 RX ORDER — MULTIVITAMIN
1 TABLET ORAL ONCE
Qty: 90 TABLET | Refills: 1 | Status: SHIPPED | OUTPATIENT
Start: 2022-07-19 | End: 2023-01-24

## 2022-07-19 NOTE — PROGRESS NOTES
Imani Caldwell NP   [facility]  [address] [address] [place]     PATIENT NAME: Becki Mejia  : 1960  DATE: 22  MRN: [id number]      Billing Provider: Imani Caldwell NP  Level of Service: ME OFFICE/OUTPT VISIT, EST, LEVL IV, 30-39 MIN  Patient PCP Information     Provider PCP Type    Imani Caldwell NP General          Reason for Visit / Chief Complaint: Follow-up (3 MONTH F/UP R/T HTN, DEPRESSION, ANXIETY)       Update PCP  Update Chief Complaint         History of Present Illness / Problem Focused Workflow     Becki Mejia presents to the clinic here for 3 month eval of htn, depression, anxiety,       Review of Systems     Review of Systems   Constitutional: Negative for chills, fatigue and fever.   HENT: Negative for nasal congestion, ear pain, facial swelling, hearing loss, mouth dryness, mouth sores, postnasal drip, rhinorrhea, sinus pressure/congestion and goiter.    Eyes: Negative for discharge and itching.   Respiratory: Negative for cough, shortness of breath and wheezing.    Cardiovascular: Negative for chest pain and leg swelling.   Gastrointestinal: Negative for abdominal pain, change in bowel habit and change in bowel habit.   Genitourinary: Negative for difficulty urinating, dysuria, enuresis, frequency, hematuria and urgency.   Neurological: Negative for dizziness, vertigo, syncope, weakness and headaches.   Psychiatric/Behavioral: Negative for decreased concentration.        Medical / Social / Family History     Past Medical History:   Diagnosis Date    Depression     Fibromyalgia        Past Surgical History:   Procedure Laterality Date    APPENDECTOMY      CARDIAC SURGERY      CORONARY ARTERY BYPASS GRAFT      HYSTERECTOMY      TOTAL    SPINE SURGERY      TONSILLECTOMY         Social History  Ms.  reports that she quit smoking about 15 years ago. Her smoking use included cigarettes. She started smoking about 34 years ago. She smoked 1.00 pack per day. She  has never used smokeless tobacco. She reports that she does not drink alcohol and does not use drugs.    Family History  Ms.'s family history includes Cancer in her mother; Colon cancer in her sister; Diabetes in her father, mother, and paternal grandmother; Hypertension in her father and mother; Hypoglycemic in her sister; Parkinsonism in her father.    Medications and Allergies     Medications  Outpatient Medications Marked as Taking for the 7/19/22 encounter (Office Visit) with Imani Caldwell NP   Medication Sig Dispense Refill    aspirin (ECOTRIN) 81 MG EC tablet Take 81 mg by mouth once daily.      benazepriL (LOTENSIN) 40 MG tablet Take 1 tablet (40 mg total) by mouth nightly. 90 tablet 1    buPROPion (WELLBUTRIN XL) 150 MG TB24 tablet Take 1 tablet (150 mg total) by mouth once daily. 90 tablet 1    carvediloL (COREG) 25 MG tablet Take 1 tablet (25 mg total) by mouth 2 (two) times daily. 180 tablet 1    ferrous sulfate (FEOSOL) 325 mg (65 mg iron) Tab tablet Take 1 tablet (325 mg total) by mouth once daily. 90 tablet 1    hydrOXYzine HCL (ATARAX) 25 MG tablet Take 1 tablet (25 mg total) by mouth every evening. 90 tablet 1    levothyroxine (SYNTHROID) 50 MCG tablet TAKE ONE TABLET BY MOUTH EVERY DAY BEFORE BREAKFAST 90 tablet 0    loratadine (CLARITIN) 10 mg tablet TAKE 1 TABLET EVERY DAY 90 tablet 1    potassium chloride (MICRO-K) 10 MEQ CpSR Take 1 capsule (10 mEq total) by mouth once daily. 90 capsule 1    spironolactone (ALDACTONE) 25 MG tablet Take 1 tablet (25 mg total) by mouth once daily. 90 tablet 1    topiramate (TOPAMAX) 50 MG tablet Take 1 tablet (50 mg total) by mouth 2 (two) times daily. 180 tablet 1    vitamin D (VITAMIN D3) 1000 units Tab Take 1 tablet (1,000 Units total) by mouth once daily. 90 tablet 1       Allergies  Review of patient's allergies indicates:   Allergen Reactions    Codeine Swelling    Codeine phosphate Other (See Comments)    Tetanus and diphtheria toxoids  "Itching    Tetanus immune globulin Other (See Comments)       Physical Examination     Vitals:    07/19/22 1010   BP: 124/60   BP Location: Left arm   Patient Position: Sitting   Pulse: 70   Resp: 18   Temp: 98.1 °F (36.7 °C)   TempSrc: Oral   SpO2: 98%   Weight: 71.1 kg (156 lb 12.8 oz)   Height: 5' 4" (1.626 m)      Physical Exam  Constitutional:       Appearance: Normal appearance.   HENT:      Head: Normocephalic.      Right Ear: Tympanic membrane, ear canal and external ear normal.      Left Ear: Tympanic membrane, ear canal and external ear normal.      Nose: Nose normal.      Mouth/Throat:      Mouth: Mucous membranes are moist.      Pharynx: Oropharynx is clear.   Eyes:      Extraocular Movements: Extraocular movements intact.      Conjunctiva/sclera: Conjunctivae normal.      Pupils: Pupils are equal, round, and reactive to light.   Cardiovascular:      Rate and Rhythm: Normal rate and regular rhythm.      Pulses: Normal pulses.      Heart sounds: Normal heart sounds.   Pulmonary:      Effort: Pulmonary effort is normal.      Breath sounds: Normal breath sounds.   Abdominal:      General: Bowel sounds are normal.      Palpations: Abdomen is soft.   Musculoskeletal:         General: Normal range of motion.      Cervical back: Normal range of motion.   Skin:     General: Skin is warm and dry.      Capillary Refill: Capillary refill takes less than 2 seconds.   Neurological:      General: No focal deficit present.      Mental Status: She is alert and oriented to person, place, and time.   Psychiatric:         Mood and Affect: Mood normal.         Behavior: Behavior normal.          Assessment and Plan (including Health Maintenance)      Problem List  Smart Sets  Document Outside HM   :    Plan: cont all meds as ordered, return to clinic as scheudled. Return to [facility] as scheduled and needed    Osteopenia of multiple sites    Depression, unspecified depression type    Fibromyalgia    Essential " hypertension    Hypothyroidism, unspecified type    Other orders  -     calcium-vitamin D (CALCIUM WITH VITAMIN D) 600 mg-10 mcg (400 unit) Tab; Take 1 tablet by mouth once. for 1 dose  Dispense: 90 tablet; Refill: 1            Health Maintenance Due   Topic Date Due    Mammogram  09/09/2022       Problem List Items Addressed This Visit        Psychiatric    Depression       Cardiac/Vascular    Essential hypertension       Endocrine    Hypothyroidism       Orthopedic    Fibromyalgia    Osteopenia - Primary            Health Maintenance Topics with due status: Not Due       Topic Last Completion Date    Colorectal Cancer Screening 03/26/2013    Influenza Vaccine 10/08/2021    Lipid Panel 04/18/2022    DEXA Scan 07/15/2022       Procedures     Future Appointments   Date Time Provider Department Center   8/9/2022  9:00 AM ADRIANNA Figueroa [facility] CARD [facility]   9/14/2022  1:45 PM Jose Solorzano MD [facility] SPINE [facility]   1/19/2023  8:00 AM Imani Caldwell NP [facility] [place]   6/14/2023  8:30 AM AW[facility] [place]        Follow up in about 6 months (around 1/19/2023).       Signature:  Imani Caldwell NP    Date of encounter: 7/19/22

## 2022-07-19 NOTE — PATIENT INSTRUCTIONS
Take calcium, vit d, exercise and healhty diet. , yogurt, low fat mild, cheese, green veg, suchas as broccoli, muriel greens, salmon, tofu

## 2022-08-09 ENCOUNTER — OFFICE VISIT (OUTPATIENT)
Dept: CARDIOLOGY | Facility: CLINIC | Age: 62
End: 2022-08-09
Payer: MEDICARE

## 2022-08-09 VITALS
BODY MASS INDEX: 26.98 KG/M2 | HEART RATE: 64 BPM | SYSTOLIC BLOOD PRESSURE: 132 MMHG | OXYGEN SATURATION: 99 % | WEIGHT: 158 LBS | HEIGHT: 64 IN | DIASTOLIC BLOOD PRESSURE: 80 MMHG

## 2022-08-09 DIAGNOSIS — I25.10 CORONARY ARTERY DISEASE, UNSPECIFIED VESSEL OR LESION TYPE, UNSPECIFIED WHETHER ANGINA PRESENT, UNSPECIFIED WHETHER NATIVE OR TRANSPLANTED HEART: Primary | ICD-10-CM

## 2022-08-09 PROCEDURE — 1159F PR MEDICATION LIST DOCUMENTED IN MEDICAL RECORD: ICD-10-PCS | Mod: CPTII,,, | Performed by: NURSE PRACTITIONER

## 2022-08-09 PROCEDURE — 4010F ACE/ARB THERAPY RXD/TAKEN: CPT | Mod: CPTII,,, | Performed by: NURSE PRACTITIONER

## 2022-08-09 PROCEDURE — 93010 ELECTROCARDIOGRAM REPORT: CPT | Mod: S$PBB,,, | Performed by: INTERNAL MEDICINE

## 2022-08-09 PROCEDURE — 1160F RVW MEDS BY RX/DR IN RCRD: CPT | Mod: CPTII,,, | Performed by: NURSE PRACTITIONER

## 2022-08-09 PROCEDURE — 93010 EKG 12-LEAD: ICD-10-PCS | Mod: S$PBB,,, | Performed by: INTERNAL MEDICINE

## 2022-08-09 PROCEDURE — 3075F SYST BP GE 130 - 139MM HG: CPT | Mod: CPTII,,, | Performed by: NURSE PRACTITIONER

## 2022-08-09 PROCEDURE — 3079F PR MOST RECENT DIASTOLIC BLOOD PRESSURE 80-89 MM HG: ICD-10-PCS | Mod: CPTII,,, | Performed by: NURSE PRACTITIONER

## 2022-08-09 PROCEDURE — 4010F PR ACE/ARB THEARPY RXD/TAKEN: ICD-10-PCS | Mod: CPTII,,, | Performed by: NURSE PRACTITIONER

## 2022-08-09 PROCEDURE — 99214 PR OFFICE/OUTPT VISIT, EST, LEVL IV, 30-39 MIN: ICD-10-PCS | Mod: S$PBB,,, | Performed by: NURSE PRACTITIONER

## 2022-08-09 PROCEDURE — 99214 OFFICE O/P EST MOD 30 MIN: CPT | Mod: S$PBB,,, | Performed by: NURSE PRACTITIONER

## 2022-08-09 PROCEDURE — 3008F BODY MASS INDEX DOCD: CPT | Mod: CPTII,,, | Performed by: NURSE PRACTITIONER

## 2022-08-09 PROCEDURE — 3079F DIAST BP 80-89 MM HG: CPT | Mod: CPTII,,, | Performed by: NURSE PRACTITIONER

## 2022-08-09 PROCEDURE — 93005 ELECTROCARDIOGRAM TRACING: CPT | Mod: PBBFAC | Performed by: INTERNAL MEDICINE

## 2022-08-09 PROCEDURE — 1160F PR REVIEW ALL MEDS BY PRESCRIBER/CLIN PHARMACIST DOCUMENTED: ICD-10-PCS | Mod: CPTII,,, | Performed by: NURSE PRACTITIONER

## 2022-08-09 PROCEDURE — 1159F MED LIST DOCD IN RCRD: CPT | Mod: CPTII,,, | Performed by: NURSE PRACTITIONER

## 2022-08-09 PROCEDURE — 99214 OFFICE O/P EST MOD 30 MIN: CPT | Mod: PBBFAC | Performed by: NURSE PRACTITIONER

## 2022-08-09 PROCEDURE — 3008F PR BODY MASS INDEX (BMI) DOCUMENTED: ICD-10-PCS | Mod: CPTII,,, | Performed by: NURSE PRACTITIONER

## 2022-08-09 PROCEDURE — 3075F PR MOST RECENT SYSTOLIC BLOOD PRESS GE 130-139MM HG: ICD-10-PCS | Mod: CPTII,,, | Performed by: NURSE PRACTITIONER

## 2022-08-09 RX ORDER — DIPHENHYDRAMINE HCL 25 MG
25 TABLET ORAL NIGHTLY PRN
COMMUNITY

## 2022-08-09 RX ORDER — DOCUSATE SODIUM 100 MG/1
100 CAPSULE, LIQUID FILLED ORAL 2 TIMES DAILY
COMMUNITY

## 2022-08-09 RX ORDER — ATORVASTATIN CALCIUM 80 MG/1
80 TABLET, FILM COATED ORAL NIGHTLY
COMMUNITY
Start: 2022-07-06 | End: 2022-09-26 | Stop reason: SDUPTHER

## 2022-08-09 NOTE — PROGRESS NOTES
[facility] note        DATE OF SERVICE: 08/09/2022       PCP: Imani Caldwell NP      CHIEF COMPLAINT:   Chief Complaint   Patient presents with    Shortness of Breath     With walking        HISTORY OF PRESENT ILLNESS:  Becki Mejia is a 62 y.o. female with a PMH of   Past Medical History:   Diagnosis Date    Depression     Fibromyalgia      who presents for routine follow up.   Chief Complaint   Patient presents with    Shortness of Breath     With walking            PAST MEDICAL HISTORY:  Past Medical History:   Diagnosis Date    Depression     Fibromyalgia        PAST SURGICAL HISTORY:  Past Surgical History:   Procedure Laterality Date    APPENDECTOMY      CARDIAC SURGERY      CORONARY ARTERY BYPASS GRAFT      HYSTERECTOMY      TOTAL    SPINE SURGERY      TONSILLECTOMY         SOCIAL HISTORY:  Social History     Socioeconomic History    Marital status:      Spouse name: Harper    Number of children: 3   Occupational History     Comment: DISABILITY   Tobacco Use    Smoking status: Former Smoker     Packs/day: 1.00     Types: Cigarettes     Start date: 1988     Quit date: 2007     Years since quitting: 15.6    Smokeless tobacco: Never Used    Tobacco comment: smoked off and on   Substance and Sexual Activity    Alcohol use: Never    Drug use: Never    Sexual activity: Not Currently     Partners: Male       FAMILY HISTORY:  Family History   Problem Relation Age of Onset    Cancer Mother     Diabetes Mother     Hypertension Mother     Parkinsonism Father     Diabetes Father     Hypertension Father     Colon cancer Sister     Hypoglycemic Sister     Diabetes Paternal Grandmother          ALLERGIES:  Review of patient's allergies indicates:   Allergen Reactions    Codeine Swelling    Codeine phosphate Other (See Comments)    Tetanus and diphtheria toxoids Itching    Tetanus immune globulin Other (See Comments)        MEDICATIONS:    Current Outpatient  Medications:     aspirin (ECOTRIN) 81 MG EC tablet, Take 81 mg by mouth once daily., Disp: , Rfl:     atorvastatin (LIPITOR) 80 MG tablet, Take 80 mg by mouth every evening., Disp: , Rfl:     benazepriL (LOTENSIN) 40 MG tablet, Take 1 tablet (40 mg total) by mouth nightly., Disp: 90 tablet, Rfl: 1    buPROPion (WELLBUTRIN XL) 150 MG TB24 tablet, Take 1 tablet (150 mg total) by mouth once daily., Disp: 90 tablet, Rfl: 1    calcium-vitamin D (CALCIUM WITH VITAMIN D) 600 mg-10 mcg (400 unit) Tab, Take 1 tablet by mouth once. for 1 dose, Disp: 90 tablet, Rfl: 1    carvediloL (COREG) 25 MG tablet, Take 1 tablet (25 mg total) by mouth 2 (two) times daily., Disp: 180 tablet, Rfl: 1    diphenhydrAMINE (BENADRYL ALLERGY) 25 mg tablet, Take 25 mg by mouth nightly as needed for Insomnia., Disp: , Rfl:     docusate sodium (COLACE) 100 MG capsule, Take 100 mg by mouth 2 (two) times daily., Disp: , Rfl:     ferrous sulfate (FEOSOL) 325 mg (65 mg iron) Tab tablet, Take 1 tablet (325 mg total) by mouth once daily., Disp: 90 tablet, Rfl: 1    hydrOXYzine HCL (ATARAX) 25 MG tablet, Take 1 tablet (25 mg total) by mouth every evening., Disp: 90 tablet, Rfl: 1    levothyroxine (SYNTHROID) 50 MCG tablet, TAKE ONE TABLET BY MOUTH EVERY DAY BEFORE BREAKFAST, Disp: 90 tablet, Rfl: 0    loratadine (CLARITIN) 10 mg tablet, TAKE 1 TABLET EVERY DAY, Disp: 90 tablet, Rfl: 1    potassium chloride (MICRO-K) 10 MEQ CpSR, Take 1 capsule (10 mEq total) by mouth once daily., Disp: 90 capsule, Rfl: 1    spironolactone (ALDACTONE) 25 MG tablet, Take 1 tablet (25 mg total) by mouth once daily., Disp: 90 tablet, Rfl: 1    topiramate (TOPAMAX) 50 MG tablet, Take 1 tablet (50 mg total) by mouth 2 (two) times daily., Disp: 180 tablet, Rfl: 1    vitamin D (VITAMIN D3) 1000 units Tab, Take 1 tablet (1,000 Units total) by mouth once daily., Disp: 90 tablet, Rfl: 1    semaglutide, weight loss, (WEGOVY) 0.5 mg/0.5 mL PnIj, 0.5 mg, Disp: , Rfl:  "  Medications have been reviewed and reconciled.     PHYSICAL EXAM:  /80 (BP Location: Left arm, Patient Position: Sitting)   Pulse 64   Ht 5' 4" (1.626 m)   Wt 71.7 kg (158 lb)   SpO2 99%   BMI 27.12 kg/m²   Wt Readings from Last 3 Encounters:   08/09/22 71.7 kg (158 lb)   07/19/22 71.1 kg (156 lb 12.8 oz)   06/13/22 69 kg (152 lb 3.2 oz)      Body mass index is 27.12 kg/m².    Physical Exam  Vitals and nursing note reviewed.   Constitutional:       Appearance: Normal appearance. She is normal weight.   HENT:      Head: Normocephalic and atraumatic.   Eyes:      Pupils: Pupils are equal, round, and reactive to light.   Neck:      Vascular: No carotid bruit.   Cardiovascular:      Rate and Rhythm: Normal rate and regular rhythm.      Pulses: Normal pulses.      Heart sounds: Normal heart sounds.   Pulmonary:      Effort: Pulmonary effort is normal.      Breath sounds: Normal breath sounds.   Abdominal:      General: Bowel sounds are normal.      Palpations: Abdomen is soft.   Musculoskeletal:      Cervical back: Neck supple.      Right lower leg: No edema.      Left lower leg: No edema.   Skin:     General: Skin is warm and dry.      Capillary Refill: Capillary refill takes less than 2 seconds.   Neurological:      General: No focal deficit present.      Mental Status: She is alert and oriented to person, place, and time.   Psychiatric:         Mood and Affect: Mood normal.         Behavior: Behavior normal.         LABS REVIEWED:  Lab Results   Component Value Date    WBC 3.57 (L) 04/18/2022    RBC 3.77 (L) 04/18/2022    HGB 10.9 (L) 04/18/2022    HCT 34.8 (L) 04/18/2022    MCV 92.3 04/18/2022    MCH 28.9 04/18/2022    MCHC 31.3 (L) 04/18/2022    RDW 12.9 04/18/2022     (L) 04/18/2022    MPV 10.8 04/18/2022    NRBC 0.0 04/18/2022     Lab Results   Component Value Date     04/18/2022    K 4.4 04/18/2022     04/18/2022    CO2 25 04/18/2022    BUN 13 04/18/2022     Lab Results   Component " Value Date     06/13/2022    AST 32 04/18/2022    ALT 22 04/18/2022     Lab Results   Component Value Date    GLU 89 04/18/2022     Lab Results   Component Value Date    CHOL 128 04/18/2022    HDL 50 04/18/2022    TRIG 59 04/18/2022    CHOLHDL 2.6 04/18/2022       CARDIAC STUDIES REVIEWED:EKG: RSR with HR 65 bpm; no significant change from EKG done 9/23/2019        ASSESSMENT:   Patient Active Problem List   Diagnosis    Fibromyalgia    Essential hypertension    Encounter for screening mammogram for malignant neoplasm of breast    Depression    Heart disease    Anxiety    Osteopenia    Hypothyroidism    Umbilical hernia without obstruction and without gangrene    Ventral hernia without obstruction or gangrene    Mass of adrenal gland    High risk medication use    Coronary artery disease involving native coronary artery of native heart    History of rheumatic fever    Back pain    Chest discomfort    Lumbar pain    Dorsalgia, unspecified    Mid back pain    Encounter for subsequent annual wellness visit (AWV) in Medicare patient    History of total hysterectomy with bilateral salpingo-oophorectomy (BSO)    BMI 26.0-26.9,adult    Other long term (current) drug therapy    Abnormal blood finding    Post-menopausal            Problem List Items Addressed This Visit    None     Visit Diagnoses     Coronary artery disease, unspecified vessel or lesion type, unspecified whether angina present, unspecified whether native or transplanted heart    -  Primary    Relevant Orders    EKG 12-lead           PLAN:  Orders Placed This Encounter   Procedures    EKG 12-lead     Standing Status:   Future     Number of Occurrences:   1     Standing Expiration Date:   8/9/2023      RTC: 6 months

## 2022-09-12 PROBLEM — Z00.00 ENCOUNTER FOR SUBSEQUENT ANNUAL WELLNESS VISIT (AWV) IN MEDICARE PATIENT: Status: RESOLVED | Noted: 2022-06-13 | Resolved: 2022-09-12

## 2022-09-13 DIAGNOSIS — F32.A DEPRESSION, UNSPECIFIED DEPRESSION TYPE: ICD-10-CM

## 2022-09-13 DIAGNOSIS — I10 ESSENTIAL HYPERTENSION: ICD-10-CM

## 2022-09-13 RX ORDER — BENAZEPRIL HYDROCHLORIDE 40 MG/1
40 TABLET ORAL NIGHTLY
Qty: 90 TABLET | Refills: 1 | Status: SHIPPED | OUTPATIENT
Start: 2022-09-13 | End: 2023-01-19 | Stop reason: SDUPTHER

## 2022-09-13 RX ORDER — BUPROPION HYDROCHLORIDE 150 MG/1
150 TABLET ORAL DAILY
Qty: 90 TABLET | Refills: 1 | Status: SHIPPED | OUTPATIENT
Start: 2022-09-13 | End: 2023-01-19 | Stop reason: SDUPTHER

## 2022-09-13 RX ORDER — TOPIRAMATE 50 MG/1
50 TABLET, FILM COATED ORAL 2 TIMES DAILY
Qty: 180 TABLET | Refills: 1 | Status: SHIPPED | OUTPATIENT
Start: 2022-09-13 | End: 2023-01-19 | Stop reason: SDUPTHER

## 2022-09-13 RX ORDER — POTASSIUM CHLORIDE 750 MG/1
10 CAPSULE, EXTENDED RELEASE ORAL DAILY
Qty: 90 CAPSULE | Refills: 1 | Status: SHIPPED | OUTPATIENT
Start: 2022-09-13 | End: 2023-01-19 | Stop reason: SDUPTHER

## 2022-09-26 DIAGNOSIS — M85.80 OSTEOPENIA, UNSPECIFIED LOCATION: ICD-10-CM

## 2022-09-26 DIAGNOSIS — I10 ESSENTIAL HYPERTENSION: ICD-10-CM

## 2022-09-26 RX ORDER — ATORVASTATIN CALCIUM 80 MG/1
80 TABLET, FILM COATED ORAL NIGHTLY
Qty: 90 TABLET | Refills: 1 | Status: SHIPPED | OUTPATIENT
Start: 2022-09-26 | End: 2022-11-03

## 2022-09-26 RX ORDER — SPIRONOLACTONE 25 MG/1
25 TABLET ORAL DAILY
Qty: 90 TABLET | Refills: 1 | Status: SHIPPED | OUTPATIENT
Start: 2022-09-26 | End: 2023-01-19 | Stop reason: SDUPTHER

## 2022-09-26 RX ORDER — CHOLECALCIFEROL (VITAMIN D3) 25 MCG
1000 TABLET ORAL DAILY
Qty: 90 TABLET | Refills: 1 | Status: SHIPPED | OUTPATIENT
Start: 2022-09-26 | End: 2023-01-19 | Stop reason: SDUPTHER

## 2022-10-03 ENCOUNTER — CLINICAL SUPPORT (OUTPATIENT)
Dept: FAMILY MEDICINE | Facility: CLINIC | Age: 62
End: 2022-10-03
Payer: MEDICARE

## 2022-10-03 DIAGNOSIS — Z23 FLU VACCINE NEED: Primary | ICD-10-CM

## 2022-10-03 PROCEDURE — 90686 FLU VACCINE (QUAD) GREATER THAN OR EQUAL TO 3YO PRESERVATIVE FREE IM: ICD-10-PCS | Mod: ,,, | Performed by: NURSE PRACTITIONER

## 2022-10-03 PROCEDURE — G0008 FLU VACCINE (QUAD) GREATER THAN OR EQUAL TO 3YO PRESERVATIVE FREE IM: ICD-10-PCS | Mod: ,,, | Performed by: NURSE PRACTITIONER

## 2022-10-03 PROCEDURE — G0008 ADMIN INFLUENZA VIRUS VAC: HCPCS | Mod: ,,, | Performed by: NURSE PRACTITIONER

## 2022-10-03 PROCEDURE — 90686 IIV4 VACC NO PRSV 0.5 ML IM: CPT | Mod: ,,, | Performed by: NURSE PRACTITIONER

## 2022-11-09 DIAGNOSIS — Z71.89 COMPLEX CARE COORDINATION: ICD-10-CM

## 2022-12-12 ENCOUNTER — OFFICE VISIT (OUTPATIENT)
Dept: FAMILY MEDICINE | Facility: CLINIC | Age: 62
End: 2022-12-12
Payer: MEDICARE

## 2022-12-12 VITALS
HEIGHT: 64 IN | HEART RATE: 71 BPM | RESPIRATION RATE: 20 BRPM | DIASTOLIC BLOOD PRESSURE: 80 MMHG | SYSTOLIC BLOOD PRESSURE: 148 MMHG | WEIGHT: 155 LBS | TEMPERATURE: 99 F | BODY MASS INDEX: 26.46 KG/M2 | OXYGEN SATURATION: 96 %

## 2022-12-12 DIAGNOSIS — J34.89 NASAL DRAINAGE: ICD-10-CM

## 2022-12-12 DIAGNOSIS — J01.00 ACUTE NON-RECURRENT MAXILLARY SINUSITIS: Primary | ICD-10-CM

## 2022-12-12 DIAGNOSIS — R05.9 COUGH, UNSPECIFIED TYPE: ICD-10-CM

## 2022-12-12 LAB
CTP QC/QA: YES
CTP QC/QA: YES
FLUAV AG NPH QL: NEGATIVE
FLUBV AG NPH QL: NEGATIVE
SARS-COV-2 AG RESP QL IA.RAPID: NEGATIVE

## 2022-12-12 PROCEDURE — 3079F DIAST BP 80-89 MM HG: CPT | Mod: ,,, | Performed by: NURSE PRACTITIONER

## 2022-12-12 PROCEDURE — 87804 INFLUENZA ASSAY W/OPTIC: CPT | Mod: RHCUB | Performed by: NURSE PRACTITIONER

## 2022-12-12 PROCEDURE — 96372 PR INJECTION,THERAP/PROPH/DIAG2ST, IM OR SUBCUT: ICD-10-PCS | Mod: ,,, | Performed by: NURSE PRACTITIONER

## 2022-12-12 PROCEDURE — 3008F PR BODY MASS INDEX (BMI) DOCUMENTED: ICD-10-PCS | Mod: ,,, | Performed by: NURSE PRACTITIONER

## 2022-12-12 PROCEDURE — 3008F BODY MASS INDEX DOCD: CPT | Mod: ,,, | Performed by: NURSE PRACTITIONER

## 2022-12-12 PROCEDURE — 3077F PR MOST RECENT SYSTOLIC BLOOD PRESSURE >= 140 MM HG: ICD-10-PCS | Mod: ,,, | Performed by: NURSE PRACTITIONER

## 2022-12-12 PROCEDURE — 96372 THER/PROPH/DIAG INJ SC/IM: CPT | Mod: ,,, | Performed by: NURSE PRACTITIONER

## 2022-12-12 PROCEDURE — 3077F SYST BP >= 140 MM HG: CPT | Mod: ,,, | Performed by: NURSE PRACTITIONER

## 2022-12-12 PROCEDURE — 87426 SARSCOV CORONAVIRUS AG IA: CPT | Mod: RHCUB | Performed by: NURSE PRACTITIONER

## 2022-12-12 PROCEDURE — 1159F MED LIST DOCD IN RCRD: CPT | Mod: ,,, | Performed by: NURSE PRACTITIONER

## 2022-12-12 PROCEDURE — 99214 OFFICE O/P EST MOD 30 MIN: CPT | Mod: 25,,, | Performed by: NURSE PRACTITIONER

## 2022-12-12 PROCEDURE — 3079F PR MOST RECENT DIASTOLIC BLOOD PRESSURE 80-89 MM HG: ICD-10-PCS | Mod: ,,, | Performed by: NURSE PRACTITIONER

## 2022-12-12 PROCEDURE — 1159F PR MEDICATION LIST DOCUMENTED IN MEDICAL RECORD: ICD-10-PCS | Mod: ,,, | Performed by: NURSE PRACTITIONER

## 2022-12-12 PROCEDURE — 99214 PR OFFICE/OUTPT VISIT, EST, LEVL IV, 30-39 MIN: ICD-10-PCS | Mod: 25,,, | Performed by: NURSE PRACTITIONER

## 2022-12-12 PROCEDURE — 4010F ACE/ARB THERAPY RXD/TAKEN: CPT | Mod: ,,, | Performed by: NURSE PRACTITIONER

## 2022-12-12 PROCEDURE — 4010F PR ACE/ARB THEARPY RXD/TAKEN: ICD-10-PCS | Mod: ,,, | Performed by: NURSE PRACTITIONER

## 2022-12-12 RX ORDER — CEFTRIAXONE 1 G/1
1 INJECTION, POWDER, FOR SOLUTION INTRAMUSCULAR; INTRAVENOUS
Status: COMPLETED | OUTPATIENT
Start: 2022-12-12 | End: 2022-12-12

## 2022-12-12 RX ORDER — FLUTICASONE PROPIONATE 50 MCG
1 SPRAY, SUSPENSION (ML) NASAL DAILY
Qty: 15.8 ML | Refills: 0 | Status: SHIPPED | OUTPATIENT
Start: 2022-12-12 | End: 2023-01-19 | Stop reason: ALTCHOICE

## 2022-12-12 RX ORDER — CHLORPHENIRAMINE MALEATE AND PHENYLEPHRINE HYDROCHLORIDE 4; 10 MG/1; MG/1
1 TABLET, COATED ORAL EVERY 6 HOURS PRN
Qty: 30 TABLET | Refills: 0 | Status: SHIPPED | OUTPATIENT
Start: 2022-12-12 | End: 2022-12-22

## 2022-12-12 RX ORDER — AMOXICILLIN 500 MG/1
500 CAPSULE ORAL EVERY 8 HOURS
Qty: 30 CAPSULE | Refills: 0 | Status: SHIPPED | OUTPATIENT
Start: 2022-12-12 | End: 2023-01-19 | Stop reason: ALTCHOICE

## 2022-12-12 RX ORDER — DEXAMETHASONE SODIUM PHOSPHATE 4 MG/ML
4 INJECTION, SOLUTION INTRA-ARTICULAR; INTRALESIONAL; INTRAMUSCULAR; INTRAVENOUS; SOFT TISSUE
Status: COMPLETED | OUTPATIENT
Start: 2022-12-12 | End: 2022-12-12

## 2022-12-12 RX ADMIN — CEFTRIAXONE 1 G: 1 INJECTION, POWDER, FOR SOLUTION INTRAMUSCULAR; INTRAVENOUS at 08:12

## 2022-12-12 RX ADMIN — DEXAMETHASONE SODIUM PHOSPHATE 4 MG: 4 INJECTION, SOLUTION INTRA-ARTICULAR; INTRALESIONAL; INTRAMUSCULAR; INTRAVENOUS; SOFT TISSUE at 08:12

## 2022-12-12 NOTE — PROGRESS NOTES
Imani Caldwell NP   [facility]  [address] [address]  [place]     PATIENT NAME: Becki Mejia  : 1960  DATE: 22  MRN: [id number]      Billing Provider: Imani Caldwell NP  Level of Service:   Patient PCP Information       Provider PCP Type    Imani Caldwell NP General            Reason for Visit / Chief Complaint: Cough (C/o symptoms x 4 days), Sore Throat, and Nasal Congestion       Update PCP  Update Chief Complaint         History of Present Illness / Problem Focused Workflow     Becki Mejia presents to the clinic cough, sore throat, and nasal congestion x 4 days      Review of Systems     Review of Systems   HENT:  Positive for nasal congestion and sore throat.    Respiratory:  Positive for cough.    All other systems reviewed and are negative.     Medical / Social / Family History     Past Medical History:   Diagnosis Date    Depression     Fibromyalgia        Past Surgical History:   Procedure Laterality Date    APPENDECTOMY      CARDIAC SURGERY      CORONARY ARTERY BYPASS GRAFT      HYSTERECTOMY      TOTAL    SPINE SURGERY      TONSILLECTOMY         Social History  Ms.  reports that she quit smoking about 15 years ago. Her smoking use included cigarettes. She started smoking about 34 years ago. She smoked an average of 1 pack per day. She has never used smokeless tobacco. She reports that she does not drink alcohol and does not use drugs.    Family History  Ms.'s family history includes Cancer in her mother; Colon cancer in her sister; Diabetes in her father, mother, and paternal grandmother; Hypertension in her father and mother; Hypoglycemic in her sister; Parkinsonism in her father.    Medications and Allergies     Medications  Outpatient Medications Marked as Taking for the 22 encounter (Office Visit) with Imani Caldwell NP   Medication Sig Dispense Refill    aspirin (ECOTRIN) 81 MG EC tablet Take 81 mg by mouth once daily.      atorvastatin (LIPITOR)  "80 MG tablet TAKE 1 TABLET EVERY DAY 90 tablet 1    benazepriL (LOTENSIN) 40 MG tablet Take 1 tablet (40 mg total) by mouth nightly. 90 tablet 1    buPROPion (WELLBUTRIN XL) 150 MG TB24 tablet Take 1 tablet (150 mg total) by mouth once daily. 90 tablet 1    carvediloL (COREG) 25 MG tablet TAKE 1 TABLET TWICE DAILY 180 tablet 1    diphenhydrAMINE (SOMINEX) 25 mg tablet Take 25 mg by mouth nightly as needed for Insomnia.      docusate sodium (COLACE) 100 MG capsule Take 100 mg by mouth 2 (two) times daily.      ferrous sulfate (FEOSOL) 325 mg (65 mg iron) Tab tablet Take 1 tablet (325 mg total) by mouth once daily. 90 tablet 1    hydrOXYzine HCL (ATARAX) 25 MG tablet Take 1 tablet (25 mg total) by mouth every evening. 90 tablet 1    levothyroxine (SYNTHROID) 50 MCG tablet TAKE ONE TABLET BY MOUTH EVERY DAY BEFORE BREAKFAST 90 tablet 0    loratadine (CLARITIN) 10 mg tablet TAKE 1 TABLET EVERY DAY 90 tablet 1    potassium chloride (MICRO-K) 10 MEQ CpSR Take 1 capsule (10 mEq total) by mouth once daily. 90 capsule 1    spironolactone (ALDACTONE) 25 MG tablet Take 1 tablet (25 mg total) by mouth once daily. 90 tablet 1    topiramate (TOPAMAX) 50 MG tablet Take 1 tablet (50 mg total) by mouth 2 (two) times daily. 180 tablet 1    vitamin D (VITAMIN D3) 1000 units Tab Take 1 tablet (1,000 Units total) by mouth once daily. 90 tablet 1       Allergies  Review of patient's allergies indicates:   Allergen Reactions    Codeine Swelling    Codeine phosphate Other (See Comments)    Tetanus and diphtheria toxoids Itching    Tetanus immune globulin Other (See Comments)       Physical Examination     Vitals:    12/12/22 0823   BP: (!) 148/80   BP Location: Left arm   Patient Position: Sitting   BP Method: Medium (Manual)   Pulse: 71   Resp: 20   Temp: 98.6 °F (37 °C)   TempSrc: Oral   SpO2: 96%   Weight: 70.3 kg (155 lb)   Height: 5' 4.02" (1.626 m)      Physical Exam  Vitals and nursing note reviewed. "   Constitutional:       Appearance: Normal appearance.   HENT:      Head: Normocephalic.      Right Ear: Tympanic membrane, ear canal and external ear normal.      Left Ear: Tympanic membrane, ear canal and external ear normal.      Nose: Nose normal.      Comments: Mod amt thick yellow nasal secretion, pressure over max region     Mouth/Throat:      Mouth: Mucous membranes are moist.      Pharynx: Oropharynx is clear.   Eyes:      Extraocular Movements: Extraocular movements intact.      Conjunctiva/sclera: Conjunctivae normal.      Pupils: Pupils are equal, round, and reactive to light.   Neck:      Comments: Mykel ant cervical nodes  Cardiovascular:      Rate and Rhythm: Normal rate and regular rhythm.      Pulses: Normal pulses.      Heart sounds: Normal heart sounds.   Pulmonary:      Effort: Pulmonary effort is normal.      Breath sounds: Normal breath sounds.   Abdominal:      General: Bowel sounds are normal.      Palpations: Abdomen is soft.   Musculoskeletal:         General: Normal range of motion.      Cervical back: Normal range of motion and neck supple.   Lymphadenopathy:      Cervical: Cervical adenopathy present.   Skin:     General: Skin is warm and dry.      Capillary Refill: Capillary refill takes less than 2 seconds.   Neurological:      General: No focal deficit present.      Mental Status: She is alert and oriented to person, place, and time.   Psychiatric:         Mood and Affect: Mood normal.         Behavior: Behavior normal.        Assessment and Plan (including Health Maintenance)      Problem List  Smart All At Home  Document Outside HM   :    Plan:     Cough, unspecified type  -     SARS Coronavirus 2 Antigen, POCT  -     POCT Influenza A/B    Nasal drainage  -     SARS Coronavirus 2 Antigen, POCT  -     POCT Influenza A/B            Health Maintenance Due   Topic Date Due    Mammogram  09/09/2022       Problem List Items Addressed This Visit    None  Visit Diagnoses       Cough, unspecified  type    -  Primary    Relevant Orders    SARS Coronavirus 2 Antigen, POCT    POCT Influenza A/B    Nasal drainage        Relevant Orders    SARS Coronavirus 2 Antigen, POCT    POCT Influenza A/B              Health Maintenance Topics with due status: Not Due       Topic Last Completion Date    Colorectal Cancer Screening 03/26/2013    Lipid Panel 04/18/2022    DEXA Scan 07/15/2022    Aspirin/Antiplatelet Therapy 08/09/2022       Procedures     Future Appointments   Date Time Provider Department Center   1/19/2023  8:00 AM [facility]   2/9/2023  9:30 AM ADRIANNA Figueroa OBC CARD [facility]   6/14/2023  8:30 AM JOSEPH NURSE [facility]        No follow-ups on file.       Signature:  Imani Caldwell NP    Date of encounter: 12/12/22

## 2023-01-19 ENCOUNTER — OFFICE VISIT (OUTPATIENT)
Dept: FAMILY MEDICINE | Facility: CLINIC | Age: 63
End: 2023-01-19
Payer: MEDICARE

## 2023-01-19 VITALS
OXYGEN SATURATION: 95 % | BODY MASS INDEX: 28.04 KG/M2 | SYSTOLIC BLOOD PRESSURE: 138 MMHG | RESPIRATION RATE: 20 BRPM | HEART RATE: 70 BPM | DIASTOLIC BLOOD PRESSURE: 60 MMHG | TEMPERATURE: 98 F | WEIGHT: 164.25 LBS | HEIGHT: 64 IN

## 2023-01-19 DIAGNOSIS — M85.80 OSTEOPENIA, UNSPECIFIED LOCATION: ICD-10-CM

## 2023-01-19 DIAGNOSIS — Z12.31 ENCOUNTER FOR SCREENING MAMMOGRAM FOR MALIGNANT NEOPLASM OF BREAST: ICD-10-CM

## 2023-01-19 DIAGNOSIS — E03.9 HYPOTHYROIDISM, UNSPECIFIED TYPE: ICD-10-CM

## 2023-01-19 DIAGNOSIS — F32.A DEPRESSION, UNSPECIFIED DEPRESSION TYPE: ICD-10-CM

## 2023-01-19 DIAGNOSIS — Z12.11 SCREENING FOR COLON CANCER: ICD-10-CM

## 2023-01-19 DIAGNOSIS — M10.9 ACUTE GOUT OF LEFT HAND, UNSPECIFIED CAUSE: ICD-10-CM

## 2023-01-19 DIAGNOSIS — I10 ESSENTIAL HYPERTENSION: Primary | ICD-10-CM

## 2023-01-19 PROCEDURE — 1159F PR MEDICATION LIST DOCUMENTED IN MEDICAL RECORD: ICD-10-PCS | Mod: ,,, | Performed by: NURSE PRACTITIONER

## 2023-01-19 PROCEDURE — 99214 OFFICE O/P EST MOD 30 MIN: CPT | Mod: ,,, | Performed by: NURSE PRACTITIONER

## 2023-01-19 PROCEDURE — 3075F PR MOST RECENT SYSTOLIC BLOOD PRESS GE 130-139MM HG: ICD-10-PCS | Mod: ,,, | Performed by: NURSE PRACTITIONER

## 2023-01-19 PROCEDURE — 3075F SYST BP GE 130 - 139MM HG: CPT | Mod: ,,, | Performed by: NURSE PRACTITIONER

## 2023-01-19 PROCEDURE — 3078F DIAST BP <80 MM HG: CPT | Mod: ,,, | Performed by: NURSE PRACTITIONER

## 2023-01-19 PROCEDURE — 4010F ACE/ARB THERAPY RXD/TAKEN: CPT | Mod: ,,, | Performed by: NURSE PRACTITIONER

## 2023-01-19 PROCEDURE — 99214 PR OFFICE/OUTPT VISIT, EST, LEVL IV, 30-39 MIN: ICD-10-PCS | Mod: ,,, | Performed by: NURSE PRACTITIONER

## 2023-01-19 PROCEDURE — 3078F PR MOST RECENT DIASTOLIC BLOOD PRESSURE < 80 MM HG: ICD-10-PCS | Mod: ,,, | Performed by: NURSE PRACTITIONER

## 2023-01-19 PROCEDURE — 3008F BODY MASS INDEX DOCD: CPT | Mod: ,,, | Performed by: NURSE PRACTITIONER

## 2023-01-19 PROCEDURE — 1159F MED LIST DOCD IN RCRD: CPT | Mod: ,,, | Performed by: NURSE PRACTITIONER

## 2023-01-19 PROCEDURE — 4010F PR ACE/ARB THEARPY RXD/TAKEN: ICD-10-PCS | Mod: ,,, | Performed by: NURSE PRACTITIONER

## 2023-01-19 PROCEDURE — 3008F PR BODY MASS INDEX (BMI) DOCUMENTED: ICD-10-PCS | Mod: ,,, | Performed by: NURSE PRACTITIONER

## 2023-01-19 RX ORDER — BENAZEPRIL HYDROCHLORIDE 40 MG/1
40 TABLET ORAL NIGHTLY
Qty: 90 TABLET | Refills: 1 | Status: SHIPPED | OUTPATIENT
Start: 2023-01-19 | End: 2023-01-24

## 2023-01-19 RX ORDER — TOPIRAMATE 50 MG/1
50 TABLET, FILM COATED ORAL 2 TIMES DAILY
Qty: 180 TABLET | Refills: 1 | Status: SHIPPED | OUTPATIENT
Start: 2023-01-19 | End: 2023-01-24

## 2023-01-19 RX ORDER — CARVEDILOL 25 MG/1
25 TABLET ORAL 2 TIMES DAILY
Qty: 180 TABLET | Refills: 1 | Status: SHIPPED | OUTPATIENT
Start: 2023-01-19 | End: 2023-07-04 | Stop reason: SDUPTHER

## 2023-01-19 RX ORDER — POTASSIUM CHLORIDE 750 MG/1
10 CAPSULE, EXTENDED RELEASE ORAL DAILY
Qty: 90 CAPSULE | Refills: 1 | Status: SHIPPED | OUTPATIENT
Start: 2023-01-19 | End: 2023-01-24

## 2023-01-19 RX ORDER — CHOLECALCIFEROL (VITAMIN D3) 25 MCG
1000 TABLET ORAL DAILY
Qty: 90 TABLET | Refills: 1 | Status: SHIPPED | OUTPATIENT
Start: 2023-01-19 | End: 2024-01-18

## 2023-01-19 RX ORDER — LEVOTHYROXINE SODIUM 50 UG/1
50 TABLET ORAL DAILY
Qty: 90 TABLET | Refills: 0 | Status: SHIPPED | OUTPATIENT
Start: 2023-01-19 | End: 2023-04-10

## 2023-01-19 RX ORDER — BUPROPION HYDROCHLORIDE 150 MG/1
150 TABLET ORAL DAILY
Qty: 90 TABLET | Refills: 1 | Status: SHIPPED | OUTPATIENT
Start: 2023-01-19 | End: 2023-01-24

## 2023-01-19 RX ORDER — ALLOPURINOL 100 MG/1
100 TABLET ORAL DAILY
Qty: 90 TABLET | Refills: 1 | Status: SHIPPED | OUTPATIENT
Start: 2023-01-19 | End: 2023-04-11 | Stop reason: SDUPTHER

## 2023-01-19 RX ORDER — ATORVASTATIN CALCIUM 80 MG/1
80 TABLET, FILM COATED ORAL DAILY
Qty: 90 TABLET | Refills: 1 | Status: SHIPPED | OUTPATIENT
Start: 2023-01-19 | End: 2024-01-18 | Stop reason: SDUPTHER

## 2023-01-19 RX ORDER — SPIRONOLACTONE 25 MG/1
25 TABLET ORAL DAILY
Qty: 90 TABLET | Refills: 1 | Status: SHIPPED | OUTPATIENT
Start: 2023-01-19 | End: 2023-01-24

## 2023-01-19 NOTE — PROGRESS NOTES
Answers submitted by the patient for this visit:  Review of Systems Questionnaire (Submitted on 2023)  activity change: No  unexpected weight change: No  neck pain: Yes  hearing loss: Yes  rhinorrhea: No  trouble swallowing: No  eye discharge: No  visual disturbance: No  chest tightness: No  wheezing: No  chest pain: No  palpitations: No  blood in stool: No  constipation: No  vomiting: No  diarrhea: No  polydipsia: No  polyuria: No  difficulty urinating: No  hematuria: No  menstrual problem: No  dysuria: No  joint swelling: No  arthralgias: Yes  headaches: No  weakness: No  confusion: No  dysphoric mood: No     Imani Caldwell NP   [facility]  [address]  [place]     PATIENT NAME: Becki Mejia  : 1960  DATE: 23  MRN: [id number]      Billing Provider: Imani Caldwell NP  Level of Service:   Patient PCP Information       Provider PCP Type    Imani Caldwell NP General            Reason for Visit / Chief Complaint: Follow-up (6 month f/u need refills on medications. Labs results are ready for review), Hypertension, and Thyroid Problem       Update PCP  Update Chief Complaint         History of Present Illness / Problem Focused Workflow     Becki Mejia presents to the clinic here for evaluation of htn and hypothyroid, also c/o pain of left knee, stated that she fell 1 year ago, does not want xray or mri at this time, rates pain occ as 4, also c/o nodule on left middle finger that throbs occ. , stated that she is beginning to get to angry at times and almost has outburst. Discuess poss sm dose of ativan to have if needed, pt unable to void for uds, will return tomorrow for uds and rx      Review of Systems     Review of Systems   Constitutional:  Negative for activity change, chills, fatigue, fever and unexpected weight change.   HENT:  Positive for hearing loss. Negative for nasal congestion, ear pain, facial swelling, mouth dryness, mouth sores, postnasal drip, rhinorrhea,  sinus pressure/congestion, trouble swallowing and goiter.    Eyes:  Negative for discharge, itching and visual disturbance.   Respiratory:  Negative for cough, chest tightness, shortness of breath and wheezing.    Cardiovascular:  Negative for chest pain, palpitations and leg swelling.   Gastrointestinal:  Negative for abdominal pain, blood in stool, change in bowel habit, constipation, diarrhea, vomiting and change in bowel habit.   Endocrine: Negative for polydipsia and polyuria.   Genitourinary:  Negative for difficulty urinating, dysuria, enuresis, frequency, hematuria, menstrual problem and urgency.   Musculoskeletal:  Positive for arthralgias and neck pain. Negative for joint swelling.   Neurological:  Negative for dizziness, vertigo, syncope, weakness and headaches.   Psychiatric/Behavioral:  Negative for confusion, decreased concentration and dysphoric mood. The patient is nervous/anxious.       Medical / Social / Family History     Past Medical History:   Diagnosis Date    Depression     Fibromyalgia        Past Surgical History:   Procedure Laterality Date    APPENDECTOMY      CARDIAC SURGERY      CORONARY ARTERY BYPASS GRAFT      HYSTERECTOMY      TOTAL    SPINE SURGERY      TONSILLECTOMY         Social History  Ms.  reports that she quit smoking about 16 years ago. Her smoking use included cigarettes. She started smoking about 35 years ago. She smoked an average of 1 pack per day. She has never used smokeless tobacco. She reports that she does not drink alcohol and does not use drugs.    Family History  Ms.'s family history includes Cancer in her mother; Colon cancer in her sister; Diabetes in her father, mother, and paternal grandmother; Hypertension in her father and mother; Hypoglycemic in her sister; Parkinsonism in her father.    Medications and Allergies     Medications  Outpatient Medications Marked as Taking for the 1/19/23 encounter (Office Visit) with Imani Caldwell NP   Medication Sig  Dispense Refill    aspirin (ECOTRIN) 81 MG EC tablet Take 81 mg by mouth once daily.      diphenhydrAMINE (SOMINEX) 25 mg tablet Take 25 mg by mouth nightly as needed for Insomnia.      docusate sodium (COLACE) 100 MG capsule Take 100 mg by mouth 2 (two) times daily.      ferrous sulfate (FEOSOL) 325 mg (65 mg iron) Tab tablet Take 1 tablet (325 mg total) by mouth once daily. 90 tablet 1    hydrOXYzine HCL (ATARAX) 25 MG tablet Take 1 tablet (25 mg total) by mouth every evening. 90 tablet 1    loratadine (CLARITIN) 10 mg tablet TAKE 1 TABLET EVERY DAY 90 tablet 1    [DISCONTINUED] atorvastatin (LIPITOR) 80 MG tablet TAKE 1 TABLET EVERY DAY 90 tablet 1    [DISCONTINUED] benazepriL (LOTENSIN) 40 MG tablet Take 1 tablet (40 mg total) by mouth nightly. 90 tablet 1    [DISCONTINUED] buPROPion (WELLBUTRIN XL) 150 MG TB24 tablet Take 1 tablet (150 mg total) by mouth once daily. 90 tablet 1    [DISCONTINUED] carvediloL (COREG) 25 MG tablet TAKE 1 TABLET TWICE DAILY 180 tablet 1    [DISCONTINUED] levothyroxine (SYNTHROID) 50 MCG tablet TAKE ONE TABLET BY MOUTH EVERY DAY BEFORE BREAKFAST 90 tablet 0    [DISCONTINUED] potassium chloride (MICRO-K) 10 MEQ CpSR Take 1 capsule (10 mEq total) by mouth once daily. 90 capsule 1    [DISCONTINUED] spironolactone (ALDACTONE) 25 MG tablet Take 1 tablet (25 mg total) by mouth once daily. 90 tablet 1    [DISCONTINUED] topiramate (TOPAMAX) 50 MG tablet Take 1 tablet (50 mg total) by mouth 2 (two) times daily. 180 tablet 1    [DISCONTINUED] vitamin D (VITAMIN D3) 1000 units Tab Take 1 tablet (1,000 Units total) by mouth once daily. 90 tablet 1       Allergies  Review of patient's allergies indicates:   Allergen Reactions    Codeine Swelling    Codeine phosphate Other (See Comments)    Tetanus and diphtheria toxoids Itching    Tetanus immune globulin Other (See Comments)       Physical Examination     Vitals:    01/19/23 0824   BP: 138/60   BP Location: Left arm   Patient Position: Sitting  "  BP Method: Medium (Manual)   Pulse: 70   Resp: 20   Temp: 98.2 °F (36.8 °C)   TempSrc: Oral   SpO2: 95%   Weight: 74.5 kg (164 lb 4 oz)   Height: 5' 4.02" (1.626 m)      Physical Exam  Vitals and nursing note reviewed.   Constitutional:       Appearance: Normal appearance.   HENT:      Head: Normocephalic.      Right Ear: Tympanic membrane, ear canal and external ear normal.      Left Ear: Tympanic membrane, ear canal and external ear normal.      Nose: Nose normal.      Mouth/Throat:      Mouth: Mucous membranes are moist.      Pharynx: Oropharynx is clear.   Eyes:      Extraocular Movements: Extraocular movements intact.      Conjunctiva/sclera: Conjunctivae normal.      Pupils: Pupils are equal, round, and reactive to light.   Cardiovascular:      Rate and Rhythm: Normal rate and regular rhythm.      Pulses: Normal pulses.      Heart sounds: Normal heart sounds.   Pulmonary:      Effort: Pulmonary effort is normal.      Breath sounds: Normal breath sounds.   Abdominal:      General: Bowel sounds are normal.      Palpations: Abdomen is soft.   Musculoskeletal:         General: Normal range of motion.      Comments: Sm nodule of left middle finger, distal joint, also c/o pain of left knee . Full rom, nontender to palpate, rates apin carmelita 4-5 also.    Skin:     General: Skin is warm and dry.      Capillary Refill: Capillary refill takes less than 2 seconds.   Neurological:      General: No focal deficit present.      Mental Status: She is alert and oriented to person, place, and time.   Psychiatric:         Mood and Affect: Mood normal.         Behavior: Behavior normal.        Assessment and Plan (including Health Maintenance)      Problem List  Smart Sets  Document Outside HM   :    Plan: pt returned for uds and rx for outburst and anxiety,  and uds reviewed and consistent with findings, , fx written for ativan    Acute gout of left hand, unspecified cause    Essential hypertension  -     benazepriL (LOTENSIN) " 40 MG tablet; Take 1 tablet (40 mg total) by mouth nightly.  Dispense: 90 tablet; Refill: 1  -     carvediloL (COREG) 25 MG tablet; Take 1 tablet (25 mg total) by mouth 2 (two) times daily.  Dispense: 180 tablet; Refill: 1  -     potassium chloride (MICRO-K) 10 MEQ CpSR; Take 1 capsule (10 mEq total) by mouth once daily.  Dispense: 90 capsule; Refill: 1  -     spironolactone (ALDACTONE) 25 MG tablet; Take 1 tablet (25 mg total) by mouth once daily.  Dispense: 90 tablet; Refill: 1    Depression, unspecified depression type  -     buPROPion (WELLBUTRIN XL) 150 MG TB24 tablet; Take 1 tablet (150 mg total) by mouth once daily.  Dispense: 90 tablet; Refill: 1    Hypothyroidism, unspecified type  -     levothyroxine (SYNTHROID) 50 MCG tablet; Take 1 tablet (50 mcg total) by mouth once daily.  Dispense: 90 tablet; Refill: 0    Osteopenia, unspecified location  -     vitamin D (VITAMIN D3) 1000 units Tab; Take 1 tablet (1,000 Units total) by mouth once daily.  Dispense: 90 tablet; Refill: 1    Screening for colon cancer  -     Ambulatory referral/consult to Gastroenterology; Future; Expected date: 01/26/2023    Encounter for screening mammogram for malignant neoplasm of breast  -     Mammo Digital Screening Bilat; Future; Expected date: 01/19/2023    Other orders  -     atorvastatin (LIPITOR) 80 MG tablet; Take 1 tablet (80 mg total) by mouth once daily.  Dispense: 90 tablet; Refill: 1  -     topiramate (TOPAMAX) 50 MG tablet; Take 1 tablet (50 mg total) by mouth 2 (two) times daily.  Dispense: 180 tablet; Refill: 1  -     allopurinoL (ZYLOPRIM) 100 MG tablet; Take 1 tablet (100 mg total) by mouth once daily.  Dispense: 90 tablet; Refill: 1            Health Maintenance Due   Topic Date Due    Mammogram  09/09/2022    Colorectal Cancer Screening  03/26/2023       Problem List Items Addressed This Visit          Psychiatric    Depression    Relevant Medications    buPROPion (WELLBUTRIN XL) 150 MG TB24 tablet        Cardiac/Vascular    Essential hypertension    Relevant Medications    benazepriL (LOTENSIN) 40 MG tablet    carvediloL (COREG) 25 MG tablet    potassium chloride (MICRO-K) 10 MEQ CpSR    spironolactone (ALDACTONE) 25 MG tablet       Renal/    Encounter for screening mammogram for malignant neoplasm of breast    Relevant Orders    Mammo Digital Screening Bilat       Endocrine    Hypothyroidism    Relevant Medications    levothyroxine (SYNTHROID) 50 MCG tablet       Orthopedic    Osteopenia    Relevant Medications    vitamin D (VITAMIN D3) 1000 units Tab     Other Visit Diagnoses       Acute gout of left hand, unspecified cause    -  Primary    Screening for colon cancer        Relevant Orders    Ambulatory referral/consult to Gastroenterology              Health Maintenance Topics with due status: Not Due       Topic Last Completion Date    DEXA Scan 07/15/2022    Lipid Panel 01/17/2023    Aspirin/Antiplatelet Therapy 01/19/2023       Procedures     Future Appointments   Date Time Provider Department Center   2/9/2023  9:30 AM [facility]   6/14/2023  8:30 AM JOSEPH NURSECAROLEE [facility]   7/19/2023  8:00 AM Imani Caldwell NP [facility]        No follow-ups on file.       Signature:  Imani Caldwell NP    Date of encounter: 1/19/23

## 2023-01-20 ENCOUNTER — APPOINTMENT (OUTPATIENT)
Dept: LAB | Facility: CLINIC | Age: 63
End: 2023-01-20
Payer: MEDICARE

## 2023-01-20 DIAGNOSIS — Z79.899 HIGH RISK MEDICATION USE: Primary | ICD-10-CM

## 2023-01-20 DIAGNOSIS — Z12.11 COLON CANCER SCREENING: Primary | ICD-10-CM

## 2023-01-20 LAB
CTP QC/QA: YES
POC (AMP) AMPHETAMINE: NEGATIVE
POC (BAR) BARBITURATES: NEGATIVE
POC (BUP) BUPRENORPHINE: NEGATIVE
POC (BZO) BENZODIAZEPINES: NEGATIVE
POC (COC) COCAINE: NEGATIVE
POC (MDMA) METHYLENEDIOXYMETHAMPHETAMINE 3,4: NEGATIVE
POC (MET) METHAMPHETAMINE: NEGATIVE
POC (MOP) OPIATES: NEGATIVE
POC (MTD) METHADONE: NEGATIVE
POC (OXY) OXYCODONE: NEGATIVE
POC (PCP) PHENCYCLIDINE: NEGATIVE
POC (TCA) TRICYCLIC ANTIDEPRESSANTS: NORMAL
POC TEMPERATURE (URINE): 92
POC THC: NEGATIVE

## 2023-01-20 PROCEDURE — 80305 DRUG TEST PRSMV DIR OPT OBS: CPT | Mod: RHCUB | Performed by: NURSE PRACTITIONER

## 2023-01-20 RX ORDER — LORAZEPAM 0.5 MG/1
0.5 TABLET ORAL EVERY 8 HOURS PRN
Qty: 21 TABLET | Refills: 0 | Status: SHIPPED | OUTPATIENT
Start: 2023-01-20 | End: 2024-01-18

## 2023-02-22 ENCOUNTER — HOSPITAL ENCOUNTER (OUTPATIENT)
Dept: RADIOLOGY | Facility: HOSPITAL | Age: 63
Discharge: HOME | End: 2023-02-22
Attending: NURSE PRACTITIONER
Payer: MEDICARE

## 2023-02-22 VITALS — WEIGHT: 155 LBS | HEIGHT: 64 IN | BODY MASS INDEX: 26.46 KG/M2

## 2023-02-22 DIAGNOSIS — Z12.31 ENCOUNTER FOR SCREENING MAMMOGRAM FOR MALIGNANT NEOPLASM OF BREAST: ICD-10-CM

## 2023-02-22 PROCEDURE — 77067 SCR MAMMO BI INCL CAD: CPT | Mod: TC

## 2023-03-13 ENCOUNTER — OFFICE VISIT (OUTPATIENT)
Dept: CARDIOLOGY | Facility: CLINIC | Age: 63
End: 2023-03-13
Payer: MEDICARE

## 2023-03-13 VITALS
SYSTOLIC BLOOD PRESSURE: 138 MMHG | DIASTOLIC BLOOD PRESSURE: 78 MMHG | BODY MASS INDEX: 28.2 KG/M2 | WEIGHT: 165.19 LBS | HEART RATE: 63 BPM | HEIGHT: 64 IN | OXYGEN SATURATION: 96 %

## 2023-03-13 DIAGNOSIS — E78.5 HYPERLIPIDEMIA, UNSPECIFIED HYPERLIPIDEMIA TYPE: ICD-10-CM

## 2023-03-13 DIAGNOSIS — I25.10 CORONARY ARTERY DISEASE, UNSPECIFIED VESSEL OR LESION TYPE, UNSPECIFIED WHETHER ANGINA PRESENT, UNSPECIFIED WHETHER NATIVE OR TRANSPLANTED HEART: Primary | ICD-10-CM

## 2023-03-13 DIAGNOSIS — I25.10 CORONARY ARTERY DISEASE INVOLVING NATIVE CORONARY ARTERY OF NATIVE HEART WITHOUT ANGINA PECTORIS: ICD-10-CM

## 2023-03-13 DIAGNOSIS — I10 HYPERTENSION, UNSPECIFIED TYPE: ICD-10-CM

## 2023-03-13 DIAGNOSIS — I10 ESSENTIAL HYPERTENSION: ICD-10-CM

## 2023-03-13 PROBLEM — J01.00 ACUTE NON-RECURRENT MAXILLARY SINUSITIS: Status: RESOLVED | Noted: 2022-12-12 | Resolved: 2023-03-13

## 2023-03-13 PROCEDURE — 4010F PR ACE/ARB THEARPY RXD/TAKEN: ICD-10-PCS | Mod: CPTII,,, | Performed by: NURSE PRACTITIONER

## 2023-03-13 PROCEDURE — 3075F SYST BP GE 130 - 139MM HG: CPT | Mod: CPTII,,, | Performed by: NURSE PRACTITIONER

## 2023-03-13 PROCEDURE — 3078F PR MOST RECENT DIASTOLIC BLOOD PRESSURE < 80 MM HG: ICD-10-PCS | Mod: CPTII,,, | Performed by: NURSE PRACTITIONER

## 2023-03-13 PROCEDURE — 99214 PR OFFICE/OUTPT VISIT, EST, LEVL IV, 30-39 MIN: ICD-10-PCS | Mod: S$PBB,,, | Performed by: NURSE PRACTITIONER

## 2023-03-13 PROCEDURE — 1159F PR MEDICATION LIST DOCUMENTED IN MEDICAL RECORD: ICD-10-PCS | Mod: CPTII,,, | Performed by: NURSE PRACTITIONER

## 2023-03-13 PROCEDURE — 1159F MED LIST DOCD IN RCRD: CPT | Mod: CPTII,,, | Performed by: NURSE PRACTITIONER

## 2023-03-13 PROCEDURE — 3075F PR MOST RECENT SYSTOLIC BLOOD PRESS GE 130-139MM HG: ICD-10-PCS | Mod: CPTII,,, | Performed by: NURSE PRACTITIONER

## 2023-03-13 PROCEDURE — 93005 ELECTROCARDIOGRAM TRACING: CPT | Mod: PBBFAC | Performed by: INTERNAL MEDICINE

## 2023-03-13 PROCEDURE — 3008F BODY MASS INDEX DOCD: CPT | Mod: CPTII,,, | Performed by: NURSE PRACTITIONER

## 2023-03-13 PROCEDURE — 3008F PR BODY MASS INDEX (BMI) DOCUMENTED: ICD-10-PCS | Mod: CPTII,,, | Performed by: NURSE PRACTITIONER

## 2023-03-13 PROCEDURE — 3078F DIAST BP <80 MM HG: CPT | Mod: CPTII,,, | Performed by: NURSE PRACTITIONER

## 2023-03-13 PROCEDURE — 4010F ACE/ARB THERAPY RXD/TAKEN: CPT | Mod: CPTII,,, | Performed by: NURSE PRACTITIONER

## 2023-03-13 PROCEDURE — 93010 EKG 12-LEAD: ICD-10-PCS | Mod: S$PBB,,, | Performed by: INTERNAL MEDICINE

## 2023-03-13 PROCEDURE — 99214 OFFICE O/P EST MOD 30 MIN: CPT | Mod: PBBFAC | Performed by: NURSE PRACTITIONER

## 2023-03-13 PROCEDURE — 99214 OFFICE O/P EST MOD 30 MIN: CPT | Mod: S$PBB,,, | Performed by: NURSE PRACTITIONER

## 2023-03-13 PROCEDURE — 93010 ELECTROCARDIOGRAM REPORT: CPT | Mod: S$PBB,,, | Performed by: INTERNAL MEDICINE

## 2023-03-13 RX ORDER — AMLODIPINE BESYLATE 5 MG/1
5 TABLET ORAL DAILY PRN
Qty: 30 TABLET | Refills: 11 | Status: SHIPPED | OUTPATIENT
Start: 2023-03-13 | End: 2024-01-18 | Stop reason: SDUPTHER

## 2023-03-13 NOTE — ASSESSMENT & PLAN NOTE
/78 mmHg  She states that some days her bp is elevated at home.   She has a very busy lifestyle with her grandsons.     She will take her bp 2 hours after her morning meds and if her bp is not 120/80 mmHg she will take amlodipine 5 mg po daily as needed.   BP log

## 2023-03-13 NOTE — PROGRESS NOTES
PCP: Imani Caldwell NP    Referring Provider:     Subjective:   Becki Mejia is a 62 y.o. female with hx of CAD s/p CABG 2017 for LM disease, HTN and HLD,  who presents for routine follow up. She does not exercise but remains physically active and asymptomtic.         Fhx:  Family History   Problem Relation Age of Onset    Cancer Mother     Diabetes Mother     Hypertension Mother     Parkinsonism Father     Diabetes Father     Hypertension Father     Colon cancer Sister     Hypoglycemic Sister     Diabetes Paternal Grandmother      Shx:   Social History     Socioeconomic History    Marital status:      Spouse name: Harper    Number of children: 3   Occupational History     Comment: DISABILITY   Tobacco Use    Smoking status: Former     Packs/day: 1.00     Types: Cigarettes     Start date:      Quit date:      Years since quittin.2    Smokeless tobacco: Never    Tobacco comments:     smoked off and on   Substance and Sexual Activity    Alcohol use: Never    Drug use: Never    Sexual activity: Not Currently     Partners: Male       EKG 3/13/2023 RSR with HR 67 bpm; minimal voltage criteria for LVH  2022 RSR with HR 65 bpm; minim al voltage criteria for LVH    ECHO 2019  Mild concentric Lvh with normal VL size and systolic funciton, EF 45%  Trace MR with normal LA Size  Aortic valve sclerosis without stenosis. Normal aortic root size  Mild TR with o/w normal right heart chambers and valves. RVSP 265 mmHG + RAP  Since the study of 2019 there is no significant change.    [facility] 10/29/2019  Severe single vessel CAD involving the prox LCX  Patent svg to the OM  Atrophic LIMA to the diagonal branch of the LAD  Normal LV size and systolic function, EF 55%  No significant MR  LVDD., LVEDP 18 mmHg        Lab Results   Component Value Date     2023    K 4.5 2023     2023    CO2 30 2023    BUN 22 (H) 2023    CREATININE 0.90 2023    CALCIUM  9.0 01/17/2023    ANIONGAP 9 01/17/2023    EGFRNONAA 64 04/18/2022       Lab Results   Component Value Date    CHOL 153 01/17/2023    CHOL 128 04/18/2022    CHOL 151 07/06/2021     Lab Results   Component Value Date    HDL 56 01/17/2023    HDL 50 04/18/2022    HDL 60 07/06/2021     Lab Results   Component Value Date    LDLCALC 80 01/17/2023    LDLCALC 66 04/18/2022    LDLCALC 69 07/06/2021     Lab Results   Component Value Date    TRIG 87 01/17/2023    TRIG 59 04/18/2022    TRIG 108 07/06/2021     Lab Results   Component Value Date    CHOLHDL 2.7 01/17/2023    CHOLHDL 2.6 04/18/2022    CHOLHDL 2.5 07/06/2021       Lab Results   Component Value Date    WBC 5.07 01/17/2023    HGB 11.8 (L) 01/17/2023    HCT 36.6 (L) 01/17/2023    MCV 92.2 01/17/2023     01/17/2023           Current Outpatient Medications:     allopurinoL (ZYLOPRIM) 100 MG tablet, Take 1 tablet (100 mg total) by mouth once daily., Disp: 90 tablet, Rfl: 1    aspirin (ECOTRIN) 81 MG EC tablet, Take 81 mg by mouth once daily., Disp: , Rfl:     atorvastatin (LIPITOR) 80 MG tablet, Take 1 tablet (80 mg total) by mouth once daily., Disp: 90 tablet, Rfl: 1    benazepriL (LOTENSIN) 40 MG tablet, TAKE 1 TABLET EVERY NIGHT, Disp: 90 tablet, Rfl: 1    buPROPion (WELLBUTRIN XL) 150 MG TB24 tablet, TAKE 1 TABLET ONE TIME DAILY, Disp: 90 tablet, Rfl: 1    calcium-vitamin D 600 mg-10 mcg (400 unit) Tab, TAKE 1 TABLET EVERY DAY, Disp: 90 tablet, Rfl: 1    carvediloL (COREG) 25 MG tablet, Take 1 tablet (25 mg total) by mouth 2 (two) times daily., Disp: 180 tablet, Rfl: 1    docusate sodium (COLACE) 100 MG capsule, Take 100 mg by mouth 2 (two) times daily., Disp: , Rfl:     FEROSUL 325 mg (65 mg iron) Tab tablet, TAKE 1 TABLET ONE TIME DAILY, Disp: 90 tablet, Rfl: 1    hydrOXYzine HCL (ATARAX) 25 MG tablet, TAKE 1 TABLET (25 MG TOTAL) BY MOUTH EVERY EVENING., Disp: 90 tablet, Rfl: 1    levothyroxine (SYNTHROID) 50 MCG tablet, Take 1 tablet (50 mcg total) by mouth  "once daily., Disp: 90 tablet, Rfl: 0    potassium chloride (MICRO-K) 10 MEQ CpSR, TAKE 1 CAPSULE ONE TIME DAILY, Disp: 90 capsule, Rfl: 1    spironolactone (ALDACTONE) 25 MG tablet, TAKE 1 TABLET ONE TIME DAILY, Disp: 90 tablet, Rfl: 1    topiramate (TOPAMAX) 50 MG tablet, TAKE 1 TABLET TWICE DAILY, Disp: 180 tablet, Rfl: 1    vitamin D (VITAMIN D3) 1000 units Tab, Take 1 tablet (1,000 Units total) by mouth once daily., Disp: 90 tablet, Rfl: 1    amLODIPine (NORVASC) 5 MG tablet, Take 1 tablet (5 mg total) by mouth daily as needed., Disp: 30 tablet, Rfl: 11    diphenhydrAMINE (SOMINEX) 25 mg tablet, Take 25 mg by mouth nightly as needed for Insomnia., Disp: , Rfl:     loratadine (CLARITIN) 10 mg tablet, TAKE 1 TABLET EVERY DAY (Patient not taking: Reported on 3/13/2023), Disp: 90 tablet, Rfl: 1    LORazepam (ATIVAN) 0.5 MG tablet, Take 1 tablet (0.5 mg total) by mouth every 8 (eight) hours as needed for Anxiety (anxiety)., Disp: 21 tablet, Rfl: 0  Meds reviewed and reconciled.      Review of Systems   Respiratory:  Negative for shortness of breath.    Cardiovascular:  Negative for chest pain, palpitations, orthopnea, claudication, leg swelling and PND.   Neurological:  Negative for dizziness, loss of consciousness and weakness.         Objective:   /78 (BP Location: Left arm, Patient Position: Sitting)   Pulse 63   Ht 5' 4" (1.626 m)   Wt 74.9 kg (165 lb 3.2 oz)   SpO2 96%   BMI 28.36 kg/m²     Physical Exam  Vitals and nursing note reviewed.   Constitutional:       Appearance: Normal appearance. She is normal weight.   HENT:      Head: Normocephalic and atraumatic.   Neck:      Vascular: No carotid bruit.   Cardiovascular:      Rate and Rhythm: Normal rate and regular rhythm.      Pulses: Normal pulses.      Heart sounds: Normal heart sounds.   Pulmonary:      Effort: Pulmonary effort is normal.      Breath sounds: Normal breath sounds.   Abdominal:      Palpations: Abdomen is soft.   Musculoskeletal:    "   Cervical back: Neck supple.      Right lower leg: No edema.      Left lower leg: No edema.   Skin:     General: Skin is warm and dry.      Capillary Refill: Capillary refill takes less than 2 seconds.   Neurological:      General: No focal deficit present.      Mental Status: She is alert.         Assessment:     1. Coronary artery disease, unspecified vessel or lesion type, unspecified whether angina present, unspecified whether native or transplanted heart  EKG 12-lead      2. Hypertension, unspecified type  amLODIPine (NORVASC) 5 MG tablet      3. Coronary artery disease involving native coronary artery of native heart without angina pectoris        4. Essential hypertension        5. Hyperlipidemia, unspecified hyperlipidemia type              Plan:   Coronary artery disease involving native coronary artery of native heart  asymptomatic    Essential hypertension  /78 mmHg  She states that some days her bp is elevated at home.   She has a very busy lifestyle with her grandsons.     She will take her bp 2 hours after her morning meds and if her bp is not 120/80 mmHg she will take amlodipine 5 mg po daily as needed.   BP log    Hyperlipidemia  Lipid panel results from 1/17/2023 reviewed.  Trig 87 mg/dl  LDL 80 mg/dl  Lipitor 80 mg po daily  Lipids followed by PCP    RTC: 6 months

## 2023-03-13 NOTE — ASSESSMENT & PLAN NOTE
Lipid panel results from 1/17/2023 reviewed.  Trig 87 mg/dl  LDL 80 mg/dl  Lipitor 80 mg po daily  Lipids followed by PCP

## 2023-03-24 ENCOUNTER — ANESTHESIA (OUTPATIENT)
Dept: GASTROENTEROLOGY | Facility: HOSPITAL | Age: 63
End: 2023-03-24
Payer: MEDICARE

## 2023-03-24 ENCOUNTER — HOSPITAL ENCOUNTER (OUTPATIENT)
Dept: GASTROENTEROLOGY | Facility: HOSPITAL | Age: 63
Discharge: HOME | End: 2023-03-24
Attending: INTERNAL MEDICINE
Payer: MEDICARE

## 2023-03-24 ENCOUNTER — ANESTHESIA EVENT (OUTPATIENT)
Dept: GASTROENTEROLOGY | Facility: HOSPITAL | Age: 63
End: 2023-03-24
Payer: MEDICARE

## 2023-03-24 VITALS
OXYGEN SATURATION: 98 % | RESPIRATION RATE: 16 BRPM | TEMPERATURE: 97 F | DIASTOLIC BLOOD PRESSURE: 49 MMHG | HEART RATE: 75 BPM | SYSTOLIC BLOOD PRESSURE: 100 MMHG

## 2023-03-24 DIAGNOSIS — Z12.11 COLON CANCER SCREENING: ICD-10-CM

## 2023-03-24 DIAGNOSIS — Z80.0 FAMILY HISTORY OF COLON CANCER: Primary | ICD-10-CM

## 2023-03-24 DIAGNOSIS — K62.1 POLYP OF RECTUM: ICD-10-CM

## 2023-03-24 DIAGNOSIS — K57.30 DIVERTICULOSIS OF COLON: ICD-10-CM

## 2023-03-24 PROCEDURE — 88305 SURGICAL PATHOLOGY: ICD-10-PCS | Mod: 26,,, | Performed by: PATHOLOGY

## 2023-03-24 PROCEDURE — D9220A PRA ANESTHESIA: Mod: PT,,, | Performed by: NURSE ANESTHETIST, CERTIFIED REGISTERED

## 2023-03-24 PROCEDURE — 88305 TISSUE EXAM BY PATHOLOGIST: CPT | Mod: 26,,, | Performed by: PATHOLOGY

## 2023-03-24 PROCEDURE — 27000284 HC CANNULA NASAL: Performed by: NURSE ANESTHETIST, CERTIFIED REGISTERED

## 2023-03-24 PROCEDURE — 63600175 PHARM REV CODE 636 W HCPCS: Performed by: NURSE ANESTHETIST, CERTIFIED REGISTERED

## 2023-03-24 PROCEDURE — 37000009 HC ANESTHESIA EA ADD 15 MINS

## 2023-03-24 PROCEDURE — 37000008 HC ANESTHESIA 1ST 15 MINUTES

## 2023-03-24 PROCEDURE — 45380 COLONOSCOPY AND BIOPSY: CPT | Mod: PT,,, | Performed by: INTERNAL MEDICINE

## 2023-03-24 PROCEDURE — 45380 PR COLONOSCOPY,BIOPSY: ICD-10-PCS | Mod: PT,,, | Performed by: INTERNAL MEDICINE

## 2023-03-24 PROCEDURE — 25000003 PHARM REV CODE 250: Performed by: NURSE ANESTHETIST, CERTIFIED REGISTERED

## 2023-03-24 PROCEDURE — 27000716 HC OXISENSOR PROBE, ANY SIZE: Performed by: NURSE ANESTHETIST, CERTIFIED REGISTERED

## 2023-03-24 PROCEDURE — D9220A PRA ANESTHESIA: ICD-10-PCS | Mod: PT,,, | Performed by: NURSE ANESTHETIST, CERTIFIED REGISTERED

## 2023-03-24 PROCEDURE — 88305 TISSUE EXAM BY PATHOLOGIST: CPT | Mod: TC,SUR | Performed by: INTERNAL MEDICINE

## 2023-03-24 PROCEDURE — 45380 COLONOSCOPY AND BIOPSY: CPT | Mod: PT | Performed by: INTERNAL MEDICINE

## 2023-03-24 PROCEDURE — 27201423 OPTIME MED/SURG SUP & DEVICES STERILE SUPPLY

## 2023-03-24 RX ORDER — LIDOCAINE HYDROCHLORIDE 20 MG/ML
INJECTION, SOLUTION EPIDURAL; INFILTRATION; INTRACAUDAL; PERINEURAL
Status: DISCONTINUED | OUTPATIENT
Start: 2023-03-24 | End: 2023-03-24

## 2023-03-24 RX ORDER — SODIUM CHLORIDE 0.9 % (FLUSH) 0.9 %
10 SYRINGE (ML) INJECTION
Status: DISCONTINUED | OUTPATIENT
Start: 2023-03-24 | End: 2023-03-25 | Stop reason: HOSPADM

## 2023-03-24 RX ORDER — PROPOFOL 10 MG/ML
VIAL (ML) INTRAVENOUS
Status: DISCONTINUED | OUTPATIENT
Start: 2023-03-24 | End: 2023-03-24

## 2023-03-24 RX ADMIN — LIDOCAINE HYDROCHLORIDE 100 MG: 20 INJECTION, SOLUTION INTRAVENOUS at 11:03

## 2023-03-24 RX ADMIN — SODIUM CHLORIDE: 9 INJECTION, SOLUTION INTRAVENOUS at 11:03

## 2023-03-24 RX ADMIN — PROPOFOL 100 MG: 10 INJECTION, EMULSION INTRAVENOUS at 12:03

## 2023-03-24 RX ADMIN — PROPOFOL 100 MG: 10 INJECTION, EMULSION INTRAVENOUS at 11:03

## 2023-03-24 RX ADMIN — PROPOFOL 50 MG: 10 INJECTION, EMULSION INTRAVENOUS at 12:03

## 2023-03-24 NOTE — H&P
[facility]  Gastroenterology  H&P    Patient Name: Becki Mejia  MRN: [id number]  Admission Date: 3/24/2023  Code Status: No Order    Attending Provider: Gage Rosales MD   Primary Care Physician: Imani Caldwell NP  Principal Problem:<principal problem not specified>    Subjective:     History of Present Illness: Pt had a colonoscopy  and FIT test 2018. She has a half-sister with colon cancer; now for colonoscopy.    Past Medical History:   Diagnosis Date    Depression     Fibromyalgia        Past Surgical History:   Procedure Laterality Date    APPENDECTOMY      CARDIAC SURGERY      CORONARY ARTERY BYPASS GRAFT      HYSTERECTOMY      TOTAL    OOPHORECTOMY      SPINE SURGERY      TONSILLECTOMY         Review of patient's allergies indicates:   Allergen Reactions    Codeine Swelling    Codeine phosphate Other (See Comments)    Tetanus and diphtheria toxoids Itching    Tetanus immune globulin Other (See Comments)     Family History       Problem Relation (Age of Onset)    Cancer Mother    Colon cancer Sister    Diabetes Mother, Father, Paternal Grandmother    Hypertension Mother, Father    Hypoglycemic Sister    Parkinsonism Father          Tobacco Use    Smoking status: Former     Packs/day: 1.00     Types: Cigarettes     Start date:      Quit date:      Years since quittin.2    Smokeless tobacco: Never    Tobacco comments:     smoked off and on   Substance and Sexual Activity    Alcohol use: Never    Drug use: Never    Sexual activity: Not Currently     Partners: Male     Review of Systems   Respiratory: Negative.     Cardiovascular: Negative.    Gastrointestinal: Negative.    Objective:     Vital Signs (Most Recent):  Pulse: 64 (23 1044)  Resp: 15 (23 1044)  BP: (!) 140/73 (23 1044)  SpO2: 96 % (23 1044)   Vital Signs (24h Range):  Pulse:  [64] 64  Resp:  [15] 15  SpO2:  [96 %] 96 %  BP: (140)/(73) 140/73        There is no height or weight on file to  calculate BMI.    No intake or output data in the 24 hours ending 03/24/23 1155    Lines/Drains/Airways       Peripheral Intravenous Line  Duration                  Peripheral IV - Single Lumen 03/24/23 1043 22 G Left;Posterior Hand <1 day                    Physical Exam  Vitals reviewed.   Constitutional:       General: She is not in acute distress.     Appearance: Normal appearance. She is well-developed. She is not ill-appearing.   HENT:      Head: Normocephalic and atraumatic.      Nose: Nose normal.   Eyes:      Pupils: Pupils are equal, round, and reactive to light.   Cardiovascular:      Rate and Rhythm: Normal rate and regular rhythm.   Pulmonary:      Effort: Pulmonary effort is normal.      Breath sounds: Normal breath sounds. No wheezing.   Abdominal:      General: Abdomen is flat. Bowel sounds are normal. There is no distension.      Palpations: Abdomen is soft.      Tenderness: There is no abdominal tenderness. There is no guarding.   Skin:     General: Skin is warm and dry.      Coloration: Skin is not jaundiced.   Neurological:      Mental Status: She is alert.   Psychiatric:         Attention and Perception: Attention normal.         Mood and Affect: Affect normal.         Speech: Speech normal.         Behavior: Behavior is cooperative.      Comments: Pt was calm while speaking.       Significant Labs:  CBC: No results for input(s): WBC, HGB, HCT, PLT in the last 48 hours.  CMP: No results for input(s): GLU, CALCIUM, ALBUMIN, PROT, NA, K, CO2, CL, BUN, CREATININE, ALKPHOS, ALT, AST, BILITOT in the last 48 hours.    Significant Imaging:  Imaging results within the past 24 hours have been reviewed.    Assessment/Plan:     There are no hospital problems to display for this patient.        Imp: family hx of colon cancer  Plan: colonoscopy    Gage Rosales MD  Gastroenterology  [facility]

## 2023-03-24 NOTE — ANESTHESIA PREPROCEDURE EVALUATION
03/24/2023  Becki Mejia is a 62 y.o., female.      Pre-op Assessment    I have reviewed the Patient Summary Reports.     I have reviewed the Nursing Notes. I have reviewed the NPO Status.   I have reviewed the Medications.     Review of Systems  Anesthesia Hx:  No problems with previous Anesthesia    Social:  Non-Smoker, No Alcohol Use    Cardiovascular:   Hypertension Valvular problems/Murmurs CAD   Aortic valve replacement 2019   Neurological:   Neuromuscular Disease,    Endocrine:   Hypothyroidism    Psych:   Psychiatric History anxiety depression          Physical Exam  General: Cooperative, Well nourished, Alert and Oriented    Airway:  Mallampati: II   Mouth Opening: Normal  TM Distance: Normal  Tongue: Normal  Neck ROM: Normal ROM    Dental:  Intact        Anesthesia Plan  Type of Anesthesia, risks & benefits discussed:    Anesthesia Type: Gen Natural Airway, MAC  Intra-op Monitoring Plan: Standard ASA Monitors  Post Op Pain Control Plan: multimodal analgesia and IV/PO Opioids PRN  Induction:  IV  Informed Consent: Informed consent signed with the Patient and all parties understand the risks and agree with anesthesia plan.  All questions answered. Patient consented to blood products? Yes  ASA Score: 3  Day of Surgery Review of History & Physical: I have interviewed and examined the patient. I have reviewed the patient's H&P dated: There are no significant changes.     Ready For Surgery From Anesthesia Perspective.     .   Past Medical History:   Diagnosis Date    Depression     Fibromyalgia        Past Surgical History:   Procedure Laterality Date    APPENDECTOMY      CARDIAC SURGERY      CORONARY ARTERY BYPASS GRAFT      HYSTERECTOMY      TOTAL    OOPHORECTOMY      SPINE SURGERY      TONSILLECTOMY         Family History   Problem Relation Age of Onset    Cancer Mother     Diabetes  Mother     Hypertension Mother     Parkinsonism Father     Diabetes Father     Hypertension Father     Colon cancer Sister     Hypoglycemic Sister     Diabetes Paternal Grandmother        Social History     Socioeconomic History    Marital status:      Spouse name: Harper    Number of children: 3   Occupational History     Comment: DISABILITY   Tobacco Use    Smoking status: Former     Packs/day: 1.00     Types: Cigarettes     Start date:      Quit date:      Years since quittin.2    Smokeless tobacco: Never    Tobacco comments:     smoked off and on   Substance and Sexual Activity    Alcohol use: Never    Drug use: Never    Sexual activity: Not Currently     Partners: Male       Current Outpatient Medications   Medication Sig Dispense Refill    allopurinoL (ZYLOPRIM) 100 MG tablet Take 1 tablet (100 mg total) by mouth once daily. 90 tablet 1    amLODIPine (NORVASC) 5 MG tablet Take 1 tablet (5 mg total) by mouth daily as needed. 30 tablet 11    aspirin (ECOTRIN) 81 MG EC tablet Take 81 mg by mouth once daily.      atorvastatin (LIPITOR) 80 MG tablet Take 1 tablet (80 mg total) by mouth once daily. 90 tablet 1    benazepriL (LOTENSIN) 40 MG tablet TAKE 1 TABLET EVERY NIGHT 90 tablet 1    buPROPion (WELLBUTRIN XL) 150 MG TB24 tablet TAKE 1 TABLET ONE TIME DAILY 90 tablet 1    calcium-vitamin D 600 mg-10 mcg (400 unit) Tab TAKE 1 TABLET EVERY DAY 90 tablet 1    carvediloL (COREG) 25 MG tablet Take 1 tablet (25 mg total) by mouth 2 (two) times daily. 180 tablet 1    diphenhydrAMINE (SOMINEX) 25 mg tablet Take 25 mg by mouth nightly as needed for Insomnia.      docusate sodium (COLACE) 100 MG capsule Take 100 mg by mouth 2 (two) times daily.      FEROSUL 325 mg (65 mg iron) Tab tablet TAKE 1 TABLET ONE TIME DAILY 90 tablet 1    hydrOXYzine HCL (ATARAX) 25 MG tablet TAKE 1 TABLET (25 MG TOTAL) BY MOUTH EVERY EVENING. 90 tablet 1    levothyroxine (SYNTHROID) 50 MCG tablet  Take 1 tablet (50 mcg total) by mouth once daily. 90 tablet 0    loratadine (CLARITIN) 10 mg tablet TAKE 1 TABLET EVERY DAY (Patient not taking: Reported on 3/13/2023) 90 tablet 1    LORazepam (ATIVAN) 0.5 MG tablet Take 1 tablet (0.5 mg total) by mouth every 8 (eight) hours as needed for Anxiety (anxiety). 21 tablet 0    potassium chloride (MICRO-K) 10 MEQ CpSR TAKE 1 CAPSULE ONE TIME DAILY 90 capsule 1    spironolactone (ALDACTONE) 25 MG tablet TAKE 1 TABLET ONE TIME DAILY 90 tablet 1    topiramate (TOPAMAX) 50 MG tablet TAKE 1 TABLET TWICE DAILY 180 tablet 1    vitamin D (VITAMIN D3) 1000 units Tab Take 1 tablet (1,000 Units total) by mouth once daily. 90 tablet 1     No current facility-administered medications for this encounter.       Review of patient's allergies indicates:   Allergen Reactions    Codeine Swelling    Codeine phosphate Other (See Comments)    Tetanus and diphtheria toxoids Itching    Tetanus immune globulin Other (See Comments)       Patient Active Problem List   Diagnosis    Fibromyalgia    Essential hypertension    Encounter for screening mammogram for malignant neoplasm of breast    Depression    Heart disease    Anxiety    Osteopenia    Hypothyroidism    Umbilical hernia without obstruction and without gangrene    Ventral hernia without obstruction or gangrene    Mass of adrenal gland    High risk medication use    Coronary artery disease involving native coronary artery of native heart    History of rheumatic fever    Back pain    Chest discomfort    Lumbar pain    Dorsalgia, unspecified    Mid back pain    History of total hysterectomy with bilateral salpingo-oophorectomy (BSO)    BMI 26.0-26.9,adult    Other long term (current) drug therapy    Abnormal blood finding    Post-menopausal    Nasal drainage    Cough    Screening for colon cancer    Hyperlipidemia

## 2023-03-24 NOTE — ANESTHESIA POSTPROCEDURE EVALUATION
Anesthesia Post Evaluation    Patient: Becki Mejia    Procedure(s) Performed: Colonoscopy    Final Anesthesia Type: general      Patient location during evaluation: GI PACU  Patient participation: Yes- Able to Participate  Level of consciousness: awake and alert  Post-procedure vital signs: reviewed and stable  Pain management: adequate  Airway patency: patent    PONV status at discharge: No PONV  Anesthetic complications: no      Cardiovascular status: blood pressure returned to baseline and hemodynamically stable  Respiratory status: spontaneous ventilation  Hydration status: euvolemic  Follow-up not needed.  Comments: Pt voices appreciation for care          Vitals Value Taken Time   /50 03/24/23 1242   Temp 36.2 °C (97.1 °F) 03/24/23 1213   Pulse 64 03/24/23 1243   Resp 11 03/24/23 1243   SpO2 99 % 03/24/23 1243   Vitals shown include unvalidated device data.      Event Time   Out of Recovery 12:48:13         Pain/Melvi Score: Melvi Score: 8 (3/24/2023 12:15 PM)

## 2023-03-24 NOTE — DISCHARGE INSTRUCTIONS
Procedure Date  3/24/23     Impression  Overall Impression: Diverticula were noted in the sigmoid and descending colon. One diminutive polyp was removed with biopsy from the rectum.     Recommendation  Await pathology results is recommended.  Repeat colonoscopy in 5 years is recommended.   High fiber diet  Discharge:   Disp: DC to home in stable condition. Resume diet. No driving x 24 hours. F/U with PCP as scheduled.  Dx; Family hx of colon cancer, colon diverticulosis, one diminutive polyp was removed on this exam.     Indication  Colon cancer screening; family hx of colon cancer

## 2023-03-24 NOTE — TRANSFER OF CARE
Anesthesia Transfer of Care Note    Patient: Becki Mejia    Procedure(s) Performed: * No procedures listed *    Patient location: GI    Anesthesia Type: general    Transport from OR: Transported from OR on room air with adequate spontaneous ventilation. Continuous ECG monitoring in transport. Continuous SpO2 monitoring in transport    Post pain: adequate analgesia    Post assessment: no apparent anesthetic complications    Post vital signs: stable    Level of consciousness: sedated and responds to stimulation    Nausea/Vomiting: no nausea/vomiting    Complications: none    Transfer of care protocol was followedComments: Good SV continue, NAD, VSS, RTRN      Last vitals:   Visit Vitals  BP (!) 100/49 (BP Location: Right arm, Patient Position: Lying)   Pulse 75   Temp 36.2 °C (97.1 °F) (Oral)   Resp 16   SpO2 98%

## 2023-03-27 LAB
DHEA SERPL-MCNC: NORMAL
ESTROGEN SERPL-MCNC: NORMAL PG/ML
INSULIN SERPL-ACNC: NORMAL U[IU]/ML
LAB AP GROSS DESCRIPTION: NORMAL
LAB AP LABORATORY NOTES: NORMAL
T3RU NFR SERPL: NORMAL %

## 2023-03-27 NOTE — PROGRESS NOTES
Place pt on list for colonoscopy in 5 yrs for family hx of colon cancer. The rectal polyp was a mucosal prolapse polyp.

## 2023-04-05 ENCOUNTER — TELEPHONE (OUTPATIENT)
Dept: GASTROENTEROLOGY | Facility: CLINIC | Age: 63
End: 2023-04-05
Payer: MEDICARE

## 2023-04-05 NOTE — TELEPHONE ENCOUNTER
Your colonoscopy pathology results show NO cancer or infections. Dr. Rosales removed a polyp, so he recommends repeating your colonoscopy in 5 years. Please continue to follow recommendations given after your colonoscopy and call our office with any questions or concerns.        ----- Message from Gage Rosales MD sent at 3/27/2023  4:00 PM CDT -----  Place pt on list for colonoscopy in 5 yrs for family hx of colon cancer. The rectal polyp was a mucosal prolapse polyp.

## 2023-04-11 DIAGNOSIS — E03.9 HYPOTHYROIDISM, UNSPECIFIED TYPE: ICD-10-CM

## 2023-04-12 RX ORDER — LEVOTHYROXINE SODIUM 50 UG/1
50 TABLET ORAL DAILY
Qty: 90 TABLET | Refills: 0 | Status: SHIPPED | OUTPATIENT
Start: 2023-04-12 | End: 2023-08-15

## 2023-04-12 RX ORDER — ALLOPURINOL 100 MG/1
100 TABLET ORAL DAILY
Qty: 90 TABLET | Refills: 0 | Status: SHIPPED | OUTPATIENT
Start: 2023-04-12 | End: 2023-08-30

## 2023-05-23 ENCOUNTER — PATIENT OUTREACH (OUTPATIENT)
Dept: ADMINISTRATIVE | Facility: HOSPITAL | Age: 63
End: 2023-05-23

## 2023-06-09 DIAGNOSIS — Z71.89 COMPLEX CARE COORDINATION: ICD-10-CM

## 2023-06-26 ENCOUNTER — OFFICE VISIT (OUTPATIENT)
Dept: FAMILY MEDICINE | Facility: CLINIC | Age: 63
End: 2023-06-26
Payer: MEDICARE

## 2023-06-26 VITALS
HEART RATE: 64 BPM | SYSTOLIC BLOOD PRESSURE: 145 MMHG | OXYGEN SATURATION: 97 % | DIASTOLIC BLOOD PRESSURE: 74 MMHG | RESPIRATION RATE: 18 BRPM | TEMPERATURE: 98 F | HEIGHT: 64 IN | WEIGHT: 170.63 LBS | BODY MASS INDEX: 29.13 KG/M2

## 2023-06-26 DIAGNOSIS — J20.9 ACUTE BRONCHITIS, UNSPECIFIED ORGANISM: Primary | ICD-10-CM

## 2023-06-26 PROCEDURE — 3077F SYST BP >= 140 MM HG: CPT | Mod: ,,, | Performed by: NURSE PRACTITIONER

## 2023-06-26 PROCEDURE — 3008F PR BODY MASS INDEX (BMI) DOCUMENTED: ICD-10-PCS | Mod: ,,, | Performed by: NURSE PRACTITIONER

## 2023-06-26 PROCEDURE — 3078F PR MOST RECENT DIASTOLIC BLOOD PRESSURE < 80 MM HG: ICD-10-PCS | Mod: ,,, | Performed by: NURSE PRACTITIONER

## 2023-06-26 PROCEDURE — 3077F PR MOST RECENT SYSTOLIC BLOOD PRESSURE >= 140 MM HG: ICD-10-PCS | Mod: ,,, | Performed by: NURSE PRACTITIONER

## 2023-06-26 PROCEDURE — 3008F BODY MASS INDEX DOCD: CPT | Mod: ,,, | Performed by: NURSE PRACTITIONER

## 2023-06-26 PROCEDURE — 1160F PR REVIEW ALL MEDS BY PRESCRIBER/CLIN PHARMACIST DOCUMENTED: ICD-10-PCS | Mod: ,,, | Performed by: NURSE PRACTITIONER

## 2023-06-26 PROCEDURE — 4010F PR ACE/ARB THEARPY RXD/TAKEN: ICD-10-PCS | Mod: ,,, | Performed by: NURSE PRACTITIONER

## 2023-06-26 PROCEDURE — 1160F RVW MEDS BY RX/DR IN RCRD: CPT | Mod: ,,, | Performed by: NURSE PRACTITIONER

## 2023-06-26 PROCEDURE — 99213 PR OFFICE/OUTPT VISIT, EST, LEVL III, 20-29 MIN: ICD-10-PCS | Mod: 25,,, | Performed by: NURSE PRACTITIONER

## 2023-06-26 PROCEDURE — 1159F MED LIST DOCD IN RCRD: CPT | Mod: ,,, | Performed by: NURSE PRACTITIONER

## 2023-06-26 PROCEDURE — 96372 PR INJECTION,THERAP/PROPH/DIAG2ST, IM OR SUBCUT: ICD-10-PCS | Mod: ,,, | Performed by: NURSE PRACTITIONER

## 2023-06-26 PROCEDURE — 3078F DIAST BP <80 MM HG: CPT | Mod: ,,, | Performed by: NURSE PRACTITIONER

## 2023-06-26 PROCEDURE — 96372 THER/PROPH/DIAG INJ SC/IM: CPT | Mod: ,,, | Performed by: NURSE PRACTITIONER

## 2023-06-26 PROCEDURE — 1159F PR MEDICATION LIST DOCUMENTED IN MEDICAL RECORD: ICD-10-PCS | Mod: ,,, | Performed by: NURSE PRACTITIONER

## 2023-06-26 PROCEDURE — 4010F ACE/ARB THERAPY RXD/TAKEN: CPT | Mod: ,,, | Performed by: NURSE PRACTITIONER

## 2023-06-26 PROCEDURE — 99213 OFFICE O/P EST LOW 20 MIN: CPT | Mod: 25,,, | Performed by: NURSE PRACTITIONER

## 2023-06-26 RX ORDER — DEXAMETHASONE SODIUM PHOSPHATE 4 MG/ML
4 INJECTION, SOLUTION INTRA-ARTICULAR; INTRALESIONAL; INTRAMUSCULAR; INTRAVENOUS; SOFT TISSUE
Status: COMPLETED | OUTPATIENT
Start: 2023-06-26 | End: 2023-06-26

## 2023-06-26 RX ORDER — PREDNISONE 5 MG/1
TABLET ORAL
Qty: 18 TABLET | Refills: 0 | Status: SHIPPED | OUTPATIENT
Start: 2023-06-26 | End: 2024-01-18

## 2023-06-26 RX ADMIN — DEXAMETHASONE SODIUM PHOSPHATE 4 MG: 4 INJECTION, SOLUTION INTRA-ARTICULAR; INTRALESIONAL; INTRAMUSCULAR; INTRAVENOUS; SOFT TISSUE at 09:06

## 2023-06-26 NOTE — PROGRESS NOTES
ASHA Milian   [facility]  [address]  [place] [address]     PATIENT NAME: Becki Mejia  : 1960  DATE: 23  MRN: [id number]      Billing Provider: ASHA Milian  Level of Service:   Patient PCP Information       Provider PCP Type    Imani Caldwell NP General            Reason for Visit / Chief Complaint: Cough and Sore Throat (Started feeling bad Friday night)   Health Maintenance Due   Topic Date Due    Hepatitis C Screening  Never done    Hemoglobin A1c (Diabetic Prevention Screening)  Never done    Shingles Vaccine (2 of 2) 10/07/2022    COVID-19 Vaccine (5 - Moderna series) 10/07/2022          Update PCP  Update Chief Complaint         History of Present Illness / Problem Focused Workflow     Becki Mejia presents to the clinic sore throat headache cough sudden onset no fever     No results found for: HGBA1C     CMP  Sodium   Date Value Ref Range Status   2023 137 136 - 145 mmol/L Final     Potassium   Date Value Ref Range Status   2023 4.5 3.5 - 5.1 mmol/L Final     Chloride   Date Value Ref Range Status   2023 103 98 - 107 mmol/L Final     CO2   Date Value Ref Range Status   2023 30 21 - 32 mmol/L Final     Glucose   Date Value Ref Range Status   2023 93 74 - 106 mg/dL Final     BUN   Date Value Ref Range Status   2023 22 (H) 7 - 18 mg/dL Final     Creatinine   Date Value Ref Range Status   2023 0.90 0.55 - 1.02 mg/dL Final     Calcium   Date Value Ref Range Status   2023 9.0 8.5 - 10.1 mg/dL Final     Total Protein   Date Value Ref Range Status   2023 6.8 6.4 - 8.2 g/dL Final     Albumin   Date Value Ref Range Status   2023 3.9 3.5 - 5.0 g/dL Final     Bilirubin, Total   Date Value Ref Range Status   2023 0.3 >0.0 - 1.2 mg/dL Final     Alk Phos   Date Value Ref Range Status   2023 50 50 - 130 U/L Final     AST   Date Value Ref Range Status   2023 21 15  - 37 U/L Final     ALT   Date Value Ref Range Status   01/17/2023 22 13 - 56 U/L Final     Anion Gap   Date Value Ref Range Status   01/17/2023 9 7 - 16 mmol/L Final     eGFR   Date Value Ref Range Status   01/17/2023 72 >=60 mL/min/1.73m² Final        Lab Results   Component Value Date    WBC 5.07 01/17/2023    RBC 3.97 (L) 01/17/2023    HGB 11.8 (L) 01/17/2023    HCT 36.6 (L) 01/17/2023    MCV 92.2 01/17/2023    MCH 29.7 01/17/2023    MCHC 32.2 01/17/2023    RDW 13.2 01/17/2023     01/17/2023    MPV 10.9 01/17/2023    LYMPH 34.1 01/17/2023    LYMPH 1.73 01/17/2023    MONO 9.7 (H) 01/17/2023    EOS 0.03 01/17/2023    BASO 0.04 01/17/2023    EOSINOPHIL 0.6 (L) 01/17/2023    BASOPHIL 0.8 01/17/2023        Lab Results   Component Value Date    CHOL 153 01/17/2023    CHOL 128 04/18/2022    CHOL 151 07/06/2021     Lab Results   Component Value Date    HDL 56 01/17/2023    HDL 50 04/18/2022    HDL 60 07/06/2021     Lab Results   Component Value Date    LDLCALC 80 01/17/2023    LDLCALC 66 04/18/2022    LDLCALC 69 07/06/2021     Lab Results   Component Value Date    TRIG 87 01/17/2023    TRIG 59 04/18/2022    TRIG 108 07/06/2021     Lab Results   Component Value Date    CHOLHDL 2.7 01/17/2023    CHOLHDL 2.6 04/18/2022    CHOLHDL 2.5 07/06/2021        Wt Readings from Last 3 Encounters:   06/26/23 0818 77.4 kg (170 lb 9.6 oz)   03/13/23 0920 74.9 kg (165 lb 3.2 oz)   02/22/23 1257 70.3 kg (155 lb)        BP Readings from Last 3 Encounters:   06/26/23 (!) 145/74   03/24/23 (!) 100/49   03/13/23 138/78        Review of Systems     Review of Systems   Constitutional:  Positive for fatigue. Negative for appetite change and fever.   HENT:  Positive for sinus pressure/congestion and sore throat. Negative for nasal congestion, ear pain and trouble swallowing.    Respiratory:  Positive for cough and chest tightness. Negative for shortness of breath and wheezing.    Cardiovascular:  Negative for chest pain and palpitations.    Gastrointestinal:  Negative for abdominal pain.   Integumentary:  Negative for wound.   Neurological:  Positive for weakness, light-headedness and headaches.      Medical / Social / Family History     Past Medical History:   Diagnosis Date    Depression     Fibromyalgia        Past Surgical History:   Procedure Laterality Date    APPENDECTOMY      CARDIAC SURGERY      CORONARY ARTERY BYPASS GRAFT      HYSTERECTOMY      TOTAL    OOPHORECTOMY      SPINE SURGERY      TONSILLECTOMY         Social History  Ms.  reports that she quit smoking about 16 years ago. Her smoking use included cigarettes. She started smoking about 35 years ago. She smoked an average of 1 pack per day. She has been exposed to tobacco smoke. She has never used smokeless tobacco. She reports that she does not drink alcohol and does not use drugs.    Family History  Ms.'s family history includes Cancer in her mother; Colon cancer in her sister; Diabetes in her father, mother, and paternal grandmother; Hypertension in her father and mother; Hypoglycemic in her sister; Parkinsonism in her father.    Medications and Allergies     Medications  Outpatient Medications Marked as Taking for the 6/26/23 encounter (Office Visit) with ASHA Milian   Medication Sig Dispense Refill    allopurinoL (ZYLOPRIM) 100 MG tablet Take 1 tablet (100 mg total) by mouth once daily. 90 tablet 0    amLODIPine (NORVASC) 5 MG tablet Take 1 tablet (5 mg total) by mouth daily as needed. 30 tablet 11    aspirin (ECOTRIN) 81 MG EC tablet Take 81 mg by mouth once daily.      atorvastatin (LIPITOR) 80 MG tablet Take 1 tablet (80 mg total) by mouth once daily. 90 tablet 1    benazepriL (LOTENSIN) 40 MG tablet TAKE 1 TABLET EVERY NIGHT 90 tablet 1    buPROPion (WELLBUTRIN XL) 150 MG TB24 tablet TAKE 1 TABLET ONE TIME DAILY 90 tablet 1    calcium-vitamin D 600 mg-10 mcg (400 unit) Tab TAKE 1 TABLET EVERY DAY 90 tablet 1    carvediloL (COREG) 25 MG tablet Take 1 tablet  "(25 mg total) by mouth 2 (two) times daily. 180 tablet 1    diphenhydrAMINE (SOMINEX) 25 mg tablet Take 25 mg by mouth nightly as needed for Insomnia.      docusate sodium (COLACE) 100 MG capsule Take 100 mg by mouth 2 (two) times daily.      FEROSUL 325 mg (65 mg iron) Tab tablet TAKE 1 TABLET ONE TIME DAILY 90 tablet 1    hydrOXYzine HCL (ATARAX) 25 MG tablet TAKE 1 TABLET (25 MG TOTAL) BY MOUTH EVERY EVENING. 90 tablet 1    levothyroxine (SYNTHROID) 50 MCG tablet Take 1 tablet (50 mcg total) by mouth once daily. 90 tablet 0    loratadine (CLARITIN) 10 mg tablet TAKE 1 TABLET EVERY DAY 90 tablet 1    potassium chloride (MICRO-K) 10 MEQ CpSR TAKE 1 CAPSULE ONE TIME DAILY 90 capsule 1    spironolactone (ALDACTONE) 25 MG tablet TAKE 1 TABLET ONE TIME DAILY 90 tablet 1    topiramate (TOPAMAX) 50 MG tablet TAKE 1 TABLET TWICE DAILY 180 tablet 1    vitamin D (VITAMIN D3) 1000 units Tab Take 1 tablet (1,000 Units total) by mouth once daily. 90 tablet 1     Current Facility-Administered Medications for the 6/26/23 encounter (Office Visit) with ASHA Milian   Medication Dose Route Frequency Provider Last Rate Last Admin    dexAMETHasone injection 4 mg  4 mg Intramuscular 1 time in Clinic/HOD ASHA Milian           Allergies  Review of patient's allergies indicates:   Allergen Reactions    Codeine Swelling    Codeine phosphate Other (See Comments)    Tetanus and diphtheria toxoids Itching    Tetanus immune globulin Other (See Comments)       Physical Examination     Vitals:    06/26/23 0818   BP: (!) 145/74   BP Location: Left arm   Patient Position: Sitting   BP Method: Medium (Automatic)   Pulse: 64   Resp: 18   Temp: 98.4 °F (36.9 °C)   TempSrc: Oral   SpO2: 97%   Weight: 77.4 kg (170 lb 9.6 oz)   Height: 5' 4" (1.626 m)      Physical Exam  Vitals and nursing note reviewed.   Constitutional:       General: She is not in acute distress.     Appearance: Normal appearance. She is not " ill-appearing, toxic-appearing or diaphoretic.   HENT:      Right Ear: Tympanic membrane, ear canal and external ear normal.      Left Ear: Tympanic membrane, ear canal and external ear normal.      Nose: Rhinorrhea present.      Mouth/Throat:      Mouth: Mucous membranes are moist.      Pharynx: Posterior oropharyngeal erythema present.   Cardiovascular:      Rate and Rhythm: Normal rate and regular rhythm.   Pulmonary:      Effort: Pulmonary effort is normal.      Breath sounds: No wheezing, rhonchi or rales.      Comments: Dry cough  Skin:     General: Skin is warm and dry.      Capillary Refill: Capillary refill takes less than 2 seconds.   Neurological:      Mental Status: She is alert and oriented to person, place, and time.   Psychiatric:         Behavior: Behavior normal.        Assessment and Plan (including Health Maintenance)      Problem List  Smart Interview  Document Outside HM   :    Plan:     Patient Instructions   Decadron today tomorrow start taper prednisone stay well hydrated avoid irritants       Health Maintenance Due   Topic Date Due    Hepatitis C Screening  Never done    Hemoglobin A1c (Diabetic Prevention Screening)  Never done    Shingles Vaccine (2 of 2) 10/07/2022    COVID-19 Vaccine (5 - Moderna series) 10/07/2022       Problem List Items Addressed This Visit    None  Visit Diagnoses       Acute bronchitis, unspecified organism    -  Primary    Relevant Medications    dexAMETHasone injection 4 mg    predniSONE (DELTASONE) 5 MG tablet          Acute bronchitis, unspecified organism  -     dexAMETHasone injection 4 mg  -     predniSONE (DELTASONE) 5 MG tablet; Take three tablets daily for three days, take two tablets daily for three days, take one tablet daily for three days  Dispense: 18 tablet; Refill: 0       Health Maintenance Topics with due status: Not Due       Topic Last Completion Date    DEXA Scan 07/15/2022    Lipid Panel 01/17/2023    Mammogram 02/22/2023    Aspirin/Antiplatelet  Therapy 03/24/2023    Colorectal Cancer Screening 03/24/2023         Future Appointments   Date Time Provider Department Center   6/26/2023 10:30 AM ASHA Milian [facility]   7/19/2023  9:30 AM AWV NURSE, [facility]ILY [facility]IC[facility]   9/13/2023  8:45 AM ADRIANNA Figueroa [facility]   2/28/2024  1:00 PM [facility] MAMMO1 [facility] [place]        Follow up if symptoms worsen or fail to improve.    Health Maintenance Due   Topic Date Due    Hepatitis C Screening  Never done    Hemoglobin A1c (Diabetic Prevention Screening)  Never done    Shingles Vaccine (2 of 2) 10/07/2022    COVID-19 Vaccine (5 - Moderna series) 10/07/2022        Signature:  ASHA Milian    Date of encounter: 6/26/23

## 2023-07-02 DIAGNOSIS — I10 ESSENTIAL HYPERTENSION: ICD-10-CM

## 2023-07-04 RX ORDER — CARVEDILOL 25 MG/1
TABLET ORAL
Qty: 180 TABLET | Refills: 1 | Status: SHIPPED | OUTPATIENT
Start: 2023-07-04 | End: 2024-01-18 | Stop reason: SDUPTHER

## 2023-07-17 NOTE — PROGRESS NOTES
[facility] AWV   [facility]     PATIENT NAME: Becki Mejia   : 1960    AGE: 62 y.o. DATE: 2023   MRN: [id number]        Reason for Visit / Chief Complaint: Medicare AWV (Medicare SUBS AWV )        Becki Mejia presents for a Subsequent Medicare AWV today.     The following components were reviewed and updated:    Medical/Social/Family History:  Past Medical History:   Diagnosis Date    Depression     Fibromyalgia         Family History   Problem Relation Age of Onset    Cancer Mother     Diabetes Mother     Hypertension Mother     Parkinsonism Father     Diabetes Father     Hypertension Father     Colon cancer Sister     Hypoglycemic Sister     Diabetes Paternal Grandmother         Social History     Tobacco Use   Smoking Status Former    Packs/day: 1.00    Years: 19.00    Pack years: 19.00    Types: Cigarettes    Start date:     Quit date:     Years since quittin.5    Passive exposure: Past   Smokeless Tobacco Never   Tobacco Comments    smoked off and on      Social History     Substance and Sexual Activity   Alcohol Use Never       Family History   Problem Relation Age of Onset    Cancer Mother     Diabetes Mother     Hypertension Mother     Parkinsonism Father     Diabetes Father     Hypertension Father     Colon cancer Sister     Hypoglycemic Sister     Diabetes Paternal Grandmother        Past Surgical History:   Procedure Laterality Date    APPENDECTOMY      CARDIAC SURGERY      CORONARY ARTERY BYPASS GRAFT      HYSTERECTOMY      TOTAL    OOPHORECTOMY      SPINE SURGERY      TONSILLECTOMY           Allergies and Current Medications     Review of patient's allergies indicates:   Allergen Reactions    Codeine Swelling    Codeine phosphate Other (See Comments)    Tetanus and diphtheria toxoids Itching    Tetanus immune globulin Other (See Comments)       Current Outpatient Medications:     allopurinoL (ZYLOPRIM) 100 MG tablet, Take 1 tablet (100  mg total) by mouth once daily., Disp: 90 tablet, Rfl: 0    amLODIPine (NORVASC) 5 MG tablet, Take 1 tablet (5 mg total) by mouth daily as needed., Disp: 30 tablet, Rfl: 11    aspirin (ECOTRIN) 81 MG EC tablet, Take 81 mg by mouth once daily., Disp: , Rfl:     atorvastatin (LIPITOR) 80 MG tablet, Take 1 tablet (80 mg total) by mouth once daily., Disp: 90 tablet, Rfl: 1    benazepriL (LOTENSIN) 40 MG tablet, TAKE 1 TABLET EVERY NIGHT, Disp: 90 tablet, Rfl: 1    buPROPion (WELLBUTRIN XL) 150 MG TB24 tablet, TAKE 1 TABLET ONE TIME DAILY, Disp: 90 tablet, Rfl: 1    carvediloL (COREG) 25 MG tablet, TAKE 1 TABLET TWICE DAILY, Disp: 180 tablet, Rfl: 1    diphenhydrAMINE (SOMINEX) 25 mg tablet, Take 25 mg by mouth nightly as needed for Insomnia., Disp: , Rfl:     docusate sodium (COLACE) 100 MG capsule, Take 100 mg by mouth 2 (two) times daily., Disp: , Rfl:     FEROSUL 325 mg (65 mg iron) Tab tablet, TAKE 1 TABLET ONE TIME DAILY, Disp: 90 tablet, Rfl: 1    hydrOXYzine HCL (ATARAX) 25 MG tablet, TAKE 1 TABLET (25 MG TOTAL) BY MOUTH EVERY EVENING., Disp: 90 tablet, Rfl: 1    levothyroxine (SYNTHROID) 50 MCG tablet, Take 1 tablet (50 mcg total) by mouth once daily., Disp: 90 tablet, Rfl: 0    loratadine (CLARITIN) 10 mg tablet, TAKE 1 TABLET EVERY DAY, Disp: 90 tablet, Rfl: 1    LORazepam (ATIVAN) 0.5 MG tablet, Take 1 tablet (0.5 mg total) by mouth every 8 (eight) hours as needed for Anxiety (anxiety)., Disp: 21 tablet, Rfl: 0    potassium chloride (MICRO-K) 10 MEQ CpSR, TAKE 1 CAPSULE ONE TIME DAILY, Disp: 90 capsule, Rfl: 1    spironolactone (ALDACTONE) 25 MG tablet, TAKE 1 TABLET ONE TIME DAILY, Disp: 90 tablet, Rfl: 1    topiramate (TOPAMAX) 50 MG tablet, TAKE 1 TABLET TWICE DAILY, Disp: 180 tablet, Rfl: 1    vitamin D (VITAMIN D3) 1000 units Tab, Take 1 tablet (1,000 Units total) by mouth once daily., Disp: 90 tablet, Rfl: 1    calcium-vitamin D 600 mg-10 mcg (400 unit) Tab, TAKE 1 TABLET EVERY DAY (Patient not taking:  Reported on 7/19/2023), Disp: 90 tablet, Rfl: 1    predniSONE (DELTASONE) 5 MG tablet, Take three tablets daily for three days, take two tablets daily for three days, take one tablet daily for three days (Patient not taking: Reported on 7/19/2023), Disp: 18 tablet, Rfl: 0    Health Risk Assessment   Fall Risk: No   Obesity: BMI 29.19     Advance Directive:  Does not have an advanced directive. Verbal education and written education included in today's AVS.   Depression: PHQ9  2   HTN:  yes, DASH diet, exercise, weight management, med compliance, home BP monitoring, and follow-up discussed.   T2DM: NA   Tobacco use: Former Smoker. Quit 2007  STI: NA    Alcohol misuse: Denies   Statin Use: No      Health Risk Assessment  What is your age?:  (62)  Are you male or female?: Female  During the past four weeks, how much have you been bothered by emotional problems such as feeling anxious, depressed, irritable, sad, or downhearted and blue?: Not at all  During the past five weeks, has your physical and/or emotional health limited your social activities with family, friends, neighbors, or groups?: Not at all  During the past four weeks, how much bodily pain have you generally had?: Moderate pain  During the past four weeks, was someone available to help if you needed and wanted help?: Yes, as much as I wanted  During the past four weeks, what was the hardest physical activity you could do for at least two minutes?: Heavy  Can you get to places out of walking distance without help?  (For example, can you travel alone on buses or taxis, or drive your own car?): Yes  Can you go shopping for groceries or clothes without someone's help?: Yes  Can you prepare your own meals?: Yes  Can you do your own housework without help?: Yes  Because of any health problems, do you need the help of another person with your personal care needs such as eating, bathing, dressing, or getting around the house?: No  Can you handle your own money  without help?: Yes  During the past four weeks, how would you rate your health in general?: Fair  How have things been going for you during the past four weeks?: Pretty well  Are you having difficulties driving your car?: No  Do you always fasten your seat belt when you are in a car?: Yes, usually  How often in the past four weeks have you been bothered by falling or dizzy when standing up?: Seldom  How often in the past four weeks have you been bothered by sexual problems?: Never  How often in the past four weeks have you been bothered by trouble eating well?: Never  How often in the past four weeks have you been bothered by teeth or denture problems?: Never  How often in the past four weeks have you been bothered with problems using the telephone?: Never  How often in the past four weeks have you been bothered by tiredness or fatigue?: Often  Have you fallen two or more times in the past year?: No  Are you afraid of falling?: No  Are you a smoker?: No  During the past four weeks, how many drinks of wine, beer, or other alcoholic beverages did you have?: No alcohol at all  Do you exercise for about 20 minutes three or more days a week?: Yes, most of the time  Have you been given any information to help you with hazards in your house that might hurt you?: No  Have you been given any information to help you with keeping track of your medications?: No  How often do you have trouble taking medicines the way you've been told to take them?: I always take them as prescribed  How confident are you that you can control and manage most of your health problems?: Somewhat confident  What is your race? (Check all that apply.):     Health Maintenance   Last eye exam: 2021. Pt reports has appt with Dr. Boyd in 08/2023   Last CV screen with lipids: 07/17/202307/17/2023   Colonoscopy: 03/24/2023 repeat in 5 years   Flu Vaccine: 10/03/2022   Pneumonia vaccines: NA   COVID vaccine: Moderna 04/02/2021 04/30/2021 12/20/2021  08/12/2022, declined booster today   Hep B vaccine: NA   DEXA: 07/15/2022   Last pap/pelvic: Pt had total hysterectomy   Last Mammogram: 02/22/2023   Last PSA screen: NA   AAA screening: NA (once in lifetime for males 65-75 who have smoked > 100 cigarettes in lifetime)  HIV Screeing: Declined  Hepatitis C Screen: Eligible  Low Dose CT Scan: NA    Health Maintenance Topics with due status: Not Due       Topic Last Completion Date    DEXA Scan 07/15/2022    Influenza Vaccine 10/03/2022    Mammogram 02/22/2023    Colorectal Cancer Screening 03/24/2023    Aspirin/Antiplatelet Therapy 06/26/2023    Lipid Panel 07/17/2023     Health Maintenance Due   Topic Date Due    Hepatitis C Screening  Never done    HIV Screening  Never done    Hemoglobin A1c (Diabetic Prevention Screening)  Never done    Shingles Vaccine (2 of 2) 10/07/2022    COVID-19 Vaccine (5 - Moderna series) 10/07/2022       Incontinence  Bowel: Yes  Bladder: Yes    Lab results available in Epic or see dates from [facility] above:   Lab Results   Component Value Date    CHOL 144 07/17/2023    CHOL 153 01/17/2023    CHOL 128 04/18/2022     Lab Results   Component Value Date    HDL 59 07/17/2023    HDL 56 01/17/2023    HDL 50 04/18/2022     Lab Results   Component Value Date    LDLCALC 73 07/17/2023    LDLCALC 80 01/17/2023    LDLCALC 66 04/18/2022     Lab Results   Component Value Date    TRIG 59 07/17/2023    TRIG 87 01/17/2023    TRIG 59 04/18/2022     Lab Results   Component Value Date    CHOLHDL 2.4 07/17/2023    CHOLHDL 2.7 01/17/2023    CHOLHDL 2.6 04/18/2022       No results found for: LABA1C, HGBA1C    Sodium   Date Value Ref Range Status   07/17/2023 137 136 - 145 mmol/L Final     Potassium   Date Value Ref Range Status   07/17/2023 4.2 3.5 - 5.1 mmol/L Final     Chloride   Date Value Ref Range Status   07/17/2023 108 (H) 98 - 107 mmol/L Final     CO2   Date Value Ref Range Status   07/17/2023 27 21 - 32 mmol/L Final     Glucose   Date Value Ref Range Status  "  07/17/2023 104 74 - 106 mg/dL Final     BUN   Date Value Ref Range Status   07/17/2023 11 7 - 18 mg/dL Final     Creatinine   Date Value Ref Range Status   07/17/2023 0.99 0.55 - 1.02 mg/dL Final     Calcium   Date Value Ref Range Status   07/17/2023 8.2 (L) 8.5 - 10.1 mg/dL Final     Total Protein   Date Value Ref Range Status   07/17/2023 6.5 6.4 - 8.2 g/dL Final     Albumin   Date Value Ref Range Status   07/17/2023 3.4 (L) 3.5 - 5.0 g/dL Final     Bilirubin, Total   Date Value Ref Range Status   07/17/2023 0.3 >0.0 - 1.2 mg/dL Final     Alk Phos   Date Value Ref Range Status   07/17/2023 55 50 - 130 U/L Final     AST   Date Value Ref Range Status   07/17/2023 22 15 - 37 U/L Final     ALT   Date Value Ref Range Status   07/17/2023 20 13 - 56 U/L Final     Anion Gap   Date Value Ref Range Status   07/17/2023 6 (L) 7 - 16 mmol/L Final     eGFR   Date Value Ref Range Status   04/18/2022 64 >=60 mL/min/1.73m² Final         No results found for: PSA (Delete this line if female pt)        Care Team   PCP: Imani ABERNATHYP    Eye specialist: Dr. Boyd         **See Completed Assessments for Annual Wellness visit within the encounter summary    The following assessments were completed & reviewed:  Depression Screening  Cognitive function Screening  Timed Get Up Test  Whisper Test  Vision Screen  Health Risk Assessment  Checklist of ADLs and IADLs      Objective  Vitals:    07/19/23 0854   BP: 118/72   Pulse: 60   Resp: 20   Temp: 97.8 °F (36.6 °C)   SpO2: 97%   Weight: 77.1 kg (170 lb 1.3 oz)   Height: 5' 4" (1.626 m)   PainSc: 0-No pain   PainLoc: Leg      Body mass index is 29.19 kg/m².  Ideal body weight: 54.7 kg (120 lb 9.5 oz)       Physical Exam  Vitals and nursing note reviewed.   Constitutional:       Appearance: Normal appearance.   HENT:      Head: Normocephalic.      Right Ear: Tympanic membrane, ear canal and external ear normal.      Left Ear: Tympanic membrane, ear canal and external ear normal.      " Nose: Nose normal.      Mouth/Throat:      Mouth: Mucous membranes are moist.      Pharynx: Oropharynx is clear.   Eyes:      Extraocular Movements: Extraocular movements intact.      Conjunctiva/sclera: Conjunctivae normal.      Pupils: Pupils are equal, round, and reactive to light.   Cardiovascular:      Rate and Rhythm: Normal rate and regular rhythm.      Pulses: Normal pulses.      Heart sounds: Normal heart sounds.   Pulmonary:      Effort: Pulmonary effort is normal.      Breath sounds: Normal breath sounds.   Abdominal:      General: Bowel sounds are normal.      Palpations: Abdomen is soft.   Musculoskeletal:         General: Normal range of motion.      Cervical back: Normal range of motion and neck supple.   Skin:     General: Skin is warm and dry.      Capillary Refill: Capillary refill takes less than 2 seconds.   Neurological:      General: No focal deficit present.      Mental Status: She is alert and oriented to person, place, and time.   Psychiatric:         Mood and Affect: Mood normal.         Behavior: Behavior normal.         Assessment:     1. Encounter for subsequent annual wellness visit (AWV) in Medicare patient    2. BMI 29.0-29.9,adult    3. Essential hypertension    4. Hypothyroidism, unspecified type    5. Mass of adrenal gland       Problem List Items Addressed This Visit          Cardiac/Vascular    Essential hypertension    Current Assessment & Plan     The current medical regimen is effective;  continue present plan and medications.              Endocrine    Hypothyroidism    Mass of adrenal gland    Overview     Stable in size (7 mm), over time, when compared to previous CT scan  No evidence of functioning adenoma in history         Current Assessment & Plan     Keep carmelita with surgeon as he monitors mass         BMI 29.0-29.9,adult       Other    Encounter for subsequent annual wellness visit (AWV) in Medicare patient - Primary    Current Assessment & Plan     Health goals to  improve overall health and well-being:    Healthy/DASH diet, exercise at least 5 days per week  fasting glucose < 100  SBP < 130 & DBP < 80  LDL chol < 100  Stressed medication adherence.                   Plan:  Encounter for subsequent annual wellness visit (AWV) in Medicare patient    BMI 29.0-29.9,adult    Essential hypertension    Hypothyroidism, unspecified type    Mass of adrenal gland          Referrals:       Advised to call office if does not hear from anyone with referral appt within 2-3 weeks to check on status of referral. Voiced understanding.        Discussed and provided with a screening schedule and personal prevention plan in accordance with USPSTF age appropriate recommendations and Medicare screening guidelines.   Education, counseling, and referrals were provided as needed.  After Visit Summary printed and given to patient which includes written education and a list of any referrals if indicated.     F/u plan for yearly AWV.    Signature:

## 2023-07-19 ENCOUNTER — OFFICE VISIT (OUTPATIENT)
Dept: FAMILY MEDICINE | Facility: CLINIC | Age: 63
End: 2023-07-19
Payer: MEDICARE

## 2023-07-19 VITALS
SYSTOLIC BLOOD PRESSURE: 118 MMHG | WEIGHT: 170.06 LBS | TEMPERATURE: 98 F | OXYGEN SATURATION: 97 % | HEIGHT: 64 IN | RESPIRATION RATE: 20 BRPM | HEART RATE: 60 BPM | BODY MASS INDEX: 29.03 KG/M2 | DIASTOLIC BLOOD PRESSURE: 72 MMHG

## 2023-07-19 DIAGNOSIS — I10 ESSENTIAL HYPERTENSION: ICD-10-CM

## 2023-07-19 DIAGNOSIS — E03.9 HYPOTHYROIDISM, UNSPECIFIED TYPE: ICD-10-CM

## 2023-07-19 DIAGNOSIS — E27.8 MASS OF ADRENAL GLAND: ICD-10-CM

## 2023-07-19 DIAGNOSIS — Z00.00 ENCOUNTER FOR SUBSEQUENT ANNUAL WELLNESS VISIT (AWV) IN MEDICARE PATIENT: Primary | ICD-10-CM

## 2023-07-19 PROBLEM — Z12.31 ENCOUNTER FOR SCREENING MAMMOGRAM FOR MALIGNANT NEOPLASM OF BREAST: Status: RESOLVED | Noted: 2021-06-09 | Resolved: 2023-07-19

## 2023-07-19 PROBLEM — F41.9 ANXIETY: Status: RESOLVED | Noted: 2021-07-06 | Resolved: 2023-07-19

## 2023-07-19 PROBLEM — R07.89 CHEST DISCOMFORT: Status: RESOLVED | Noted: 2022-04-27 | Resolved: 2023-07-19

## 2023-07-19 PROBLEM — R05.9 COUGH: Status: RESOLVED | Noted: 2022-12-12 | Resolved: 2023-07-19

## 2023-07-19 PROBLEM — K42.9 UMBILICAL HERNIA WITHOUT OBSTRUCTION AND WITHOUT GANGRENE: Status: RESOLVED | Noted: 2021-08-30 | Resolved: 2023-07-19

## 2023-07-19 PROBLEM — M54.9 BACK PAIN: Status: RESOLVED | Noted: 2022-04-27 | Resolved: 2023-07-19

## 2023-07-19 PROBLEM — K62.1 POLYP OF RECTUM: Status: RESOLVED | Noted: 2023-03-24 | Resolved: 2023-07-19

## 2023-07-19 PROBLEM — J34.89 NASAL DRAINAGE: Status: RESOLVED | Noted: 2022-12-12 | Resolved: 2023-07-19

## 2023-07-19 PROCEDURE — 3066F NEPHROPATHY DOC TX: CPT | Mod: ,,, | Performed by: NURSE PRACTITIONER

## 2023-07-19 PROCEDURE — 3074F PR MOST RECENT SYSTOLIC BLOOD PRESSURE < 130 MM HG: ICD-10-PCS | Mod: ,,, | Performed by: NURSE PRACTITIONER

## 2023-07-19 PROCEDURE — 3061F PR NEG MICROALBUMINURIA RESULT DOCUMENTED/REVIEW: ICD-10-PCS | Mod: ,,, | Performed by: NURSE PRACTITIONER

## 2023-07-19 PROCEDURE — 4010F PR ACE/ARB THEARPY RXD/TAKEN: ICD-10-PCS | Mod: ,,, | Performed by: NURSE PRACTITIONER

## 2023-07-19 PROCEDURE — 3008F BODY MASS INDEX DOCD: CPT | Mod: ,,, | Performed by: NURSE PRACTITIONER

## 2023-07-19 PROCEDURE — 3078F PR MOST RECENT DIASTOLIC BLOOD PRESSURE < 80 MM HG: ICD-10-PCS | Mod: ,,, | Performed by: NURSE PRACTITIONER

## 2023-07-19 PROCEDURE — 3066F PR DOCUMENTATION OF TREATMENT FOR NEPHROPATHY: ICD-10-PCS | Mod: ,,, | Performed by: NURSE PRACTITIONER

## 2023-07-19 PROCEDURE — 1159F MED LIST DOCD IN RCRD: CPT | Mod: ,,, | Performed by: NURSE PRACTITIONER

## 2023-07-19 PROCEDURE — 3078F DIAST BP <80 MM HG: CPT | Mod: ,,, | Performed by: NURSE PRACTITIONER

## 2023-07-19 PROCEDURE — 4010F ACE/ARB THERAPY RXD/TAKEN: CPT | Mod: ,,, | Performed by: NURSE PRACTITIONER

## 2023-07-19 PROCEDURE — 3008F PR BODY MASS INDEX (BMI) DOCUMENTED: ICD-10-PCS | Mod: ,,, | Performed by: NURSE PRACTITIONER

## 2023-07-19 PROCEDURE — 3061F NEG MICROALBUMINURIA REV: CPT | Mod: ,,, | Performed by: NURSE PRACTITIONER

## 2023-07-19 PROCEDURE — 1159F PR MEDICATION LIST DOCUMENTED IN MEDICAL RECORD: ICD-10-PCS | Mod: ,,, | Performed by: NURSE PRACTITIONER

## 2023-07-19 PROCEDURE — G0439 PR MEDICARE ANNUAL WELLNESS SUBSEQUENT VISIT: ICD-10-PCS | Mod: ,,, | Performed by: NURSE PRACTITIONER

## 2023-07-19 PROCEDURE — G0439 PPPS, SUBSEQ VISIT: HCPCS | Mod: ,,, | Performed by: NURSE PRACTITIONER

## 2023-07-19 PROCEDURE — 3074F SYST BP LT 130 MM HG: CPT | Mod: ,,, | Performed by: NURSE PRACTITIONER

## 2023-07-19 NOTE — ASSESSMENT & PLAN NOTE
Health goals to improve overall health and well-being:    Healthy/DASH diet, exercise at least 5 days per week  fasting glucose < 100  SBP < 130 & DBP < 80  LDL chol < 100  Stressed medication adherence.

## 2023-07-19 NOTE — PATIENT INSTRUCTIONS
Counseling and Referral of Other Preventative  (Italic type indicates deductible and co-insurance are waived)    Patient Name: Becki Mejia  Today's Date: 7/19/2023    Health Maintenance         Date Due Completion Date    Hepatitis C Screening Never done ---    HIV Screening Never done ---    Hemoglobin A1c (Diabetic Prevention Screening) Never done ---    Shingles Vaccine (2 of 2) 10/07/2022 8/12/2022    COVID-19 Vaccine (5 - Moderna series) 10/07/2022 8/12/2022    Influenza Vaccine (1) 09/01/2023 10/3/2022    Mammogram 02/22/2024 2/22/2023    Aspirin/Antiplatelet Therapy 06/26/2024 6/26/2023    Lipid Panel 07/17/2024 7/17/2023    DEXA Scan 07/15/2025 7/15/2022    Colorectal Cancer Screening 03/24/2028 3/24/2023          No orders of the defined types were placed in this encounter.

## 2023-07-29 DIAGNOSIS — E03.9 HYPOTHYROIDISM, UNSPECIFIED TYPE: ICD-10-CM

## 2023-08-15 RX ORDER — LEVOTHYROXINE SODIUM 50 UG/1
50 TABLET ORAL
Qty: 90 TABLET | Refills: 0 | Status: SHIPPED | OUTPATIENT
Start: 2023-08-15 | End: 2024-01-18 | Stop reason: SDUPTHER

## 2023-08-30 RX ORDER — ALLOPURINOL 100 MG/1
100 TABLET ORAL
Qty: 90 TABLET | Refills: 0 | Status: SHIPPED | OUTPATIENT
Start: 2023-08-30 | End: 2024-01-18 | Stop reason: SDUPTHER

## 2023-09-05 ENCOUNTER — PATIENT OUTREACH (OUTPATIENT)
Dept: ADMINISTRATIVE | Facility: HOSPITAL | Age: 63
End: 2023-09-05

## 2023-09-05 NOTE — PROGRESS NOTES
[facility] chart audit for the following:    Bp control- last Bp was 118/72 on 7/19/2023

## 2023-09-19 ENCOUNTER — OFFICE VISIT (OUTPATIENT)
Dept: CARDIOLOGY | Facility: CLINIC | Age: 63
End: 2023-09-19
Payer: MEDICARE

## 2023-09-19 VITALS
HEIGHT: 64 IN | DIASTOLIC BLOOD PRESSURE: 64 MMHG | SYSTOLIC BLOOD PRESSURE: 118 MMHG | OXYGEN SATURATION: 97 % | HEART RATE: 74 BPM | BODY MASS INDEX: 28.24 KG/M2 | WEIGHT: 165.38 LBS

## 2023-09-19 DIAGNOSIS — I25.10 CORONARY ARTERY DISEASE, UNSPECIFIED VESSEL OR LESION TYPE, UNSPECIFIED WHETHER ANGINA PRESENT, UNSPECIFIED WHETHER NATIVE OR TRANSPLANTED HEART: Primary | ICD-10-CM

## 2023-09-19 DIAGNOSIS — E78.5 HYPERLIPIDEMIA, UNSPECIFIED HYPERLIPIDEMIA TYPE: ICD-10-CM

## 2023-09-19 DIAGNOSIS — I73.9 BILATERAL CLAUDICATION OF LOWER LIMB: ICD-10-CM

## 2023-09-19 DIAGNOSIS — I10 ESSENTIAL HYPERTENSION: ICD-10-CM

## 2023-09-19 DIAGNOSIS — I73.9 CLAUDICATION: ICD-10-CM

## 2023-09-19 DIAGNOSIS — I25.10 CORONARY ARTERY DISEASE INVOLVING NATIVE CORONARY ARTERY OF NATIVE HEART WITHOUT ANGINA PECTORIS: ICD-10-CM

## 2023-09-19 PROCEDURE — 93005 ELECTROCARDIOGRAM TRACING: CPT | Mod: PBBFAC | Performed by: INTERNAL MEDICINE

## 2023-09-19 PROCEDURE — 1159F PR MEDICATION LIST DOCUMENTED IN MEDICAL RECORD: ICD-10-PCS | Mod: CPTII,,, | Performed by: NURSE PRACTITIONER

## 2023-09-19 PROCEDURE — 3061F PR NEG MICROALBUMINURIA RESULT DOCUMENTED/REVIEW: ICD-10-PCS | Mod: CPTII,,, | Performed by: NURSE PRACTITIONER

## 2023-09-19 PROCEDURE — 93010 EKG 12-LEAD: ICD-10-PCS | Mod: S$PBB,,, | Performed by: INTERNAL MEDICINE

## 2023-09-19 PROCEDURE — 3078F PR MOST RECENT DIASTOLIC BLOOD PRESSURE < 80 MM HG: ICD-10-PCS | Mod: CPTII,,, | Performed by: NURSE PRACTITIONER

## 2023-09-19 PROCEDURE — 3008F PR BODY MASS INDEX (BMI) DOCUMENTED: ICD-10-PCS | Mod: CPTII,,, | Performed by: NURSE PRACTITIONER

## 2023-09-19 PROCEDURE — 3074F PR MOST RECENT SYSTOLIC BLOOD PRESSURE < 130 MM HG: ICD-10-PCS | Mod: CPTII,,, | Performed by: NURSE PRACTITIONER

## 2023-09-19 PROCEDURE — 3061F NEG MICROALBUMINURIA REV: CPT | Mod: CPTII,,, | Performed by: NURSE PRACTITIONER

## 2023-09-19 PROCEDURE — 3008F BODY MASS INDEX DOCD: CPT | Mod: CPTII,,, | Performed by: NURSE PRACTITIONER

## 2023-09-19 PROCEDURE — 99214 OFFICE O/P EST MOD 30 MIN: CPT | Mod: S$PBB,,, | Performed by: NURSE PRACTITIONER

## 2023-09-19 PROCEDURE — 3074F SYST BP LT 130 MM HG: CPT | Mod: CPTII,,, | Performed by: NURSE PRACTITIONER

## 2023-09-19 PROCEDURE — 93010 ELECTROCARDIOGRAM REPORT: CPT | Mod: S$PBB,,, | Performed by: INTERNAL MEDICINE

## 2023-09-19 PROCEDURE — 1160F RVW MEDS BY RX/DR IN RCRD: CPT | Mod: CPTII,,, | Performed by: NURSE PRACTITIONER

## 2023-09-19 PROCEDURE — 3066F PR DOCUMENTATION OF TREATMENT FOR NEPHROPATHY: ICD-10-PCS | Mod: CPTII,,, | Performed by: NURSE PRACTITIONER

## 2023-09-19 PROCEDURE — 3078F DIAST BP <80 MM HG: CPT | Mod: CPTII,,, | Performed by: NURSE PRACTITIONER

## 2023-09-19 PROCEDURE — 4010F ACE/ARB THERAPY RXD/TAKEN: CPT | Mod: CPTII,,, | Performed by: NURSE PRACTITIONER

## 2023-09-19 PROCEDURE — 99215 OFFICE O/P EST HI 40 MIN: CPT | Mod: PBBFAC | Performed by: NURSE PRACTITIONER

## 2023-09-19 PROCEDURE — 99214 PR OFFICE/OUTPT VISIT, EST, LEVL IV, 30-39 MIN: ICD-10-PCS | Mod: S$PBB,,, | Performed by: NURSE PRACTITIONER

## 2023-09-19 PROCEDURE — 3066F NEPHROPATHY DOC TX: CPT | Mod: CPTII,,, | Performed by: NURSE PRACTITIONER

## 2023-09-19 PROCEDURE — 1159F MED LIST DOCD IN RCRD: CPT | Mod: CPTII,,, | Performed by: NURSE PRACTITIONER

## 2023-09-19 PROCEDURE — 4010F PR ACE/ARB THEARPY RXD/TAKEN: ICD-10-PCS | Mod: CPTII,,, | Performed by: NURSE PRACTITIONER

## 2023-09-19 PROCEDURE — 1160F PR REVIEW ALL MEDS BY PRESCRIBER/CLIN PHARMACIST DOCUMENTED: ICD-10-PCS | Mod: CPTII,,, | Performed by: NURSE PRACTITIONER

## 2023-09-19 NOTE — ASSESSMENT & PLAN NOTE
Lipid panel results from 7/17/2023 were reviewed.    Trig 59  HDL 59  LDL 73  Lipitor 80 mg po daily  Lipids followed by PCP

## 2023-09-19 NOTE — ASSESSMENT & PLAN NOTE
Cramp like pain to BLE when walking less than 1/4 mile    Add CO Q 10  Daily to her regimen with her statin therapy  Schedule ABIs

## 2023-09-19 NOTE — PROGRESS NOTES
PCP: Imani Caldwell NP    Referring Provider:     Subjective:   Becki Mejia is a 63 y.o. female with hx of CAD s/p CABG 2017 for LM disease, HTN and HLD,  who presents for routine follow up. She states that she is doing well. She does have chronic, stable CARNES. Her only other issues is a cramp like pain to her BLE (calves) with ambulation of <1/4 mild c/w claudication. She is concerned that this is r/t her statin therapy. She has been on this dose for years.     3/13/2023 presents for routine follow up. She does not exercise but remains physically active and asymptomtic.         Fhx:  Family History   Problem Relation Age of Onset    Cancer Mother     Diabetes Mother     Hypertension Mother     Parkinsonism Father     Diabetes Father     Hypertension Father     Colon cancer Sister     Hypoglycemic Sister     Diabetes Paternal Grandmother      Shx:   Social History     Socioeconomic History    Marital status:      Spouse name: Harper    Number of children: 3   Occupational History     Comment: DISABILITY   Tobacco Use    Smoking status: Former     Current packs/day: 0.00     Average packs/day: 1 pack/day for 19.0 years (19.0 ttl pk-yrs)     Types: Cigarettes     Start date:      Quit date:      Years since quittin.7     Passive exposure: Past    Smokeless tobacco: Never    Tobacco comments:     smoked off and on   Substance and Sexual Activity    Alcohol use: Never    Drug use: Never    Sexual activity: Not Currently     Partners: Male       EKG 2023 RSR with HR 65 bpm; normal EKG  3/13/2023 RSR with HR 67 bpm; minimal voltage criteria for LVH  2022 RSR with HR 65 bpm; minim al voltage criteria for LVH    ECHO 2019  Mild concentric Lvh with normal VL size and systolic funciton, EF 45%  Trace MR with normal LA Size  Aortic valve sclerosis without stenosis. Normal aortic root size  Mild TR with o/w normal right heart chambers and valves. RVSP 265 mmHG + RAP  Since the study  of 9/27/2019 there is no significant change.    [facility] 10/29/2019  Severe single vessel CAD involving the prox LCX  Patent svg to the OM  Atrophic LIMA to the diagonal branch of the LAD  Normal LV size and systolic function, EF 55%  No significant MR  LVDD., LVEDP 18 mmHg        Lab Results   Component Value Date     07/17/2023    K 4.2 07/17/2023     (H) 07/17/2023    CO2 27 07/17/2023    BUN 11 07/17/2023    CREATININE 0.99 07/17/2023    CALCIUM 8.2 (L) 07/17/2023    ANIONGAP 6 (L) 07/17/2023    EGFRNONAA 64 04/18/2022       Lab Results   Component Value Date    CHOL 144 07/17/2023    CHOL 153 01/17/2023    CHOL 128 04/18/2022     Lab Results   Component Value Date    HDL 59 07/17/2023    HDL 56 01/17/2023    HDL 50 04/18/2022     Lab Results   Component Value Date    LDLCALC 73 07/17/2023    LDLCALC 80 01/17/2023    LDLCALC 66 04/18/2022     Lab Results   Component Value Date    TRIG 59 07/17/2023    TRIG 87 01/17/2023    TRIG 59 04/18/2022     Lab Results   Component Value Date    CHOLHDL 2.4 07/17/2023    CHOLHDL 2.7 01/17/2023    CHOLHDL 2.6 04/18/2022       Lab Results   Component Value Date    WBC 3.91 (L) 07/17/2023    HGB 11.7 (L) 07/17/2023    HCT 35.4 (L) 07/17/2023    MCV 90.5 07/17/2023     07/17/2023           Current Outpatient Medications:     allopurinoL (ZYLOPRIM) 100 MG tablet, TAKE 1 TABLET ONE TIME DAILY, Disp: 90 tablet, Rfl: 0    amLODIPine (NORVASC) 5 MG tablet, Take 1 tablet (5 mg total) by mouth daily as needed., Disp: 30 tablet, Rfl: 11    aspirin (ECOTRIN) 81 MG EC tablet, Take 81 mg by mouth once daily., Disp: , Rfl:     atorvastatin (LIPITOR) 80 MG tablet, Take 1 tablet (80 mg total) by mouth once daily., Disp: 90 tablet, Rfl: 1    benazepriL (LOTENSIN) 40 MG tablet, TAKE 1 TABLET EVERY NIGHT, Disp: 90 tablet, Rfl: 1    buPROPion (WELLBUTRIN XL) 150 MG TB24 tablet, TAKE 1 TABLET ONE TIME DAILY, Disp: 90 tablet, Rfl: 1    calcium-vitamin D 600 mg-10 mcg (400 unit) Tab,  TAKE 1 TABLET EVERY DAY, Disp: 90 tablet, Rfl: 1    carvediloL (COREG) 25 MG tablet, TAKE 1 TABLET TWICE DAILY, Disp: 180 tablet, Rfl: 1    diphenhydrAMINE (SOMINEX) 25 mg tablet, Take 25 mg by mouth nightly as needed for Insomnia., Disp: , Rfl:     FEROSUL 325 mg (65 mg iron) Tab tablet, TAKE 1 TABLET ONE TIME DAILY, Disp: 90 tablet, Rfl: 1    hydrOXYzine HCL (ATARAX) 25 MG tablet, TAKE 1 TABLET (25 MG TOTAL) BY MOUTH EVERY EVENING., Disp: 90 tablet, Rfl: 1    levothyroxine (SYNTHROID) 50 MCG tablet, TAKE 1 TABLET ONE TIME DAILY, Disp: 90 tablet, Rfl: 0    potassium chloride (MICRO-K) 10 MEQ CpSR, TAKE 1 CAPSULE ONE TIME DAILY, Disp: 90 capsule, Rfl: 1    spironolactone (ALDACTONE) 25 MG tablet, TAKE 1 TABLET ONE TIME DAILY, Disp: 90 tablet, Rfl: 1    topiramate (TOPAMAX) 50 MG tablet, TAKE 1 TABLET TWICE DAILY, Disp: 180 tablet, Rfl: 1    docusate sodium (COLACE) 100 MG capsule, Take 100 mg by mouth 2 (two) times daily., Disp: , Rfl:     loratadine (CLARITIN) 10 mg tablet, TAKE 1 TABLET EVERY DAY (Patient not taking: Reported on 9/19/2023), Disp: 90 tablet, Rfl: 1    LORazepam (ATIVAN) 0.5 MG tablet, Take 1 tablet (0.5 mg total) by mouth every 8 (eight) hours as needed for Anxiety (anxiety)., Disp: 21 tablet, Rfl: 0    predniSONE (DELTASONE) 5 MG tablet, Take three tablets daily for three days, take two tablets daily for three days, take one tablet daily for three days (Patient not taking: Reported on 7/19/2023), Disp: 18 tablet, Rfl: 0    vitamin D (VITAMIN D3) 1000 units Tab, Take 1 tablet (1,000 Units total) by mouth once daily. (Patient not taking: Reported on 9/19/2023), Disp: 90 tablet, Rfl: 1  Meds reviewed and reconciled.      Review of Systems   Respiratory:  Negative for shortness of breath.    Cardiovascular:  Negative for chest pain, palpitations, orthopnea, claudication, leg swelling and PND.   Neurological:  Negative for dizziness, loss of consciousness and weakness.           Objective:   /64  "(BP Location: Left arm, Patient Position: Sitting)   Pulse 74   Ht 5' 4" (1.626 m)   Wt 75 kg (165 lb 6.4 oz)   SpO2 97%   BMI 28.39 kg/m²     Physical Exam  Vitals and nursing note reviewed.   Constitutional:       Appearance: Normal appearance. She is normal weight.   HENT:      Head: Normocephalic and atraumatic.   Neck:      Vascular: No carotid bruit.   Cardiovascular:      Rate and Rhythm: Normal rate and regular rhythm.      Pulses: Normal pulses.      Heart sounds: Normal heart sounds.   Pulmonary:      Effort: Pulmonary effort is normal.      Breath sounds: Normal breath sounds.   Abdominal:      Palpations: Abdomen is soft.   Musculoskeletal:      Cervical back: Neck supple.      Right lower leg: No edema.      Left lower leg: No edema.   Skin:     General: Skin is warm and dry.      Capillary Refill: Capillary refill takes less than 2 seconds.   Neurological:      General: No focal deficit present.      Mental Status: She is alert.           Assessment:     1. Coronary artery disease, unspecified vessel or lesion type, unspecified whether angina present, unspecified whether native or transplanted heart  EKG 12-lead      2. Claudication  US Lower Extrem Arteries Bilat with MARLEN (xpd)      3. Coronary artery disease involving native coronary artery of native heart without angina pectoris        4. Essential hypertension        5. Hyperlipidemia, unspecified hyperlipidemia type        6. Bilateral claudication of lower limb              Plan:   Coronary artery disease involving native coronary artery of native heart  S/p CABG 8/31/2017 for LM disease  asymptomatic  Continue ASA/Lipitor    Essential hypertension  118/64 mmHg    Hyperlipidemia  Lipid panel results from 7/17/2023 were reviewed.    Trig 59  HDL 59  LDL 73  Lipitor 80 mg po daily  Lipids followed by PCP    Bilateral claudication of lower limb  Cramp like pain to BLE when walking less than 1/4 mile    Add CO Q 10  Daily to her regimen " with her statin therapy  Schedule ABIs    RTC: 6 months

## 2023-10-23 PROBLEM — Z00.00 ENCOUNTER FOR SUBSEQUENT ANNUAL WELLNESS VISIT (AWV) IN MEDICARE PATIENT: Status: RESOLVED | Noted: 2022-06-13 | Resolved: 2023-10-23

## 2023-10-24 ENCOUNTER — HOSPITAL ENCOUNTER (OUTPATIENT)
Dept: RADIOLOGY | Facility: HOSPITAL | Age: 63
Discharge: HOME | End: 2023-10-24
Attending: NURSE PRACTITIONER
Payer: MEDICARE

## 2023-10-24 DIAGNOSIS — I73.9 CLAUDICATION: ICD-10-CM

## 2023-10-24 PROCEDURE — 93922 US ARTERIAL LOWER EXTREMITY BILAT WITH ABI (XPD): ICD-10-PCS | Mod: 26,,, | Performed by: SURGERY

## 2023-10-24 PROCEDURE — 93922 UPR/L XTREMITY ART 2 LEVELS: CPT | Mod: 26,,, | Performed by: SURGERY

## 2023-10-24 PROCEDURE — 93925 US ARTERIAL LOWER EXTREMITY BILAT WITH ABI (XPD): ICD-10-PCS | Mod: 26,,, | Performed by: SURGERY

## 2023-10-24 PROCEDURE — 93925 LOWER EXTREMITY STUDY: CPT | Mod: TC

## 2023-10-24 PROCEDURE — 93925 LOWER EXTREMITY STUDY: CPT | Mod: 26,,, | Performed by: SURGERY

## 2023-10-26 ENCOUNTER — PATIENT MESSAGE (OUTPATIENT)
Dept: CARDIOLOGY | Facility: CLINIC | Age: 63
End: 2023-10-26
Payer: MEDICARE

## 2023-10-26 DIAGNOSIS — R93.6 ABNORMAL ULTRASOUND OF LOWER EXTREMITY: Primary | ICD-10-CM

## 2023-11-02 ENCOUNTER — PATIENT OUTREACH (OUTPATIENT)
Dept: ADMINISTRATIVE | Facility: HOSPITAL | Age: 63
End: 2023-11-02

## 2023-11-02 NOTE — PROGRESS NOTES
Health maintenance record review for population health care gaps      Human report no records to retrieve for A1c at this time

## 2023-11-16 ENCOUNTER — OFFICE VISIT (OUTPATIENT)
Dept: VASCULAR SURGERY | Facility: CLINIC | Age: 63
End: 2023-11-16
Payer: MEDICARE

## 2023-11-16 VITALS — HEIGHT: 64 IN | BODY MASS INDEX: 28.17 KG/M2 | WEIGHT: 165 LBS

## 2023-11-16 DIAGNOSIS — R93.6 ABNORMAL ULTRASOUND OF LOWER EXTREMITY: ICD-10-CM

## 2023-11-16 PROCEDURE — 3066F NEPHROPATHY DOC TX: CPT | Mod: CPTII,,, | Performed by: SURGERY

## 2023-11-16 PROCEDURE — 3061F NEG MICROALBUMINURIA REV: CPT | Mod: CPTII,,, | Performed by: SURGERY

## 2023-11-16 PROCEDURE — 3061F PR NEG MICROALBUMINURIA RESULT DOCUMENTED/REVIEW: ICD-10-PCS | Mod: CPTII,,, | Performed by: SURGERY

## 2023-11-16 PROCEDURE — 1159F MED LIST DOCD IN RCRD: CPT | Mod: CPTII,,, | Performed by: SURGERY

## 2023-11-16 PROCEDURE — 4010F PR ACE/ARB THEARPY RXD/TAKEN: ICD-10-PCS | Mod: CPTII,,, | Performed by: SURGERY

## 2023-11-16 PROCEDURE — 99214 OFFICE O/P EST MOD 30 MIN: CPT | Mod: PBBFAC | Performed by: SURGERY

## 2023-11-16 PROCEDURE — 99202 OFFICE O/P NEW SF 15 MIN: CPT | Mod: S$PBB,,, | Performed by: SURGERY

## 2023-11-16 PROCEDURE — 99202 PR OFFICE/OUTPT VISIT, NEW, LEVL II, 15-29 MIN: ICD-10-PCS | Mod: S$PBB,,, | Performed by: SURGERY

## 2023-11-16 PROCEDURE — 1159F PR MEDICATION LIST DOCUMENTED IN MEDICAL RECORD: ICD-10-PCS | Mod: CPTII,,, | Performed by: SURGERY

## 2023-11-16 PROCEDURE — 3008F PR BODY MASS INDEX (BMI) DOCUMENTED: ICD-10-PCS | Mod: CPTII,,, | Performed by: SURGERY

## 2023-11-16 PROCEDURE — 3008F BODY MASS INDEX DOCD: CPT | Mod: CPTII,,, | Performed by: SURGERY

## 2023-11-16 PROCEDURE — 1160F PR REVIEW ALL MEDS BY PRESCRIBER/CLIN PHARMACIST DOCUMENTED: ICD-10-PCS | Mod: CPTII,,, | Performed by: SURGERY

## 2023-11-16 PROCEDURE — 3066F PR DOCUMENTATION OF TREATMENT FOR NEPHROPATHY: ICD-10-PCS | Mod: CPTII,,, | Performed by: SURGERY

## 2023-11-16 PROCEDURE — 1160F RVW MEDS BY RX/DR IN RCRD: CPT | Mod: CPTII,,, | Performed by: SURGERY

## 2023-11-16 PROCEDURE — 4010F ACE/ARB THERAPY RXD/TAKEN: CPT | Mod: CPTII,,, | Performed by: SURGERY

## 2023-11-29 ENCOUNTER — OFFICE VISIT (OUTPATIENT)
Dept: FAMILY MEDICINE | Facility: CLINIC | Age: 63
End: 2023-11-29
Payer: MEDICARE

## 2023-11-29 VITALS
RESPIRATION RATE: 20 BRPM | DIASTOLIC BLOOD PRESSURE: 60 MMHG | SYSTOLIC BLOOD PRESSURE: 119 MMHG | BODY MASS INDEX: 29.47 KG/M2 | TEMPERATURE: 99 F | OXYGEN SATURATION: 96 % | HEIGHT: 64 IN | HEART RATE: 73 BPM | WEIGHT: 172.63 LBS

## 2023-11-29 DIAGNOSIS — U07.1 COVID: ICD-10-CM

## 2023-11-29 DIAGNOSIS — R51.9 ACUTE NONINTRACTABLE HEADACHE, UNSPECIFIED HEADACHE TYPE: ICD-10-CM

## 2023-11-29 DIAGNOSIS — R05.8 DRY COUGH: Primary | ICD-10-CM

## 2023-11-29 DIAGNOSIS — R09.81 NASAL CONGESTION: ICD-10-CM

## 2023-11-29 LAB
CTP QC/QA: YES
CTP QC/QA: YES
FLUAV AG NPH QL: NEGATIVE
FLUBV AG NPH QL: NEGATIVE
SARS-COV-2 AG RESP QL IA.RAPID: POSITIVE

## 2023-11-29 PROCEDURE — 1159F PR MEDICATION LIST DOCUMENTED IN MEDICAL RECORD: ICD-10-PCS | Mod: ,,, | Performed by: FAMILY MEDICINE

## 2023-11-29 PROCEDURE — 99213 OFFICE O/P EST LOW 20 MIN: CPT | Mod: ,,, | Performed by: FAMILY MEDICINE

## 2023-11-29 PROCEDURE — 87804 POCT INFLUENZA A/B: ICD-10-PCS | Mod: 59,QW,, | Performed by: FAMILY MEDICINE

## 2023-11-29 PROCEDURE — 4010F PR ACE/ARB THEARPY RXD/TAKEN: ICD-10-PCS | Mod: ,,, | Performed by: FAMILY MEDICINE

## 2023-11-29 PROCEDURE — 4010F ACE/ARB THERAPY RXD/TAKEN: CPT | Mod: ,,, | Performed by: FAMILY MEDICINE

## 2023-11-29 PROCEDURE — 3061F NEG MICROALBUMINURIA REV: CPT | Mod: ,,, | Performed by: FAMILY MEDICINE

## 2023-11-29 PROCEDURE — 1159F MED LIST DOCD IN RCRD: CPT | Mod: ,,, | Performed by: FAMILY MEDICINE

## 2023-11-29 PROCEDURE — 3008F BODY MASS INDEX DOCD: CPT | Mod: ,,, | Performed by: FAMILY MEDICINE

## 2023-11-29 PROCEDURE — 3008F PR BODY MASS INDEX (BMI) DOCUMENTED: ICD-10-PCS | Mod: ,,, | Performed by: FAMILY MEDICINE

## 2023-11-29 PROCEDURE — 3078F DIAST BP <80 MM HG: CPT | Mod: ,,, | Performed by: FAMILY MEDICINE

## 2023-11-29 PROCEDURE — 87804 INFLUENZA ASSAY W/OPTIC: CPT | Mod: 59,QW,, | Performed by: FAMILY MEDICINE

## 2023-11-29 PROCEDURE — 3078F PR MOST RECENT DIASTOLIC BLOOD PRESSURE < 80 MM HG: ICD-10-PCS | Mod: ,,, | Performed by: FAMILY MEDICINE

## 2023-11-29 PROCEDURE — 3074F SYST BP LT 130 MM HG: CPT | Mod: ,,, | Performed by: FAMILY MEDICINE

## 2023-11-29 PROCEDURE — 99213 PR OFFICE/OUTPT VISIT, EST, LEVL III, 20-29 MIN: ICD-10-PCS | Mod: ,,, | Performed by: FAMILY MEDICINE

## 2023-11-29 PROCEDURE — 87426 SARSCOV CORONAVIRUS AG IA: CPT | Mod: QW,,, | Performed by: FAMILY MEDICINE

## 2023-11-29 PROCEDURE — 87426 SARS CORONAVIRUS 2 ANTIGEN POCT: ICD-10-PCS | Mod: QW,,, | Performed by: FAMILY MEDICINE

## 2023-11-29 PROCEDURE — 3066F PR DOCUMENTATION OF TREATMENT FOR NEPHROPATHY: ICD-10-PCS | Mod: ,,, | Performed by: FAMILY MEDICINE

## 2023-11-29 PROCEDURE — 3074F PR MOST RECENT SYSTOLIC BLOOD PRESSURE < 130 MM HG: ICD-10-PCS | Mod: ,,, | Performed by: FAMILY MEDICINE

## 2023-11-29 PROCEDURE — 3061F PR NEG MICROALBUMINURIA RESULT DOCUMENTED/REVIEW: ICD-10-PCS | Mod: ,,, | Performed by: FAMILY MEDICINE

## 2023-11-29 PROCEDURE — 3066F NEPHROPATHY DOC TX: CPT | Mod: ,,, | Performed by: FAMILY MEDICINE

## 2023-11-29 RX ORDER — NIRMATRELVIR AND RITONAVIR 300-100 MG
KIT ORAL
Qty: 30 TABLET | Refills: 0 | Status: SHIPPED | OUTPATIENT
Start: 2023-11-29 | End: 2023-12-04

## 2023-11-29 RX ORDER — BENZONATATE 100 MG/1
100 CAPSULE ORAL 3 TIMES DAILY PRN
Qty: 30 CAPSULE | Refills: 1 | Status: SHIPPED | OUTPATIENT
Start: 2023-11-29 | End: 2023-12-19

## 2023-11-29 RX ORDER — IPRATROPIUM BROMIDE 21 UG/1
2 SPRAY, METERED NASAL 3 TIMES DAILY
Qty: 30 ML | Refills: 1 | Status: SHIPPED | OUTPATIENT
Start: 2023-11-29 | End: 2024-01-18

## 2023-11-29 RX ORDER — FLUTICASONE PROPIONATE 50 MCG
2 SPRAY, SUSPENSION (ML) NASAL DAILY
Qty: 16 G | Refills: 0 | Status: SHIPPED | OUTPATIENT
Start: 2023-11-29 | End: 2024-01-18

## 2023-11-29 NOTE — PROGRESS NOTES
Kody Fonseca DO   [facility]  [address] [address] [address]  [phone]      PATIENT NAME: Becki Mejia  : 1960  DATE: 23  MRN: [id number]      Billing Provider: Kody Fonseca DO  Level of Service: MA OFFICE/OUTPT VISIT, EST, LEVL III, 20-29 MIN  Patient PCP Information       Provider PCP Type    Kody Fonseca DO General            Reason for Visit / Chief Complaint: Nasal Congestion (Becki Mejia 63 year old female presents withrunny/ congested nose.Onset . OTC medication includes Corecedin HBP taken at 9 pm last night and Tylenol. She is not resting well due to her sinuses. No nausea,vomiting, diarrhea, otalgia or sore throat. Sinus drainage is clear. ), Fever (Fever began this morning. Tylenol taken at 3:15 am), Hypertension (Reports her blood pressure has been up for the last couple of days. Even when se was not running fever. She would like to discuss this.), Cough (Dry hacking cough. Onset early this morning.), and Headache (Headache constant since .)       Update PCP  Update Chief Complaint         History of Present Illness / Problem Focused Workflow     Becki Mejia presents to the clinic with Nasal Congestion (Becki Mejia 63 year old female presents withrunny/ congested nose.Onset . OTC medication includes Corecedin HBP taken at 9 pm last night and Tylenol. She is not resting well due to her sinuses. No nausea,vomiting, diarrhea, otalgia or sore throat. Sinus drainage is clear. ), Fever (Fever began this morning. Tylenol taken at 3:15 am), Hypertension (Reports her blood pressure has been up for the last couple of days. Even when se was not running fever. She would like to discuss this.), Cough (Dry hacking cough. Onset early this morning.), and Headache (Headache constant since .)     HPI as noted.  Patient is a 63-year-old female presenting with several days of URI symptoms.   Patient denies chest pain, shortness of breath, palpitations, diaphoresis, dizziness and lightheadedness.    Fever   Associated symptoms include coughing and headaches. Pertinent negatives include no abdominal pain, chest pain, congestion, diarrhea, nausea, rash, sore throat, urinary pain or vomiting.   Hypertension  Associated symptoms include headaches. Pertinent negatives include no chest pain, palpitations or shortness of breath.   Cough  Associated symptoms include a fever and headaches. Pertinent negatives include no chest pain, chills, rash, sore throat or shortness of breath.   Headache   Associated symptoms include coughing and a fever. Pertinent negatives include no abdominal pain, dizziness, nausea, sore throat or vomiting.       Review of Systems     Review of Systems   Constitutional:  Positive for fever. Negative for chills and diaphoresis.   HENT:  Negative for congestion and sore throat.    Respiratory:  Positive for cough. Negative for shortness of breath.    Cardiovascular:  Negative for chest pain and palpitations.   Gastrointestinal:  Negative for abdominal pain, constipation, diarrhea, nausea and vomiting.   Genitourinary:  Negative for dysuria and hematuria.   Skin:  Negative for rash.   Neurological:  Positive for headaches. Negative for dizziness and light-headedness.       Medical / Social / Family History     Past Medical History:   Diagnosis Date    Depression     Fibromyalgia        Past Surgical History:   Procedure Laterality Date    APPENDECTOMY      CARDIAC SURGERY      CORONARY ARTERY BYPASS GRAFT      HYSTERECTOMY      TOTAL    OOPHORECTOMY      SPINE SURGERY      TONSILLECTOMY         Social History  Ms. Mejia  reports that she quit smoking about 16 years ago. Her smoking use included cigarettes. She started smoking about 35 years ago. She has a 19.0 pack-year smoking history. She has been exposed to tobacco smoke. She has never used smokeless tobacco. She reports that she does  not drink alcohol and does not use drugs.    Family History  Ms.'s Mejia family history includes Cancer in her mother; Colon cancer in her sister; Diabetes in her father, mother, and paternal grandmother; Hypertension in her father and mother; Hypoglycemic in her sister; Parkinsonism in her father.    Medications and Allergies     Medications  Outpatient Medications Marked as Taking for the 11/29/23 encounter (Office Visit) with Kody Fonseca, DO   Medication Sig Dispense Refill    allopurinoL (ZYLOPRIM) 100 MG tablet TAKE 1 TABLET ONE TIME DAILY 90 tablet 0    amLODIPine (NORVASC) 5 MG tablet Take 1 tablet (5 mg total) by mouth daily as needed. 30 tablet 11    aspirin (ECOTRIN) 81 MG EC tablet Take 81 mg by mouth once daily.      atorvastatin (LIPITOR) 80 MG tablet Take 1 tablet (80 mg total) by mouth once daily. 90 tablet 1    benazepriL (LOTENSIN) 40 MG tablet TAKE 1 TABLET EVERY NIGHT 90 tablet 1    buPROPion (WELLBUTRIN XL) 150 MG TB24 tablet TAKE 1 TABLET ONE TIME DAILY 90 tablet 1    calcium-vitamin D 600 mg-10 mcg (400 unit) Tab TAKE 1 TABLET EVERY DAY 90 tablet 1    carvediloL (COREG) 25 MG tablet TAKE 1 TABLET TWICE DAILY 180 tablet 1    diphenhydrAMINE (SOMINEX) 25 mg tablet Take 25 mg by mouth nightly as needed for Insomnia.      FEROSUL 325 mg (65 mg iron) Tab tablet TAKE 1 TABLET ONE TIME DAILY 90 tablet 1    hydrOXYzine HCL (ATARAX) 25 MG tablet TAKE 1 TABLET (25 MG TOTAL) BY MOUTH EVERY EVENING. 90 tablet 1    levothyroxine (SYNTHROID) 50 MCG tablet TAKE 1 TABLET ONE TIME DAILY 90 tablet 0    potassium chloride (MICRO-K) 10 MEQ CpSR TAKE 1 CAPSULE ONE TIME DAILY 90 capsule 1    spironolactone (ALDACTONE) 25 MG tablet TAKE 1 TABLET ONE TIME DAILY 90 tablet 1    topiramate (TOPAMAX) 50 MG tablet TAKE 1 TABLET TWICE DAILY 180 tablet 1    vitamin D (VITAMIN D3) 1000 units Tab Take 1 tablet (1,000 Units total) by mouth once daily. 90 tablet 1       Allergies  Review of patient's allergies indicates:    Allergen Reactions    Codeine Swelling    Codeine phosphate Other (See Comments)    Tetanus and diphtheria toxoids Itching    Tetanus immune globulin Other (See Comments)       Physical Examination     Vitals:    11/29/23 0910   BP: 119/60   Pulse: 73   Resp: 20   Temp: 99.2 °F (37.3 °C)     Physical Exam  Vitals and nursing note reviewed.   Constitutional:       General: She is not in acute distress.     Appearance: She is ill-appearing. She is not toxic-appearing or diaphoretic.   HENT:      Head: Normocephalic and atraumatic.      Right Ear: External ear normal.      Left Ear: External ear normal.      Nose: Congestion present. No rhinorrhea.      Mouth/Throat:      Mouth: Mucous membranes are moist.      Pharynx: No oropharyngeal exudate or posterior oropharyngeal erythema.   Eyes:      General: No scleral icterus.        Right eye: No discharge.         Left eye: No discharge.      Extraocular Movements: Extraocular movements intact.      Conjunctiva/sclera: Conjunctivae normal.      Pupils: Pupils are equal, round, and reactive to light.   Neck:      Vascular: No carotid bruit.   Cardiovascular:      Rate and Rhythm: Normal rate and regular rhythm.      Heart sounds: No murmur heard.     No friction rub. No gallop.   Pulmonary:      Effort: No respiratory distress.      Breath sounds: No stridor. No wheezing, rhonchi or rales.   Abdominal:      General: Abdomen is flat.      Tenderness: There is no abdominal tenderness. There is no guarding.   Musculoskeletal:         General: No swelling.      Right lower leg: No edema.      Left lower leg: No edema.   Skin:     General: Skin is warm and dry.      Coloration: Skin is not jaundiced.   Neurological:      Mental Status: She is alert and oriented to person, place, and time.         Assessment and Plan (including Health Maintenance)      Problem List  Smart Sets  Document Outside HM   :    Plan:         Health Maintenance Due   Topic Date Due    Hepatitis C  Screening  Never done    HIV Screening  Never done    Hemoglobin A1c (Diabetic Prevention Screening)  Never done    RSV Vaccine (Age 60+ and Pregnant patients) (1 - 1-dose 60+ series) Never done    Shingles Vaccine (2 of 2) 10/07/2022    COVID-19 Vaccine (5 - 2023-24 season) 09/01/2023    Mammogram  02/22/2024       Problem List Items Addressed This Visit          ID    COVID    Current Assessment & Plan     Patient is COVID positive.  Given her age and multiple comorbidities, Paxlovid has been prescribed.  Patient also given medication for symptomatic relief.  Patient advised to take vitamin-D, vitamin-C and zinc as the supplements may help with disease course..  Patient informed that she may be contagious for 10 days after symptom onset.  Patient advised at length to call, return to clinic or present to the ED should symptoms persist or worsen.  Follow up as needed.  Patient understands and agrees with plan of care.         Relevant Medications    nirmatrelvir-ritonavir (PAXLOVID) 300 mg (150 mg x 2)-100 mg copackaged tablets (EUA)     Other Visit Diagnoses       Dry cough    -  Primary    Relevant Orders    POCT Influenza A/B Rapid Antigen (Completed)    SARS Coronavirus 2 Antigen, POCT (Completed)    Nasal congestion        Relevant Medications    fluticasone propionate (FLONASE) 50 mcg/actuation nasal spray    Other Relevant Orders    POCT Influenza A/B Rapid Antigen (Completed)    SARS Coronavirus 2 Antigen, POCT (Completed)    Acute nonintractable headache, unspecified headache type        Relevant Orders    POCT Influenza A/B Rapid Antigen (Completed)    SARS Coronavirus 2 Antigen, POCT (Completed)            Health Maintenance Topics with due status: Not Due       Topic Last Completion Date    DEXA Scan 07/15/2022    Colorectal Cancer Screening 03/24/2023    Lipid Panel 07/17/2023    Aspirin/Antiplatelet Therapy 11/29/2023       Future Appointments   Date Time Provider Department Center   2/15/2024  1:15 PM  Kimberly Rome MD OBC [facility]   2/28/2024  1:00 PM [facility] MAMMO1 OB [facility]   3/19/2024  9:00 AM Pari Kirk ACNP OBC CARD [facility]   7/24/2024  8:00 AM AWV NURSE, [facility] FAMILY MEDICINE [facility]            Signature:  Kody Fonseca,   [facility] [address] [place] [address]  [phone]    Date of encounter: 11/29/23

## 2023-11-29 NOTE — ASSESSMENT & PLAN NOTE
Patient is COVID positive.  Given her age and multiple comorbidities, Paxlovid has been prescribed.  Patient also given medication for symptomatic relief.  Patient advised to take vitamin-D, vitamin-C and zinc as the supplements may help with disease course..  Patient informed that she may be contagious for 10 days after symptom onset.  Patient advised at length to call, return to clinic or present to the ED should symptoms persist or worsen.  Follow up as needed.  Patient understands and agrees with plan of care.

## 2023-12-01 NOTE — PROGRESS NOTES
"Subjective:       Patient ID: Becki Mejia is a 63 y.o. female.    Chief Complaint: No chief complaint on file.  New patient presents he is had an ultrasound mentioned some abnormal waveforms in her legs no ABIs were included no wounds no tissue loss nonsmoker  family history includes Cancer in her mother; Colon cancer in her sister; Diabetes in her father, mother, and paternal grandmother; Hypertension in her father and mother; Hypoglycemic in her sister; Parkinsonism in her father.  Past Medical History:   Diagnosis Date    Depression     Fibromyalgia       Past Surgical History:   Procedure Laterality Date    APPENDECTOMY      CARDIAC SURGERY      CORONARY ARTERY BYPASS GRAFT      HYSTERECTOMY      TOTAL    OOPHORECTOMY      SPINE SURGERY      TONSILLECTOMY         reports that she quit smoking about 16 years ago. Her smoking use included cigarettes. She started smoking about 35 years ago. She has a 19.0 pack-year smoking history. She has been exposed to tobacco smoke. She has never used smokeless tobacco. She reports that she does not drink alcohol and does not use drugs.   HPI  Review of Systems      Objective:      Ht 5' 4" (1.626 m)   Wt 74.8 kg (165 lb)   BMI 28.32 kg/m²    Physical Exam  Constitutional:       Appearance: Normal appearance.   Cardiovascular:      Rate and Rhythm: Normal rate.   Skin:     General: Skin is warm and dry.      Capillary Refill: Capillary refill takes less than 2 seconds.   Neurological:      General: No focal deficit present.      Mental Status: She is alert.   Psychiatric:         Mood and Affect: Mood normal.         Behavior: Behavior normal.         Thought Content: Thought content normal.         Judgment: Judgment normal.           Assessment:       1. Abnormal ultrasound of lower extremity        Plan:       Due to abnormal ultrasound we will get ABIs with without exercise follow-up after above      "

## 2024-01-02 RX ORDER — BENZONATATE 100 MG/1
100 CAPSULE ORAL
Qty: 30 CAPSULE | Refills: 1 | OUTPATIENT
Start: 2024-01-02

## 2024-01-09 DIAGNOSIS — Z71.89 COMPLEX CARE COORDINATION: ICD-10-CM

## 2024-01-18 ENCOUNTER — OFFICE VISIT (OUTPATIENT)
Dept: FAMILY MEDICINE | Facility: CLINIC | Age: 64
End: 2024-01-18
Payer: MEDICARE

## 2024-01-18 VITALS
HEART RATE: 55 BPM | DIASTOLIC BLOOD PRESSURE: 76 MMHG | WEIGHT: 171.19 LBS | TEMPERATURE: 98 F | BODY MASS INDEX: 29.23 KG/M2 | HEIGHT: 64 IN | RESPIRATION RATE: 16 BRPM | OXYGEN SATURATION: 97 % | SYSTOLIC BLOOD PRESSURE: 138 MMHG

## 2024-01-18 DIAGNOSIS — F32.A DEPRESSION, UNSPECIFIED DEPRESSION TYPE: ICD-10-CM

## 2024-01-18 DIAGNOSIS — E03.9 HYPOTHYROIDISM, UNSPECIFIED TYPE: ICD-10-CM

## 2024-01-18 DIAGNOSIS — Z13.1 ENCOUNTER FOR SCREENING FOR DIABETES MELLITUS: ICD-10-CM

## 2024-01-18 DIAGNOSIS — R07.9 CHEST PAIN, UNSPECIFIED TYPE: ICD-10-CM

## 2024-01-18 DIAGNOSIS — I10 ESSENTIAL HYPERTENSION: Primary | ICD-10-CM

## 2024-01-18 DIAGNOSIS — Z79.899 OTHER LONG TERM (CURRENT) DRUG THERAPY: ICD-10-CM

## 2024-01-18 DIAGNOSIS — R79.9 ABNORMAL FINDING OF BLOOD CHEMISTRY, UNSPECIFIED: ICD-10-CM

## 2024-01-18 DIAGNOSIS — F41.9 ANXIETY: ICD-10-CM

## 2024-01-18 DIAGNOSIS — E78.5 HYPERLIPIDEMIA, UNSPECIFIED HYPERLIPIDEMIA TYPE: ICD-10-CM

## 2024-01-18 LAB
ALBUMIN SERPL BCP-MCNC: 3.8 G/DL (ref 3.5–5)
ALBUMIN/GLOB SERPL: 1.3 {RATIO}
ALP SERPL-CCNC: 51 U/L (ref 50–130)
ALT SERPL W P-5'-P-CCNC: 21 U/L (ref 13–56)
ANION GAP SERPL CALCULATED.3IONS-SCNC: 11 MMOL/L (ref 7–16)
AST SERPL W P-5'-P-CCNC: 19 U/L (ref 15–37)
BASOPHILS # BLD AUTO: 0.03 K/UL (ref 0–0.2)
BASOPHILS NFR BLD AUTO: 0.7 % (ref 0–1)
BILIRUB SERPL-MCNC: 0.6 MG/DL (ref ?–1.2)
BUN SERPL-MCNC: 19 MG/DL (ref 7–18)
BUN/CREAT SERPL: 20 (ref 6–20)
CALCIUM SERPL-MCNC: 8.5 MG/DL (ref 8.5–10.1)
CHLORIDE SERPL-SCNC: 104 MMOL/L (ref 98–107)
CHOLEST SERPL-MCNC: 160 MG/DL (ref 0–200)
CHOLEST/HDLC SERPL: 2.6 {RATIO}
CK SERPL-CCNC: 96 U/L (ref 26–192)
CO2 SERPL-SCNC: 26 MMOL/L (ref 21–32)
CREAT SERPL-MCNC: 0.95 MG/DL (ref 0.55–1.02)
CTP QC/QA: YES
DIFFERENTIAL METHOD BLD: ABNORMAL
EGFR (NO RACE VARIABLE) (RUSH/TITUS): 67 ML/MIN/1.73M2
EOSINOPHIL # BLD AUTO: 0.04 K/UL (ref 0–0.5)
EOSINOPHIL NFR BLD AUTO: 0.9 % (ref 1–4)
ERYTHROCYTE [DISTWIDTH] IN BLOOD BY AUTOMATED COUNT: 13.5 % (ref 11.5–14.5)
EST. AVERAGE GLUCOSE BLD GHB EST-MCNC: 105 MG/DL
GLOBULIN SER-MCNC: 3 G/DL (ref 2–4)
GLUCOSE SERPL-MCNC: 116 MG/DL (ref 74–106)
HBA1C MFR BLD HPLC: 5.3 % (ref 4.5–6.6)
HCT VFR BLD AUTO: 35.3 % (ref 38–47)
HDLC SERPL-MCNC: 62 MG/DL (ref 40–60)
HGB BLD-MCNC: 11.5 G/DL (ref 12–16)
IMM GRANULOCYTES # BLD AUTO: 0.01 K/UL (ref 0–0.04)
IMM GRANULOCYTES NFR BLD: 0.2 % (ref 0–0.4)
LDLC SERPL CALC-MCNC: 86 MG/DL
LDLC/HDLC SERPL: 1.4 {RATIO}
LYMPHOCYTES # BLD AUTO: 1.81 K/UL (ref 1–4.8)
LYMPHOCYTES NFR BLD AUTO: 39.3 % (ref 27–41)
MCH RBC QN AUTO: 30.2 PG (ref 27–31)
MCHC RBC AUTO-ENTMCNC: 32.6 G/DL (ref 32–36)
MCV RBC AUTO: 92.7 FL (ref 80–96)
MONOCYTES # BLD AUTO: 0.4 K/UL (ref 0–0.8)
MONOCYTES NFR BLD AUTO: 8.7 % (ref 2–6)
MPC BLD CALC-MCNC: 11.1 FL (ref 9.4–12.4)
NEUTROPHILS # BLD AUTO: 2.31 K/UL (ref 1.8–7.7)
NEUTROPHILS NFR BLD AUTO: 50.2 % (ref 53–65)
NONHDLC SERPL-MCNC: 98 MG/DL
NRBC # BLD AUTO: 0 X10E3/UL
NRBC, AUTO (.00): 0 %
NT-PROBNP SERPL-MCNC: 519 PG/ML (ref 1–125)
PLATELET # BLD AUTO: 148 K/UL (ref 150–400)
POC (AMP) AMPHETAMINE: NEGATIVE
POC (BAR) BARBITURATES: NEGATIVE
POC (BUP) BUPRENORPHINE: NEGATIVE
POC (BZO) BENZODIAZEPINES: NEGATIVE
POC (COC) COCAINE: NEGATIVE
POC (MDMA) METHYLENEDIOXYMETHAMPHETAMINE 3,4: NEGATIVE
POC (MET) METHAMPHETAMINE: NEGATIVE
POC (MOP) OPIATES: NEGATIVE
POC (MTD) METHADONE: NEGATIVE
POC (OXY) OXYCODONE: NEGATIVE
POC (PCP) PHENCYCLIDINE: NEGATIVE
POC (TCA) TRICYCLIC ANTIDEPRESSANTS: NEGATIVE
POC TEMPERATURE (URINE): 92
POC THC: NEGATIVE
POTASSIUM SERPL-SCNC: 4.5 MMOL/L (ref 3.5–5.1)
PROT SERPL-MCNC: 6.8 G/DL (ref 6.4–8.2)
RBC # BLD AUTO: 3.81 M/UL (ref 4.2–5.4)
SODIUM SERPL-SCNC: 136 MMOL/L (ref 136–145)
TRIGL SERPL-MCNC: 61 MG/DL (ref 35–150)
TSH SERPL DL<=0.005 MIU/L-ACNC: 0.73 UIU/ML (ref 0.36–3.74)
VLDLC SERPL-MCNC: 12 MG/DL
WBC # BLD AUTO: 4.6 K/UL (ref 4.5–11)

## 2024-01-18 PROCEDURE — 36415 COLL VENOUS BLD VENIPUNCTURE: CPT

## 2024-01-18 PROCEDURE — 99214 OFFICE O/P EST MOD 30 MIN: CPT | Mod: ,,,

## 2024-01-18 PROCEDURE — 1159F MED LIST DOCD IN RCRD: CPT | Mod: ,,,

## 2024-01-18 PROCEDURE — 3008F BODY MASS INDEX DOCD: CPT | Mod: ,,,

## 2024-01-18 PROCEDURE — 80061 LIPID PANEL: CPT | Mod: ,,, | Performed by: CLINICAL MEDICAL LABORATORY

## 2024-01-18 PROCEDURE — 80050 GENERAL HEALTH PANEL: CPT | Mod: ,,, | Performed by: CLINICAL MEDICAL LABORATORY

## 2024-01-18 PROCEDURE — 82550 ASSAY OF CK (CPK): CPT | Mod: ,,, | Performed by: CLINICAL MEDICAL LABORATORY

## 2024-01-18 PROCEDURE — 1160F RVW MEDS BY RX/DR IN RCRD: CPT | Mod: ,,,

## 2024-01-18 PROCEDURE — 3075F SYST BP GE 130 - 139MM HG: CPT | Mod: ,,,

## 2024-01-18 PROCEDURE — 83036 HEMOGLOBIN GLYCOSYLATED A1C: CPT | Mod: ,,, | Performed by: CLINICAL MEDICAL LABORATORY

## 2024-01-18 PROCEDURE — 4010F ACE/ARB THERAPY RXD/TAKEN: CPT | Mod: ,,,

## 2024-01-18 PROCEDURE — 83880 ASSAY OF NATRIURETIC PEPTIDE: CPT | Mod: ,,, | Performed by: CLINICAL MEDICAL LABORATORY

## 2024-01-18 PROCEDURE — 80305 DRUG TEST PRSMV DIR OPT OBS: CPT | Mod: QW,,,

## 2024-01-18 PROCEDURE — 3078F DIAST BP <80 MM HG: CPT | Mod: ,,,

## 2024-01-18 PROCEDURE — 3044F HG A1C LEVEL LT 7.0%: CPT | Mod: ,,,

## 2024-01-18 RX ORDER — TOPIRAMATE 50 MG/1
50 TABLET, FILM COATED ORAL 2 TIMES DAILY
Qty: 180 TABLET | Refills: 1 | Status: SHIPPED | OUTPATIENT
Start: 2024-01-18

## 2024-01-18 RX ORDER — ALLOPURINOL 100 MG/1
100 TABLET ORAL DAILY
Qty: 90 TABLET | Refills: 1 | Status: SHIPPED | OUTPATIENT
Start: 2024-01-18

## 2024-01-18 RX ORDER — NITROGLYCERIN 0.4 MG/1
0.4 TABLET SUBLINGUAL EVERY 5 MIN PRN
Qty: 30 TABLET | Refills: 0 | Status: SHIPPED | OUTPATIENT
Start: 2024-01-18

## 2024-01-18 RX ORDER — CARVEDILOL 25 MG/1
25 TABLET ORAL 2 TIMES DAILY
Qty: 180 TABLET | Refills: 1 | Status: SHIPPED | OUTPATIENT
Start: 2024-01-18

## 2024-01-18 RX ORDER — SPIRONOLACTONE 25 MG/1
25 TABLET ORAL DAILY
Qty: 90 TABLET | Refills: 1 | Status: SHIPPED | OUTPATIENT
Start: 2024-01-18

## 2024-01-18 RX ORDER — LORAZEPAM 0.5 MG/1
0.5 TABLET ORAL EVERY 8 HOURS PRN
Qty: 21 TABLET | Refills: 0 | Status: CANCELLED | OUTPATIENT
Start: 2024-01-18 | End: 2024-02-17

## 2024-01-18 RX ORDER — POTASSIUM CHLORIDE 750 MG/1
10 CAPSULE, EXTENDED RELEASE ORAL DAILY
Qty: 90 CAPSULE | Refills: 1 | Status: SHIPPED | OUTPATIENT
Start: 2024-01-18

## 2024-01-18 RX ORDER — AMLODIPINE BESYLATE 5 MG/1
5 TABLET ORAL DAILY PRN
Qty: 30 TABLET | Refills: 2 | Status: SHIPPED | OUTPATIENT
Start: 2024-01-18 | End: 2024-04-04 | Stop reason: SDUPTHER

## 2024-01-18 RX ORDER — TIZANIDINE 4 MG/1
4 TABLET ORAL EVERY 6 HOURS PRN
COMMUNITY

## 2024-01-18 RX ORDER — BENAZEPRIL HYDROCHLORIDE 40 MG/1
40 TABLET ORAL NIGHTLY
Qty: 90 TABLET | Refills: 1 | Status: SHIPPED | OUTPATIENT
Start: 2024-01-18

## 2024-01-18 RX ORDER — LEVOTHYROXINE SODIUM 50 UG/1
50 TABLET ORAL
Qty: 90 TABLET | Refills: 1 | Status: SHIPPED | OUTPATIENT
Start: 2024-01-18

## 2024-01-18 RX ORDER — BUPROPION HYDROCHLORIDE 150 MG/1
150 TABLET ORAL DAILY
Qty: 90 TABLET | Refills: 1 | Status: SHIPPED | OUTPATIENT
Start: 2024-01-18

## 2024-01-18 RX ORDER — NITROGLYCERIN 0.4 MG/1
0.4 TABLET SUBLINGUAL EVERY 5 MIN PRN
COMMUNITY
End: 2024-01-18 | Stop reason: SDUPTHER

## 2024-01-18 RX ORDER — ATORVASTATIN CALCIUM 80 MG/1
80 TABLET, FILM COATED ORAL DAILY
Qty: 90 TABLET | Refills: 1 | Status: SHIPPED | OUTPATIENT
Start: 2024-01-18

## 2024-01-18 NOTE — PROGRESS NOTES
ASHA AGUILAR   [place] [address]  [place]  [address]      PATIENT NAME: Becki Mejia  : 1960  DATE: 24  MRN: [id number]        Reason for Visit / Chief Complaint: Establish Care (Patient is here to establish care and needing refills. She had been seeing ASHA Pierre at the [facility]. ), Hypertension (Routine visit with labs and refills. ), Hyperlipidemia (Routine visit with labs and refills. ), Hypothyroidism (Routine visit with labs and refills. ), Depression (Routine visit with labs and refills. ), and Fibromyalgia (Routine visit with labs and refills. )         History of Present Illness / Problem Focused Workflow     64 y/o female presents to clinic to Establish Care (Patient is here to establish care and needing refills. She had been seeing ASHA Pierre at the [facility]. ), Hypertension (Routine visit with labs and refills. ), Hyperlipidemia (Routine visit with labs and refills. ), Hypothyroidism (Routine visit with labs and refills. ), Depression (Routine visit with labs and refills. ), and Fibromyalgia (Routine visit with labs and refills. )      Review of Systems     @Review of Systems   Constitutional:  Negative for chills, fatigue and fever.   HENT:  Negative for nasal congestion, ear discharge, ear pain, rhinorrhea, sinus pressure/congestion and sore throat.    Respiratory:  Negative for cough, chest tightness, shortness of breath, wheezing and stridor.    Cardiovascular:  Negative for palpitations and claudication.   Gastrointestinal:  Negative for abdominal pain, constipation, diarrhea, nausea, vomiting and reflux.   Genitourinary:  Negative for dysuria, flank pain, frequency, hematuria and urgency.   Musculoskeletal:  Negative for myalgias.   Neurological:  Negative for dizziness, weakness, light-headedness and headaches.   Psychiatric/Behavioral:  Negative for suicidal ideas.        Medical / Social / Family History     Past  Medical History:   Diagnosis Date    Coronary artery disease     Depression     Diverticulosis     Fibromyalgia     Fibromyalgia     Heart disease     Hyperlipidemia     Hypertension     Hypothyroidism     Mass of adrenal gland     Osteopenia     Ventral hernia        Past Surgical History:   Procedure Laterality Date    ADENOIDECTOMY      ANKLE FRACTURE SURGERY Right     shattered ankle    APPENDECTOMY      CARDIAC SURGERY      Open Heart Surgery    CORONARY ARTERY BYPASS GRAFT      HIP FRACTURE SURGERY Right     HYSTERECTOMY      TOTAL    KNEE SURGERY Right     meniscus tear    OOPHORECTOMY      SPINE SURGERY      L4 and L5    TIBIA FRACTURE SURGERY Right     TONSILLECTOMY             Medications and Allergies     Medications  Outpatient Medications Marked as Taking for the 1/18/24 encounter (Office Visit) with Russell Judd FNP   Medication Sig Dispense Refill    aspirin (ECOTRIN) 81 MG EC tablet Take 81 mg by mouth once daily.      coQ10, ubiquinol, 200 mg Cap Take 1 tablet by mouth once daily.      diphenhydrAMINE (SOMINEX) 25 mg tablet Take 25 mg by mouth nightly as needed for Insomnia.      docusate sodium (COLACE) 100 MG capsule Take 100 mg by mouth 2 (two) times daily.      FEROSUL 325 mg (65 mg iron) Tab tablet TAKE 1 TABLET ONE TIME DAILY 90 tablet 1    hydrOXYzine HCL (ATARAX) 25 MG tablet TAKE 1 TABLET (25 MG TOTAL) BY MOUTH EVERY EVENING. 90 tablet 1    LORazepam (ATIVAN) 0.5 MG tablet Take 1 tablet (0.5 mg total) by mouth every 8 (eight) hours as needed for Anxiety (anxiety). 21 tablet 0    tiZANidine (ZANAFLEX) 4 MG tablet Take 4 mg by mouth every 6 (six) hours as needed.      [DISCONTINUED] allopurinoL (ZYLOPRIM) 100 MG tablet TAKE 1 TABLET ONE TIME DAILY 90 tablet 0    [DISCONTINUED] amLODIPine (NORVASC) 5 MG tablet Take 1 tablet (5 mg total) by mouth daily as needed. 30 tablet 11    [DISCONTINUED] atorvastatin (LIPITOR) 80 MG tablet Take 1 tablet (80 mg total) by mouth once daily. 90 tablet 1     [DISCONTINUED] benazepriL (LOTENSIN) 40 MG tablet TAKE 1 TABLET EVERY NIGHT 90 tablet 1    [DISCONTINUED] buPROPion (WELLBUTRIN XL) 150 MG TB24 tablet TAKE 1 TABLET ONE TIME DAILY 90 tablet 1    [DISCONTINUED] carvediloL (COREG) 25 MG tablet TAKE 1 TABLET TWICE DAILY 180 tablet 1    [DISCONTINUED] levothyroxine (SYNTHROID) 50 MCG tablet TAKE 1 TABLET ONE TIME DAILY 90 tablet 0    [DISCONTINUED] nitroGLYCERIN (NITROSTAT) 0.4 MG SL tablet Place 0.4 mg under the tongue every 5 (five) minutes as needed for Chest pain.      [DISCONTINUED] potassium chloride (MICRO-K) 10 MEQ CpSR TAKE 1 CAPSULE ONE TIME DAILY 90 capsule 1    [DISCONTINUED] spironolactone (ALDACTONE) 25 MG tablet TAKE 1 TABLET ONE TIME DAILY 90 tablet 1    [DISCONTINUED] topiramate (TOPAMAX) 50 MG tablet TAKE 1 TABLET TWICE DAILY 180 tablet 1       Allergies  Review of patient's allergies indicates:   Allergen Reactions    Codeine Swelling    Codeine phosphate Other (See Comments)    Tetanus and diphtheria toxoids Itching    Tetanus immune globulin Other (See Comments)       Physical Examination     Vitals:    01/18/24 1052   BP: 138/76   Pulse: (!) 55   Resp: 16   Temp: 97.9 °F (36.6 °C)     Physical Exam  Vitals and nursing note reviewed.   Constitutional:       General: She is awake.      Appearance: Normal appearance.   HENT:      Head: Normocephalic.      Right Ear: Tympanic membrane, ear canal and external ear normal.      Left Ear: Tympanic membrane, ear canal and external ear normal.      Nose: Nose normal.      Mouth/Throat:      Lips: Pink.      Mouth: Mucous membranes are moist.      Pharynx: Oropharynx is clear. Uvula midline.   Cardiovascular:      Rate and Rhythm: Normal rate and regular rhythm.      Heart sounds: Normal heart sounds, S1 normal and S2 normal.   Pulmonary:      Effort: Pulmonary effort is normal. No respiratory distress.      Breath sounds: Normal breath sounds. No decreased breath sounds, wheezing, rhonchi or rales.    Abdominal:      General: Bowel sounds are normal.      Palpations: Abdomen is soft.      Tenderness: There is no abdominal tenderness.   Musculoskeletal:      Cervical back: Normal range of motion.   Skin:     General: Skin is warm.      Capillary Refill: Capillary refill takes less than 2 seconds.   Neurological:      Mental Status: She is alert and oriented to person, place, and time.   Psychiatric:         Thought Content: Thought content does not include homicidal or suicidal ideation. Thought content does not include homicidal or suicidal plan.               Procedures   Assessment and Plan (including Health Maintenance)   :    Plan:     Problem List Items Addressed This Visit          Psychiatric    Depression    Current Assessment & Plan     Symptoms currently controlled. Continue current medication regimen. RTC 3 months for follow up. Denies any thoughts of suicide or self harm         Relevant Medications    buPROPion (WELLBUTRIN XL) 150 MG TB24 tablet    Anxiety    Current Assessment & Plan     Symptoms currently controlled. Continue current medication regimen. RTC 3 months for follow up. Denies any thoughts of suicide or self harm            Cardiac/Vascular    Essential hypertension - Primary    Current Assessment & Plan     Monitor BP daily and keep BP daily log to bring to next visit. Exercise at least 5 times a week. Keep scheduled appts. RTC sooner if BP is staying <120/70. Dash diet.    DASH- includes foods rich in K+, calcium and Magnesium.The diet limits foods high in sodium, saturated fats and added sugars. This is a flexible balanced eating plan that helps create heart healthy eating style for life. Diet is rich in vegetable, whole grains and fruits. It includes fat free or low fat dairy products, fish, poultry, nuts. Chose foods high in K+, calcium, magnesium, fiber, protein, and low in saturated fats and sodium.          Relevant Medications    amLODIPine (NORVASC) 5 MG tablet    benazepriL  "(LOTENSIN) 40 MG tablet    carvediloL (COREG) 25 MG tablet    potassium chloride (MICRO-K) 10 MEQ CpSR    spironolactone (ALDACTONE) 25 MG tablet    Other Relevant Orders    CBC Auto Differential (Completed)    Comprehensive Metabolic Panel (Completed)    Hyperlipidemia    Current Assessment & Plan     Lipid panel obtained at today's visit. Goal LDL is less than 70. Continue current meds and low fat/low cholesterol diet with increased exercise as tolerated. Will follow up with labs. Patient to follow up in 3 months or as needed    A few changes in your diet can reduce cholesterol and improve your heart health. Saturated fats, found primarily in red meat and full-fat dairy products, raise your total cholesterol. Decreasing your consumption of saturated fats can reduce your low-density lipoprotein (LDL) cholesterol. rans fats, sometimes listed on food labels as "partially hydrogenated vegetable oil," are often used in margarines and store-bought cookies, crackers and cakes.     Trans fats raise overall cholesterol levels. Omega-3 fatty acids don't affect LDL cholesterol. But they have other heart-healthy benefits, including reducing blood pressure. Foods with omega-3 fatty acids include salmon, mackerel, herring, walnuts and flaxseeds.Soluble fiber can reduce the absorption of cholesterol into your bloodstream. Soluble fiber is found in such foods as oatmeal, kidney beans, [place] sprouts, apples and pears.  at least 30 minutes of exercise five times a week or vigorous aerobic activity for 20 minutes three times a week if tolerated.           Relevant Medications    atorvastatin (LIPITOR) 80 MG tablet    Other Relevant Orders    Lipid Panel (Completed)    CK (Completed)    Chest pain    Current Assessment & Plan     Lab work today and will call pt with results and any new orders. She denies any recent chest pain/pressure this week. Discussed if symptoms return then she needs to go to ER for evaluation.         " Relevant Medications    nitroGLYCERIN (NITROSTAT) 0.4 MG SL tablet    Other Relevant Orders    NT-Pro Natriuretic Peptide (Completed)       Endocrine    Hypothyroidism    Current Assessment & Plan     Take medication with a glass of water on an empty stomach daily, wait 30 minutes before consuming anything else. Separate from vitamins, iron  by 6 hours          Relevant Medications    levothyroxine (SYNTHROID) 50 MCG tablet    Other Relevant Orders    TSH (Completed)    Encounter for screening for diabetes mellitus    Current Assessment & Plan     Lab work today and will call with results         Relevant Orders    Hemoglobin A1C (Completed)       Palliative Care    Other long term (current) drug therapy    Relevant Orders    POCT Urine Drug Screen Presump (Completed)       Other    Abnormal finding of blood chemistry, unspecified    Relevant Orders    Hemoglobin A1C (Completed)       Health Maintenance Topics with due status: Not Due       Topic Last Completion Date    DEXA Scan 07/15/2022    Colorectal Cancer Screening 03/24/2023    Aspirin/Antiplatelet Therapy 01/18/2024    Hemoglobin A1c (Diabetic Prevention Screening) 01/18/2024    Lipid Panel 01/18/2024       Future Appointments   Date Time Provider Department Center   2/15/2024  1:15 PM Kimberly Rome MD OB [facility]   2/28/2024  1:00 PM [facility] MAMMO1 OB [facility] [place]   3/19/2024  9:00 AM Pari Kirk ACNP [facility] CARD [facility]   7/24/2024  8:00 AM AWV NURSE, [facility]        Health Maintenance Due   Topic Date Due    Hepatitis C Screening  Never done    HIV Screening  Never done    RSV Vaccine (Age 60+ and Pregnant patients) (1 - 1-dose 60+ series) Never done    Shingles Vaccine (2 of 2) 10/07/2022    COVID-19 Vaccine (5 - 2023-24 season) 09/01/2023    Mammogram  02/22/2024        Follow up in about 3 months (around 4/18/2024).     Signature:  AHSA AGUILAR  [address] [place]  [address]    Date of encounter: 1/18/24     no

## 2024-01-18 NOTE — PROGRESS NOTES
01/18/24 1117   Depression Patient Health Questionnaire (PHQ-2)   Over the last two weeks how often have you been bothered by little interest or pleasure in doing things 0   Over the last two weeks how often have you been bothered by feeling down, depressed or hopeless 0   PHQ-2 Total Score 0   Depression Patient Health Questionnaire (PHQ-9)   Over the last two weeks how often have you been bothered by trouble falling or staying asleep, or sleeping too much 3   Over the last two weeks how often have you been bothered by feeling tired or having little energy 0   Over the last two weeks how often have you been bothered by a poor appetite or overeating 0   Over the last two weeks how often have you been bothered by feeling bad about yourself - or that you are a failure or have let yourself or your family down 0   Over the last two weeks how often have you been bothered by trouble concentrating on things, such as reading the newspaper or watching television 0   Over the last two weeks how often have you been bothered by moving or speaking so slowly that other people could have noticed. Or the opposite - being so fidgety or restless that you have been moving around a lot more than usual. 0   Over the last two weeks how often have you been bothered by thoughts that you would be better off dead, or of hurting yourself 0   If you checked off any problems, how difficult have these problems made it for you to do your work, take care of things at home or get along with other people? Not difficult at all   PHQ-9 Score 3   PHQ-9 Interpretation Minimal or None

## 2024-01-19 ENCOUNTER — PATIENT MESSAGE (OUTPATIENT)
Dept: CARDIOLOGY | Facility: CLINIC | Age: 64
End: 2024-01-19
Payer: MEDICARE

## 2024-01-22 ENCOUNTER — PATIENT MESSAGE (OUTPATIENT)
Dept: CARDIOLOGY | Facility: CLINIC | Age: 64
End: 2024-01-22
Payer: MEDICARE

## 2024-02-02 PROBLEM — R79.9 ABNORMAL FINDING OF BLOOD CHEMISTRY, UNSPECIFIED: Status: ACTIVE | Noted: 2024-02-02

## 2024-02-02 PROBLEM — Z13.1 ENCOUNTER FOR SCREENING FOR DIABETES MELLITUS: Status: ACTIVE | Noted: 2024-02-02

## 2024-02-02 PROBLEM — R07.9 CHEST PAIN: Status: ACTIVE | Noted: 2024-02-02

## 2024-02-02 PROBLEM — F41.9 ANXIETY: Status: ACTIVE | Noted: 2024-02-02

## 2024-02-02 NOTE — ASSESSMENT & PLAN NOTE
Monitor BP daily and keep BP daily log to bring to next visit. Exercise at least 5 times a week. Keep scheduled appts. RTC sooner if BP is staying <120/70. Dash diet.    DASH- includes foods rich in K+, calcium and Magnesium.The diet limits foods high in sodium, saturated fats and added sugars. This is a flexible balanced eating plan that helps create heart healthy eating style for life. Diet is rich in vegetable, whole grains and fruits. It includes fat free or low fat dairy products, fish, poultry, nuts. Chose foods high in K+, calcium, magnesium, fiber, protein, and low in saturated fats and sodium.

## 2024-02-02 NOTE — ASSESSMENT & PLAN NOTE
Lab work today and will call pt with results and any new orders. She denies any recent chest pain/pressure this week. Discussed if symptoms return then she needs to go to ER for evaluation.

## 2024-02-02 NOTE — ASSESSMENT & PLAN NOTE
Take medication with a glass of water on an empty stomach daily, wait 30 minutes before consuming anything else. Separate from vitamins, iron  by 6 hours

## 2024-02-02 NOTE — ASSESSMENT & PLAN NOTE
"Lipid panel obtained at today's visit. Goal LDL is less than 70. Continue current meds and low fat/low cholesterol diet with increased exercise as tolerated. Will follow up with labs. Patient to follow up in 3 months or as needed    A few changes in your diet can reduce cholesterol and improve your heart health. Saturated fats, found primarily in red meat and full-fat dairy products, raise your total cholesterol. Decreasing your consumption of saturated fats can reduce your low-density lipoprotein (LDL) cholesterol. rans fats, sometimes listed on food labels as "partially hydrogenated vegetable oil," are often used in margarines and store-bought cookies, crackers and cakes.     Trans fats raise overall cholesterol levels. Omega-3 fatty acids don't affect LDL cholesterol. But they have other heart-healthy benefits, including reducing blood pressure. Foods with omega-3 fatty acids include salmon, mackerel, herring, walnuts and flaxseeds.Soluble fiber can reduce the absorption of cholesterol into your bloodstream. Soluble fiber is found in such foods as oatmeal, kidney beans, [place] sprouts, apples and pears.  at least 30 minutes of exercise five times a week or vigorous aerobic activity for 20 minutes three times a week if tolerated.    "

## 2024-02-02 NOTE — ASSESSMENT & PLAN NOTE
Symptoms currently controlled. Continue current medication regimen. RTC 3 months for follow up. Denies any thoughts of suicide or self harm

## 2024-02-28 ENCOUNTER — HOSPITAL ENCOUNTER (OUTPATIENT)
Dept: RADIOLOGY | Facility: HOSPITAL | Age: 64
Discharge: HOME | End: 2024-02-28
Attending: NURSE PRACTITIONER
Payer: MEDICARE

## 2024-02-28 VITALS — BODY MASS INDEX: 29.02 KG/M2 | WEIGHT: 170 LBS | HEIGHT: 64 IN

## 2024-02-28 DIAGNOSIS — Z12.31 OTHER SCREENING MAMMOGRAM: ICD-10-CM

## 2024-02-28 PROCEDURE — 77067 SCR MAMMO BI INCL CAD: CPT | Mod: TC

## 2024-03-19 ENCOUNTER — OFFICE VISIT (OUTPATIENT)
Dept: CARDIOLOGY | Facility: CLINIC | Age: 64
End: 2024-03-19
Payer: MEDICARE

## 2024-03-19 VITALS
WEIGHT: 173.63 LBS | DIASTOLIC BLOOD PRESSURE: 62 MMHG | HEART RATE: 63 BPM | HEIGHT: 64 IN | BODY MASS INDEX: 29.64 KG/M2 | OXYGEN SATURATION: 96 % | SYSTOLIC BLOOD PRESSURE: 110 MMHG

## 2024-03-19 DIAGNOSIS — I25.10 CORONARY ARTERY DISEASE, UNSPECIFIED VESSEL OR LESION TYPE, UNSPECIFIED WHETHER ANGINA PRESENT, UNSPECIFIED WHETHER NATIVE OR TRANSPLANTED HEART: Primary | ICD-10-CM

## 2024-03-19 DIAGNOSIS — R07.9 CHEST PAIN, UNSPECIFIED TYPE: ICD-10-CM

## 2024-03-19 DIAGNOSIS — I25.10 CORONARY ARTERY DISEASE INVOLVING NATIVE CORONARY ARTERY OF NATIVE HEART WITHOUT ANGINA PECTORIS: ICD-10-CM

## 2024-03-19 DIAGNOSIS — E78.5 HYPERLIPIDEMIA, UNSPECIFIED HYPERLIPIDEMIA TYPE: ICD-10-CM

## 2024-03-19 PROCEDURE — 99215 OFFICE O/P EST HI 40 MIN: CPT | Mod: PBBFAC | Performed by: NURSE PRACTITIONER

## 2024-03-19 PROCEDURE — 3074F SYST BP LT 130 MM HG: CPT | Mod: CPTII,,, | Performed by: NURSE PRACTITIONER

## 2024-03-19 PROCEDURE — 93005 ELECTROCARDIOGRAM TRACING: CPT | Mod: PBBFAC | Performed by: INTERNAL MEDICINE

## 2024-03-19 PROCEDURE — 93010 ELECTROCARDIOGRAM REPORT: CPT | Mod: S$PBB,,, | Performed by: INTERNAL MEDICINE

## 2024-03-19 PROCEDURE — 1159F MED LIST DOCD IN RCRD: CPT | Mod: CPTII,,, | Performed by: NURSE PRACTITIONER

## 2024-03-19 PROCEDURE — 3044F HG A1C LEVEL LT 7.0%: CPT | Mod: CPTII,,, | Performed by: NURSE PRACTITIONER

## 2024-03-19 PROCEDURE — 99214 OFFICE O/P EST MOD 30 MIN: CPT | Mod: S$PBB,,, | Performed by: NURSE PRACTITIONER

## 2024-03-19 PROCEDURE — 1160F RVW MEDS BY RX/DR IN RCRD: CPT | Mod: CPTII,,, | Performed by: NURSE PRACTITIONER

## 2024-03-19 PROCEDURE — 3008F BODY MASS INDEX DOCD: CPT | Mod: CPTII,,, | Performed by: NURSE PRACTITIONER

## 2024-03-19 PROCEDURE — 4010F ACE/ARB THERAPY RXD/TAKEN: CPT | Mod: CPTII,,, | Performed by: NURSE PRACTITIONER

## 2024-03-19 PROCEDURE — 3078F DIAST BP <80 MM HG: CPT | Mod: CPTII,,, | Performed by: NURSE PRACTITIONER

## 2024-03-19 NOTE — PROGRESS NOTES
PCP: Kody Fonseca DO    Referring Provider:     Subjective:   Becki Mejia is a 63 y.o. female with hx of CAD s/p CABG 2017 for LM disease, HTN and HLD,  who presents for routine follow up. She states that she has done well. She has chronic, stable CARNES. She reports an episode in 2024 for which she was evaluated by her PCP. She has had none since single episode in 2024 and no sublingual ntg use    2023 presents for routine follow up. She states that she is doing well. She does have chronic, stable CARNES. Her only other issues is a cramp like pain to her BLE (calves) with ambulation of <1/4 mild c/w claudication. She is concerned that this is r/t her statin therapy. She has been on this dose for years.     3/13/2023 presents for routine follow up. She does not exercise but remains physically active and asymptomtic.         Fhx:  Family History   Problem Relation Age of Onset    Diabetes Mother     Hypertension Mother     Uterine cancer Mother     Parkinsonism Father     Diabetes Father     Hypertension Father     Colon cancer Sister     Hypoglycemic Sister     Liver cancer Sister     Diabetes Sister     No Known Problems Daughter     Diabetes Paternal Grandmother     No Known Problems Brother     Hepatitis Son         C    Post-traumatic stress disorder Son     Hypertension Son     Breast cancer Paternal Great-Grandmother      Shx:   Social History     Socioeconomic History    Marital status:      Spouse name: Harper    Number of children: 3   Occupational History     Comment: DISABILITY   Tobacco Use    Smoking status: Former     Current packs/day: 0.00     Average packs/day: 1 pack/day for 19.0 years (19.0 ttl pk-yrs)     Types: Cigarettes     Start date:      Quit date:      Years since quittin.2     Passive exposure: Past    Smokeless tobacco: Never    Tobacco comments:     smoked off and on   Substance and Sexual Activity    Alcohol use: Never    Drug use: Never    Sexual  activity: Yes     Partners: Male   Social History Narrative    Lives in home with   and 2 grandchildren       EKG 3/19/2024 RSR with HR 64 bpm; normal EKG; when compared with EKG 9/19/2023 no significant change was found.  9/19/2023 RSR with HR 65 bpm; normal EKG  3/13/2023 RSR with HR 67 bpm; minimal voltage criteria for LVH  8/9/2022 RSR with HR 65 bpm; minim al voltage criteria for LVH    ECHO 11/4/2019  Mild concentric Lvh with normal VL size and systolic funciton, EF 45%  Trace MR with normal LA Size  Aortic valve sclerosis without stenosis. Normal aortic root size  Mild TR with o/w normal right heart chambers and valves. RVSP 265 mmHG + RAP  Since the study of 9/27/2019 there is no significant change.    [facility] 10/29/2019  Severe single vessel CAD involving the prox LCX  Patent svg to the OM  Atrophic LIMA to the diagonal branch of the LAD  Normal LV size and systolic function, EF 55%  No significant MR  LVDD., LVEDP 18 mmHg        Lab Results   Component Value Date     01/18/2024    K 4.5 01/18/2024     01/18/2024    CO2 26 01/18/2024    BUN 19 (H) 01/18/2024    CREATININE 0.95 01/18/2024    CALCIUM 8.5 01/18/2024    ANIONGAP 11 01/18/2024    EGFRNONAA 64 04/18/2022       Lab Results   Component Value Date    CHOL 160 01/18/2024    CHOL 144 07/17/2023    CHOL 153 01/17/2023     Lab Results   Component Value Date    HDL 62 (H) 01/18/2024    HDL 59 07/17/2023    HDL 56 01/17/2023     Lab Results   Component Value Date    LDLCALC 86 01/18/2024    LDLCALC 73 07/17/2023    LDLCALC 80 01/17/2023     Lab Results   Component Value Date    TRIG 61 01/18/2024    TRIG 59 07/17/2023    TRIG 87 01/17/2023     Lab Results   Component Value Date    CHOLHDL 2.6 01/18/2024    CHOLHDL 2.4 07/17/2023    CHOLHDL 2.7 01/17/2023       Lab Results   Component Value Date    WBC 4.60 01/18/2024    HGB 11.5 (L) 01/18/2024    HCT 35.3 (L) 01/18/2024    MCV 92.7 01/18/2024     (L) 01/18/2024           Current  Outpatient Medications:     allopurinoL (ZYLOPRIM) 100 MG tablet, Take 1 tablet (100 mg total) by mouth once daily., Disp: 90 tablet, Rfl: 1    amLODIPine (NORVASC) 5 MG tablet, Take 1 tablet (5 mg total) by mouth daily as needed (If blood pressure is over 120/80)., Disp: 30 tablet, Rfl: 2    aspirin (ECOTRIN) 81 MG EC tablet, Take 81 mg by mouth once daily., Disp: , Rfl:     atorvastatin (LIPITOR) 80 MG tablet, Take 1 tablet (80 mg total) by mouth once daily., Disp: 90 tablet, Rfl: 1    benazepriL (LOTENSIN) 40 MG tablet, Take 1 tablet (40 mg total) by mouth every evening., Disp: 90 tablet, Rfl: 1    buPROPion (WELLBUTRIN XL) 150 MG TB24 tablet, Take 1 tablet (150 mg total) by mouth once daily., Disp: 90 tablet, Rfl: 1    carvediloL (COREG) 25 MG tablet, Take 1 tablet (25 mg total) by mouth 2 (two) times daily., Disp: 180 tablet, Rfl: 1    diphenhydrAMINE (SOMINEX) 25 mg tablet, Take 25 mg by mouth nightly as needed for Insomnia., Disp: , Rfl:     FEROSUL 325 mg (65 mg iron) Tab tablet, TAKE 1 TABLET ONE TIME DAILY, Disp: 90 tablet, Rfl: 1    levothyroxine (SYNTHROID) 50 MCG tablet, Take 1 tablet (50 mcg total) by mouth before breakfast., Disp: 90 tablet, Rfl: 1    nitroGLYCERIN (NITROSTAT) 0.4 MG SL tablet, Place 1 tablet (0.4 mg total) under the tongue every 5 (five) minutes as needed for Chest pain., Disp: 30 tablet, Rfl: 0    potassium chloride (MICRO-K) 10 MEQ CpSR, Take 1 capsule (10 mEq total) by mouth once daily., Disp: 90 capsule, Rfl: 1    spironolactone (ALDACTONE) 25 MG tablet, Take 1 tablet (25 mg total) by mouth once daily., Disp: 90 tablet, Rfl: 1    topiramate (TOPAMAX) 50 MG tablet, Take 1 tablet (50 mg total) by mouth 2 (two) times daily., Disp: 180 tablet, Rfl: 1    coQ10, ubiquinol, 200 mg Cap, Take 1 tablet by mouth once daily., Disp: , Rfl:     docusate sodium (COLACE) 100 MG capsule, Take 100 mg by mouth 2 (two) times daily., Disp: , Rfl:     hydrOXYzine HCL (ATARAX) 25 MG tablet, TAKE 1  "TABLET (25 MG TOTAL) BY MOUTH EVERY EVENING. (Patient not taking: Reported on 3/19/2024), Disp: 90 tablet, Rfl: 1    LORazepam (ATIVAN) 0.5 MG tablet, Take 1 tablet (0.5 mg total) by mouth every 8 (eight) hours as needed for Anxiety (anxiety)., Disp: 21 tablet, Rfl: 0    tiZANidine (ZANAFLEX) 4 MG tablet, Take 4 mg by mouth every 6 (six) hours as needed., Disp: , Rfl:   Meds reviewed and reconciled.      Review of Systems   Respiratory:  Negative for shortness of breath.    Cardiovascular:  Negative for chest pain, palpitations, orthopnea, claudication, leg swelling and PND.   Neurological:  Negative for dizziness, loss of consciousness and weakness.           Objective:   /62 (BP Location: Left arm, Patient Position: Sitting)   Pulse 63   Ht 5' 4" (1.626 m)   Wt 78.7 kg (173 lb 9.6 oz)   LMP  (LMP Unknown)   SpO2 96%   BMI 29.80 kg/m²     Physical Exam  Vitals and nursing note reviewed.   Constitutional:       Appearance: Normal appearance. She is normal weight.   HENT:      Head: Normocephalic and atraumatic.   Neck:      Vascular: No carotid bruit.   Cardiovascular:      Rate and Rhythm: Normal rate and regular rhythm.      Pulses: Normal pulses.      Heart sounds: Normal heart sounds.   Pulmonary:      Effort: Pulmonary effort is normal.      Breath sounds: Normal breath sounds.   Abdominal:      Palpations: Abdomen is soft.   Musculoskeletal:      Cervical back: Neck supple.      Right lower leg: No edema.      Left lower leg: No edema.   Skin:     General: Skin is warm and dry.      Capillary Refill: Capillary refill takes less than 2 seconds.   Neurological:      General: No focal deficit present.      Mental Status: She is alert.           Assessment:     1. Coronary artery disease, unspecified vessel or lesion type, unspecified whether angina present, unspecified whether native or transplanted heart  EKG 12-lead    EKG 12-lead      2. Hyperlipidemia, unspecified hyperlipidemia type        3. " Coronary artery disease involving native coronary artery of native heart without angina pectoris        4. Chest pain, unspecified type              Plan:   Hyperlipidemia  Lab Results   Component Value Date    CHOL 160 01/18/2024    CHOL 144 07/17/2023    CHOL 153 01/17/2023     Lab Results   Component Value Date    HDL 62 (H) 01/18/2024    HDL 59 07/17/2023    HDL 56 01/17/2023     Lab Results   Component Value Date    LDLCALC 86 01/18/2024    LDLCALC 73 07/17/2023    LDLCALC 80 01/17/2023     Lab Results   Component Value Date    TRIG 61 01/18/2024    TRIG 59 07/17/2023    TRIG 87 01/17/2023       Lab Results   Component Value Date    CHOLHDL 2.6 01/18/2024    CHOLHDL 2.4 07/17/2023    CHOLHDL 2.7 01/17/2023     Lipitor 80 mg po daily  Lipids followed by PCP    Coronary artery disease involving native coronary artery of native heart  S/p CABG 8/31/2017 for LM disease    Chest pain  None since single episode in 1/2024  No sublingual ntg use    RTC: 6 months

## 2024-03-19 NOTE — ASSESSMENT & PLAN NOTE
Lab Results   Component Value Date    CHOL 160 01/18/2024    CHOL 144 07/17/2023    CHOL 153 01/17/2023     Lab Results   Component Value Date    HDL 62 (H) 01/18/2024    HDL 59 07/17/2023    HDL 56 01/17/2023     Lab Results   Component Value Date    LDLCALC 86 01/18/2024    LDLCALC 73 07/17/2023    LDLCALC 80 01/17/2023     Lab Results   Component Value Date    TRIG 61 01/18/2024    TRIG 59 07/17/2023    TRIG 87 01/17/2023       Lab Results   Component Value Date    CHOLHDL 2.6 01/18/2024    CHOLHDL 2.4 07/17/2023    CHOLHDL 2.7 01/17/2023     Lipitor 80 mg po daily  Lipids followed by PCP

## 2024-03-20 LAB
OHS QRS DURATION: 90 MS
OHS QTC CALCULATION: 414 MS

## 2024-04-04 DIAGNOSIS — I10 ESSENTIAL HYPERTENSION: Primary | ICD-10-CM

## 2024-04-04 RX ORDER — AMLODIPINE BESYLATE 5 MG/1
5 TABLET ORAL DAILY PRN
Qty: 90 TABLET | Refills: 0 | Status: SHIPPED | OUTPATIENT
Start: 2024-04-04 | End: 2025-04-04

## 2024-04-29 ENCOUNTER — HOSPITAL ENCOUNTER (OUTPATIENT)
Dept: RADIOLOGY | Facility: HOSPITAL | Age: 64
Discharge: HOME | End: 2024-04-29
Payer: MEDICARE

## 2024-04-29 ENCOUNTER — OFFICE VISIT (OUTPATIENT)
Dept: FAMILY MEDICINE | Facility: CLINIC | Age: 64
End: 2024-04-29
Payer: MEDICARE

## 2024-04-29 VITALS
WEIGHT: 175.38 LBS | BODY MASS INDEX: 29.94 KG/M2 | SYSTOLIC BLOOD PRESSURE: 138 MMHG | TEMPERATURE: 98 F | HEART RATE: 58 BPM | OXYGEN SATURATION: 98 % | DIASTOLIC BLOOD PRESSURE: 74 MMHG | RESPIRATION RATE: 18 BRPM | HEIGHT: 64 IN

## 2024-04-29 DIAGNOSIS — M79.662 PAIN AND SWELLING OF LEFT LOWER LEG: ICD-10-CM

## 2024-04-29 DIAGNOSIS — M79.662 PAIN AND SWELLING OF LEFT LOWER LEG: Primary | ICD-10-CM

## 2024-04-29 DIAGNOSIS — M79.89 PAIN AND SWELLING OF LEFT LOWER LEG: ICD-10-CM

## 2024-04-29 DIAGNOSIS — M79.89 PAIN AND SWELLING OF LEFT LOWER LEG: Primary | ICD-10-CM

## 2024-04-29 PROCEDURE — 1159F MED LIST DOCD IN RCRD: CPT | Mod: ,,,

## 2024-04-29 PROCEDURE — 3075F SYST BP GE 130 - 139MM HG: CPT | Mod: ,,,

## 2024-04-29 PROCEDURE — 4010F ACE/ARB THERAPY RXD/TAKEN: CPT | Mod: ,,,

## 2024-04-29 PROCEDURE — 3044F HG A1C LEVEL LT 7.0%: CPT | Mod: ,,,

## 2024-04-29 PROCEDURE — 3078F DIAST BP <80 MM HG: CPT | Mod: ,,,

## 2024-04-29 PROCEDURE — 93971 EXTREMITY STUDY: CPT | Mod: TC,LT

## 2024-04-29 PROCEDURE — 99213 OFFICE O/P EST LOW 20 MIN: CPT | Mod: 25,,,

## 2024-04-29 PROCEDURE — 3008F BODY MASS INDEX DOCD: CPT | Mod: ,,,

## 2024-04-29 PROCEDURE — 1160F RVW MEDS BY RX/DR IN RCRD: CPT | Mod: ,,,

## 2024-04-29 PROCEDURE — 96372 THER/PROPH/DIAG INJ SC/IM: CPT | Mod: ,,,

## 2024-04-29 RX ORDER — METHYLPREDNISOLONE 4 MG/1
TABLET ORAL
Qty: 21 EACH | Refills: 0 | Status: SHIPPED | OUTPATIENT
Start: 2024-04-29 | End: 2024-05-20

## 2024-04-29 RX ORDER — DEXAMETHASONE SODIUM PHOSPHATE 4 MG/ML
4 INJECTION, SOLUTION INTRA-ARTICULAR; INTRALESIONAL; INTRAMUSCULAR; INTRAVENOUS; SOFT TISSUE
Status: COMPLETED | OUTPATIENT
Start: 2024-04-29 | End: 2024-04-29

## 2024-04-29 RX ORDER — KETOROLAC TROMETHAMINE 10 MG/1
10 TABLET, FILM COATED ORAL EVERY 6 HOURS
Qty: 20 TABLET | Refills: 0 | Status: SHIPPED | OUTPATIENT
Start: 2024-04-29 | End: 2024-05-04

## 2024-04-29 RX ORDER — KETOROLAC TROMETHAMINE 30 MG/ML
60 INJECTION, SOLUTION INTRAMUSCULAR; INTRAVENOUS
Status: COMPLETED | OUTPATIENT
Start: 2024-04-29 | End: 2024-04-29

## 2024-04-29 RX ADMIN — DEXAMETHASONE SODIUM PHOSPHATE 4 MG: 4 INJECTION, SOLUTION INTRA-ARTICULAR; INTRALESIONAL; INTRAMUSCULAR; INTRAVENOUS; SOFT TISSUE at 09:04

## 2024-04-29 RX ADMIN — KETOROLAC TROMETHAMINE 60 MG: 30 INJECTION, SOLUTION INTRAMUSCULAR; INTRAVENOUS at 09:04

## 2024-04-29 NOTE — ASSESSMENT & PLAN NOTE
US doppler scheduled today. Will call pt with results and any new orders. Toradol IM and Decadron IM today. Toradol and Medrol dose pack RX. Medication instructions and education given with understanding voiced. Compression stockings and elevated legs. RTC with worsening, new or persistent symptoms for further evaluation.

## 2024-04-29 NOTE — PROGRESS NOTES
EDGAR DIALLO, ASHA     [address]  [place]  [address]      PATIENT NAME: Becki Mejia  : 1960  DATE: 24  MRN: [id number]        Reason for Visit / Chief Complaint: Leg Pain (Patient is a 63 year old female presenting with pain behind the left knee for the past 2 weeks that has gotten progressively worse.  The pain starts behind the left knee and travels down the left leg and around to the front of the knee.  Also reports pain to the left heel. ), Eye Problem (Reports that when she woke up this morning her eyes were itchy.  She thinks it may be her allergies.), and Health Maintenance (Hepatitis C Screening Never done---declined/HIV Screening Never done---declined/RSV Vaccine (Age 60+ and Pregnant patients)(1 - 1-dose 60+ series) Never done---declined/Shingles Vaccine(2 of 2) due on 10/07/2022---declined/COVID-19 Vaccine(2023- season) due on 2023---declined/DEXA Scan due on 07/15/2024---not due yet)         History of Present Illness / Problem Focused Workflow     62 y/o female presents to clinic with complaints of left knee and leg pain that started about 2 weeks ago. She denies any known injury of left leg. States pain starts behind left knee and travels down the left leg and around the front of the left knee. Rates pain 8/10 today. States has taken Diclofenac with not much relief of pain.      Review of Systems     @Review of Systems   Constitutional:  Negative for chills, fatigue and fever.   HENT:  Negative for nasal congestion, ear discharge, ear pain, rhinorrhea, sinus pressure/congestion and sore throat.    Respiratory:  Negative for cough, chest tightness, shortness of breath, wheezing and stridor.    Cardiovascular:  Negative for palpitations and claudication.   Gastrointestinal:  Negative for abdominal pain, constipation, diarrhea, nausea, vomiting and reflux.   Genitourinary:  Negative for dysuria, flank pain, frequency, hematuria and  urgency.   Musculoskeletal:  Positive for joint swelling (left knee pain). Negative for myalgias.   Neurological:  Negative for dizziness, weakness, light-headedness and headaches.   Psychiatric/Behavioral:  Negative for suicidal ideas.        Medical / Social / Family History     Past Medical History:   Diagnosis Date    Coronary artery disease     Depression     Diverticulosis     Fibromyalgia     Fibromyalgia     Heart disease     Hyperlipidemia     Hypertension     Hypothyroidism     Mass of adrenal gland     Osteopenia     Ventral hernia        Past Surgical History:   Procedure Laterality Date    ADENOIDECTOMY      ANKLE FRACTURE SURGERY Right     shattered ankle    APPENDECTOMY      CARDIAC SURGERY      Open Heart Surgery    CORONARY ARTERY BYPASS GRAFT      HIP FRACTURE SURGERY Right     HYSTERECTOMY      TOTAL    KNEE SURGERY Right     meniscus tear    OOPHORECTOMY      SPINE SURGERY      L4 and L5    TIBIA FRACTURE SURGERY Right     TONSILLECTOMY             Medications and Allergies     Medications  Current Outpatient Medications   Medication Sig Dispense Refill    allopurinoL (ZYLOPRIM) 100 MG tablet Take 1 tablet (100 mg total) by mouth once daily. 90 tablet 1    amLODIPine (NORVASC) 5 MG tablet Take 1 tablet (5 mg total) by mouth daily as needed (If blood pressure is over 120/80). 90 tablet 0    aspirin (ECOTRIN) 81 MG EC tablet Take 81 mg by mouth once daily.      atorvastatin (LIPITOR) 80 MG tablet Take 1 tablet (80 mg total) by mouth once daily. 90 tablet 1    benazepriL (LOTENSIN) 40 MG tablet Take 1 tablet (40 mg total) by mouth every evening. 90 tablet 1    buPROPion (WELLBUTRIN XL) 150 MG TB24 tablet Take 1 tablet (150 mg total) by mouth once daily. 90 tablet 1    carvediloL (COREG) 25 MG tablet Take 1 tablet (25 mg total) by mouth 2 (two) times daily. 180 tablet 1    diphenhydrAMINE (SOMINEX) 25 mg tablet Take 25 mg by mouth nightly as needed for Insomnia.      docusate sodium (COLACE) 100 MG  capsule Take 100 mg by mouth 2 (two) times daily.      FEROSUL 325 mg (65 mg iron) Tab tablet TAKE 1 TABLET ONE TIME DAILY 90 tablet 1    hydrOXYzine HCL (ATARAX) 25 MG tablet TAKE 1 TABLET (25 MG TOTAL) BY MOUTH EVERY EVENING. 90 tablet 1    levothyroxine (SYNTHROID) 50 MCG tablet Take 1 tablet (50 mcg total) by mouth before breakfast. 90 tablet 1    nitroGLYCERIN (NITROSTAT) 0.4 MG SL tablet Place 1 tablet (0.4 mg total) under the tongue every 5 (five) minutes as needed for Chest pain. 30 tablet 0    potassium chloride (MICRO-K) 10 MEQ CpSR Take 1 capsule (10 mEq total) by mouth once daily. 90 capsule 1    spironolactone (ALDACTONE) 25 MG tablet Take 1 tablet (25 mg total) by mouth once daily. 90 tablet 1    tiZANidine (ZANAFLEX) 4 MG tablet Take 4 mg by mouth every 6 (six) hours as needed.      topiramate (TOPAMAX) 50 MG tablet Take 1 tablet (50 mg total) by mouth 2 (two) times daily. 180 tablet 1    coQ10, ubiquinol, 200 mg Cap Take 1 tablet by mouth once daily. (Patient not taking: Reported on 4/29/2024)      ketorolac (TORADOL) 10 mg tablet Take 1 tablet (10 mg total) by mouth every 6 (six) hours. for 5 days 20 tablet 0    LORazepam (ATIVAN) 0.5 MG tablet Take 1 tablet (0.5 mg total) by mouth every 8 (eight) hours as needed for Anxiety (anxiety). 21 tablet 0    methylPREDNISolone (MEDROL DOSEPACK) 4 mg tablet use as directed 21 each 0     No current facility-administered medications for this visit.       Allergies  Review of patient's allergies indicates:   Allergen Reactions    Codeine Swelling    Codeine phosphate Other (See Comments)    Tetanus and diphtheria toxoids Itching    Tetanus immune globulin Other (See Comments)       Physical Examination     Vitals:    04/29/24 0842   BP: 138/74   Pulse: (!) 58   Resp: 18   Temp: 97.7 °F (36.5 °C)     Physical Exam  Vitals and nursing note reviewed.   Constitutional:       General: She is awake.      Appearance: Normal appearance.   HENT:      Head:  Normocephalic.      Right Ear: Tympanic membrane, ear canal and external ear normal.      Left Ear: Tympanic membrane, ear canal and external ear normal.      Nose: Nose normal.      Mouth/Throat:      Lips: Pink.      Mouth: Mucous membranes are moist.      Pharynx: Oropharynx is clear. Uvula midline.   Cardiovascular:      Rate and Rhythm: Normal rate and regular rhythm.      Heart sounds: Normal heart sounds, S1 normal and S2 normal.   Pulmonary:      Effort: Pulmonary effort is normal. No respiratory distress.      Breath sounds: Normal breath sounds. No decreased breath sounds, wheezing, rhonchi or rales.   Abdominal:      General: Bowel sounds are normal.      Palpations: Abdomen is soft.      Tenderness: There is no abdominal tenderness.   Musculoskeletal:      Cervical back: Normal range of motion.      Left lower leg: Tenderness present. 1+ Edema present.      Comments: Strength 5/5 in bilateral lower extremities with full ROM   Skin:     General: Skin is warm.      Capillary Refill: Capillary refill takes less than 2 seconds.   Neurological:      Mental Status: She is alert and oriented to person, place, and time.   Psychiatric:         Thought Content: Thought content does not include homicidal or suicidal ideation. Thought content does not include homicidal or suicidal plan.               Procedures   Assessment and Plan (including Health Maintenance)   :    Plan:     Problem List Items Addressed This Visit          Orthopedic    Pain and swelling of left lower leg - Primary    Current Assessment & Plan     US doppler scheduled today. Will call pt with results and any new orders. Toradol IM and Decadron IM today. Toradol and Medrol dose pack RX. Medication instructions and education given with understanding voiced. Compression stockings and elevated legs. RTC with worsening, new or persistent symptoms for further evaluation.          Relevant Medications    ketorolac injection 60 mg (Completed)     dexAMETHasone injection 4 mg (Completed)    ketorolac (TORADOL) 10 mg tablet    methylPREDNISolone (MEDROL DOSEPACK) 4 mg tablet    Other Relevant Orders    US Lower Extremity Veins Left       Health Maintenance Topics with due status: Not Due       Topic Last Completion Date    Colorectal Cancer Screening 03/24/2023    Hemoglobin A1c (Diabetic Prevention Screening) 01/18/2024    Lipid Panel 01/18/2024    Mammogram 02/28/2024    Aspirin/Antiplatelet Therapy 03/19/2024       Future Appointments   Date Time Provider Department Center   4/29/2024 12:00 PM [facility] US2 [facility] USND [place]   7/24/2024  8:00 AM [facility] [place]   9/19/2024  9:00 AM Pari Kirk ACNP [facility] CARD [facility]   3/5/2025  1:00 PM [facility] MAMMO1 [facility] [place]        Health Maintenance Due   Topic Date Due    Hepatitis C Screening  Never done    HIV Screening  Never done    RSV Vaccine (Age 60+ and Pregnant patients) (1 - 1-dose 60+ series) Never done    Shingles Vaccine (2 of 2) 10/07/2022    COVID-19 Vaccine (5 - 2023-24 season) 09/01/2023    DEXA Scan  07/15/2024        Follow up if symptoms worsen or fail to improve.     Signature:  ASHA AGUILAR  [facility]  [address] [address] [place]  [address]    Date of encounter: 4/29/24

## 2024-04-30 ENCOUNTER — TELEPHONE (OUTPATIENT)
Dept: FAMILY MEDICINE | Facility: CLINIC | Age: 64
End: 2024-04-30
Payer: MEDICARE

## 2024-04-30 NOTE — TELEPHONE ENCOUNTER
----- Message from ASHA Monroe sent at 4/29/2024  3:27 PM CDT -----  Doppler left lower leg is negative. RTC with worsening, new or persistent symptoms for further evaluation

## 2024-04-30 NOTE — TELEPHONE ENCOUNTER
Left voice mail for patient to call clinic. Wanted to check on patient regarding earlier conversation. How are her blood pressure readings ? Has she taken the toradol or medrol dose jazimne today? Any dizziness/light headedness? Does she need us to change her medicine to help with relief of pain and inflammation.

## 2024-04-30 NOTE — TELEPHONE ENCOUNTER
Patient notified of ultrasound results. She reported her blood pressure was elevated yesterday evening and had concerns it may have been toradol causing raised blood pressure. Patient had injection of toradol and decadron in clinic yesterday. Then she took a toradol tablet last night. She stated she had some dizziness and checked her blood pressure. Her b/p reading was 180's/80's. Patient then took two Norvasc 5 mg tablets. Later she rechecked her blood pressure and her reading was 140's/80's. She slept all night and she checked her blood pressure this morning. She reports blood pressure was 120's/70's. She took her regular medication this morning,but did not take any toradol or the medrol dose jazmine. Explained to patient is was most likely the steroid injection she received that caused the elevation in her blood pressure. Patient is at a doctor's appointment with her  this morning. Instructed patient to only take toradol when she needed pain relief and to rest today as much as possible( she has been having to take care of her  who has some health issues at this time). Hold medrol dose jazmine. She is to let our office know if she has anymore issues with her blood pressure being elevated.

## 2024-04-30 NOTE — TELEPHONE ENCOUNTER
"Called patient to check on her. She stated she has not had any of the new medications today. She took her regular medications this morning as directed. When she returned home after being at some doctor's appointments, she rechecked her blood pressure and reading was 157/71. She took an additional Norvasc 5 mg. She states her cardiologist recommended she take an additional tablet as needed and recheck b/p after 2 hours. After she waited the 2 hours she rechecked blood pressure and had reading of 120/55. Instructed patient to contact her cardiologist office in the morning after she checks her blood pressure and takes her morning medications. She will need to let cardiologist know she has been having some elevated b/p readings and she had dizziness with sweating last night at bedtime. Patient states " I think it is the medicine I took yesterday". Explained to patient the medication she was given in the clinic yesterday should not be causing the elevated blood pressure this evening. She stated she would contact cardiologist, even though she saw her "not too long ago". Patient also instructed to call 911 or report to ER if she has sweating, pain between her shoulders/upper back, chest pain, shortness of breath, dizziness, etc. She stated "I wasn't having any chest pain last night". Explained to patient that not everyone has chest pain with heart attack, especially women. Went over the symptoms with patient once again and she voiced understanding.   "

## 2024-05-06 PROBLEM — Z13.1 ENCOUNTER FOR SCREENING FOR DIABETES MELLITUS: Status: RESOLVED | Noted: 2024-02-02 | Resolved: 2024-05-06

## 2024-06-14 ENCOUNTER — HOSPITAL ENCOUNTER (EMERGENCY)
Facility: HOSPITAL | Age: 64
Discharge: HOME | End: 2024-06-14
Payer: MEDICARE

## 2024-06-14 VITALS
HEIGHT: 64 IN | DIASTOLIC BLOOD PRESSURE: 78 MMHG | HEART RATE: 82 BPM | SYSTOLIC BLOOD PRESSURE: 201 MMHG | TEMPERATURE: 98 F | RESPIRATION RATE: 18 BRPM | WEIGHT: 180 LBS | BODY MASS INDEX: 30.73 KG/M2 | OXYGEN SATURATION: 98 %

## 2024-06-14 DIAGNOSIS — M25.562 ACUTE PAIN OF LEFT KNEE: Primary | ICD-10-CM

## 2024-06-14 DIAGNOSIS — R52 PAIN: ICD-10-CM

## 2024-06-14 PROCEDURE — 63600175 PHARM REV CODE 636 W HCPCS

## 2024-06-14 PROCEDURE — 99284 EMERGENCY DEPT VISIT MOD MDM: CPT | Mod: 25

## 2024-06-14 PROCEDURE — 96372 THER/PROPH/DIAG INJ SC/IM: CPT

## 2024-06-14 PROCEDURE — 99284 EMERGENCY DEPT VISIT MOD MDM: CPT | Mod: GF

## 2024-06-14 RX ORDER — DIPHENHYDRAMINE HYDROCHLORIDE 50 MG/ML
25 INJECTION INTRAMUSCULAR; INTRAVENOUS
Status: COMPLETED | OUTPATIENT
Start: 2024-06-14 | End: 2024-06-14

## 2024-06-14 RX ORDER — TRAMADOL HYDROCHLORIDE 50 MG/1
50 TABLET ORAL EVERY 6 HOURS PRN
Qty: 12 TABLET | Refills: 0 | Status: SHIPPED | OUTPATIENT
Start: 2024-06-14 | End: 2024-06-18 | Stop reason: SDUPTHER

## 2024-06-14 RX ORDER — ONDANSETRON HYDROCHLORIDE 2 MG/ML
4 INJECTION, SOLUTION INTRAVENOUS
Status: COMPLETED | OUTPATIENT
Start: 2024-06-14 | End: 2024-06-14

## 2024-06-14 RX ORDER — MORPHINE SULFATE 4 MG/ML
2 INJECTION, SOLUTION INTRAMUSCULAR; INTRAVENOUS
Status: COMPLETED | OUTPATIENT
Start: 2024-06-14 | End: 2024-06-14

## 2024-06-14 RX ADMIN — MORPHINE SULFATE 2 MG: 4 INJECTION, SOLUTION INTRAMUSCULAR; INTRAVENOUS at 03:06

## 2024-06-14 RX ADMIN — ONDANSETRON 4 MG: 2 INJECTION INTRAMUSCULAR; INTRAVENOUS at 03:06

## 2024-06-14 RX ADMIN — DIPHENHYDRAMINE HYDROCHLORIDE 25 MG: 50 INJECTION, SOLUTION INTRAMUSCULAR; INTRAVENOUS at 03:06

## 2024-06-14 NOTE — ED TRIAGE NOTES
Presents with c/o worsening knee pain over the past hour.  Reports chronic left knee pain but stepped down to curb in [place] and pain became excruciating.  Denies any injury.  Just got in the car and drove here.

## 2024-06-14 NOTE — DISCHARGE INSTRUCTIONS
- Rest knee for the next several days.  - Take medication as directed.  - Apply ice every 3 hours.  - The examination and treatment you have received in the Emergency Department today have been rendered on an emergency basis only and are not intended to be a substitute for an effort to provide complete medical care. You should contact your follow-up physician as it is important that you let him or her check you and report any new or remaining problems since it is impossible to recognize and treat all elements of an injury or illness in a single emergency care center visit.

## 2024-06-14 NOTE — ED PROVIDER NOTES
Encounter Date: 6/14/2024       History     Chief Complaint   Patient presents with    Knee Pain     Patient is a 64 y/o  female with a PMHx significant for multiple health relatd co-morbidities presents to the ED POV with c/o left knee pain.     The history is provided by the patient.   Knee Pain  This is a chronic problem. The current episode started 1 to 2 hours ago. The problem occurs constantly. The problem has not changed since onset.Pertinent negatives include no chest pain, no abdominal pain, no headaches and no shortness of breath. The symptoms are aggravated by walking and bending. Nothing relieves the symptoms. She has tried nothing for the symptoms.     Review of patient's allergies indicates:   Allergen Reactions    Codeine Swelling    Codeine phosphate Other (See Comments)    Tetanus and diphtheria toxoids Itching    Tetanus immune globulin Other (See Comments)     Past Medical History:   Diagnosis Date    Coronary artery disease     Depression     Diverticulosis     Fibromyalgia     Fibromyalgia     Heart disease     Hyperlipidemia     Hypertension     Hypothyroidism     Mass of adrenal gland     Osteopenia     Ventral hernia      Past Surgical History:   Procedure Laterality Date    ADENOIDECTOMY      ANKLE FRACTURE SURGERY Right     shattered ankle    APPENDECTOMY      CARDIAC SURGERY      Open Heart Surgery    CORONARY ARTERY BYPASS GRAFT      HIP FRACTURE SURGERY Right     HYSTERECTOMY      TOTAL    KNEE SURGERY Right     meniscus tear    OOPHORECTOMY      SPINE SURGERY      L4 and L5    TIBIA FRACTURE SURGERY Right     TONSILLECTOMY       Family History   Problem Relation Name Age of Onset    Diabetes Mother      Hypertension Mother      Uterine cancer Mother      Parkinsonism Father      Diabetes Father      Hypertension Father      Colon cancer Sister      Hypoglycemic Sister      Liver cancer Sister      Diabetes Sister      No Known Problems  Daughter      Diabetes Paternal Grandmother      No Known Problems Brother      Hepatitis Son          C    Post-traumatic stress disorder Son      Hypertension Son      Breast cancer Paternal Great-Grandmother       Social History     Tobacco Use    Smoking status: Former     Current packs/day: 0.00     Average packs/day: 1 pack/day for 19.0 years (19.0 ttl pk-yrs)     Types: Cigarettes     Start date:      Quit date:      Years since quittin.4     Passive exposure: Past    Smokeless tobacco: Never    Tobacco comments:     smoked off and on   Substance Use Topics    Alcohol use: Never    Drug use: Never     Review of Systems   Constitutional: Negative.    HENT: Negative.     Eyes: Negative.    Respiratory: Negative.  Negative for shortness of breath.    Cardiovascular: Negative.  Negative for chest pain.   Gastrointestinal: Negative.  Negative for abdominal pain.   Endocrine: Negative.    Genitourinary: Negative.    Musculoskeletal:  Positive for arthralgias, gait problem and myalgias. Negative for joint swelling.   Skin: Negative.    Allergic/Immunologic: Negative.    Neurological:  Negative for headaches.   Hematological: Negative.    Psychiatric/Behavioral: Negative.         Physical Exam     Initial Vitals [24 1510]   BP Pulse Resp Temp SpO2   (!) 201/78 82 16 98.2 °F (36.8 °C) 98 %      MAP       --         Physical Exam    Nursing note and vitals reviewed.  Constitutional: Vital signs are normal. She appears well-developed and well-nourished. She is not diaphoretic. She is cooperative.  Non-toxic appearance. She does not have a sickly appearance. She does not appear ill. No distress.   Cardiovascular:  Normal rate, regular rhythm, S1 normal, S2 normal, normal heart sounds, intact distal pulses and normal pulses.     Exam reveals no gallop, no S3, no S4, no distant heart sounds and no friction rub.       No murmur heard.  No systolic murmur is present.  No diastolic murmur is  present.  Pulmonary/Chest: Effort normal and breath sounds normal.   Musculoskeletal:      Left knee: No swelling, deformity, effusion, erythema, ecchymosis or lacerations. Decreased range of motion. Tenderness present over the medial joint line and lateral joint line.        Legs:      Neurological: She is alert and oriented to person, place, and time. She has normal strength and normal reflexes. She displays normal reflexes. No cranial nerve deficit or sensory deficit. She displays a negative Romberg sign. GCS eye subscore is 4. GCS verbal subscore is 5. GCS motor subscore is 6.   Skin: Skin is warm, dry and intact. Capillary refill takes less than 2 seconds. No rash noted.   Psychiatric: She has a normal mood and affect. Her speech is normal and behavior is normal. Judgment and thought content normal. Cognition and memory are normal.         Medical Screening Exam   See Full Note    ED Course   Procedures  Labs Reviewed - No data to display       Imaging Results              X-Ray Knee 3 View Left (Final result)  Result time 06/14/24 15:48:16      Final result by Tito Sierra MD (06/14/24 15:48:16)                   Impression:      No acute findings.      Electronically signed by: Tito Sierra  Date:    06/14/2024  Time:    15:48               Narrative:    EXAMINATION:  XR KNEE 3 VIEW LEFT    CLINICAL HISTORY:  Pain, unspecified    TECHNIQUE:  AP, lateral, and Merchant views of the left knee were performed.    COMPARISON:  04/19/2022    FINDINGS:  Mild-to-moderate tricompartmental osteoarthritic changes are seen.  There is no fracture.  No dislocation.  Vascular calcifications.                                    X-Rays:   Independently Interpreted Readings:   Other Readings:  Reading Physician Reading Date Result Priority  Tito Sierra MD  [phone] 6/14/2024 STAT    Narrative & Impression  EXAMINATION:  XR KNEE 3 VIEW LEFT     CLINICAL HISTORY:  Pain, unspecified     TECHNIQUE:  AP, lateral, and Merchant  views of the left knee were performed.     COMPARISON:  04/19/2022     FINDINGS:  Mild-to-moderate tricompartmental osteoarthritic changes are seen.  There is no fracture.  No dislocation.  Vascular calcifications.     Impression:     No acute findings.        Electronically signed by:Tito Sierra  Date:                                            06/14/2024  Time:                                           15:48      Medications   morphine injection 2 mg (2 mg Intramuscular Given 6/14/24 1556)   ondansetron injection 4 mg (4 mg Intramuscular Given 6/14/24 1556)   diphenhydrAMINE injection 25 mg (25 mg Intramuscular Given 6/14/24 1556)     Medical Decision Making  Amount and/or Complexity of Data Reviewed  Radiology: ordered.    Risk  Prescription drug management.               ED Course as of 06/16/24 1753 Fri Jun 14, 2024   1605 Xray images and results reviewed by me and discussed with patient. Discharge instructions given along with strict return precautions, patient verbalizes understanding.   [AC]      ED Course User Index  [AC] Nav Harley FNP                           Clinical Impression:   Final diagnoses:  [R52] Pain               Nav Harley FNP  06/16/24 1753

## 2024-06-17 ENCOUNTER — TELEPHONE (OUTPATIENT)
Dept: FAMILY MEDICINE | Facility: CLINIC | Age: 64
End: 2024-06-17
Payer: MEDICARE

## 2024-06-17 ENCOUNTER — TELEPHONE (OUTPATIENT)
Dept: ORTHOPEDICS | Facility: CLINIC | Age: 64
End: 2024-06-17
Payer: MEDICARE

## 2024-06-17 DIAGNOSIS — M25.562 ACUTE PAIN OF LEFT KNEE: Primary | ICD-10-CM

## 2024-06-17 NOTE — TELEPHONE ENCOUNTER
"Received In Basket message from reception stating that the Pt requested a referral to Dr. Dewey Gaspar due to knee injury this weekend. Contacted Pt, she reported that on 6/14/2024 she was walking on flat ground and her knee "bent backwards" and pain started immediately. Pt was evaluated at [place] [facility] and xray showed no acute fracture or dislocation. Pt reported that the pain has not resolved at all and she is unable to bear any weight on that knee. Discussed Pt's complaint with ASHA Monroe, she reviewed the Pt's ER visit note and stated that she would order a referral to Orthopedics. Notified Pt, she voiced understanding.   "

## 2024-06-17 NOTE — TELEPHONE ENCOUNTER
"Left knee pain for a couple of months. She fell and hurt her left knee on Friday and her knee "went backwards". She was seen in the [place] ED and her knee was wrapped in an ACE bandage. She will see Maria Luisa Velarde tomorrow for an evaluation.    ----- Message from Terra Heaton sent at 6/17/2024  8:05 AM CDT -----  Regarding: [facility] 6/14  Who Called: Becki Mejia    Caller is requesting a sooner appointment. Caller declined first available appointment listed below. Caller will not accept being placed on the waitlist and is requesting a message be sent to doctor.    When is the first available appointment? 7/16Hyperextended  left knee was seem in [place] ER   Options offered (Virtual Visit, Urgent Care):   Symptoms:Hyperextended  left knee       Preferred Method of Contact: Phone Call  Patient's Preferred Phone Number on File: [phone]   Best Call Back Number, if different:  Additional Information: Patient is requesting Dr Palomo  "

## 2024-06-17 NOTE — PROGRESS NOTES
Pt called stating she went to ER on 6/14/24 for left knee pain. States Xray noted osteoarthritis. She is complaining of still not being able to put any weight on it. She is requesting referral to Dr. Gaspar. Orthopedic referral made and will contact pt with appt time and date

## 2024-06-18 ENCOUNTER — OFFICE VISIT (OUTPATIENT)
Dept: ORTHOPEDICS | Facility: CLINIC | Age: 64
End: 2024-06-18
Payer: MEDICARE

## 2024-06-18 DIAGNOSIS — S89.92XA INJURY OF LEFT KNEE, INITIAL ENCOUNTER: Primary | ICD-10-CM

## 2024-06-18 DIAGNOSIS — M25.562 ACUTE PAIN OF LEFT KNEE: ICD-10-CM

## 2024-06-18 PROCEDURE — 99214 OFFICE O/P EST MOD 30 MIN: CPT | Mod: PBBFAC

## 2024-06-18 PROCEDURE — 99213 OFFICE O/P EST LOW 20 MIN: CPT | Mod: S$PBB,,,

## 2024-06-18 PROCEDURE — 4010F ACE/ARB THERAPY RXD/TAKEN: CPT | Mod: CPTII,,,

## 2024-06-18 PROCEDURE — 99999 PR PBB SHADOW E&M-EST. PATIENT-LVL IV: CPT | Mod: PBBFAC,,,

## 2024-06-18 PROCEDURE — 1159F MED LIST DOCD IN RCRD: CPT | Mod: CPTII,,,

## 2024-06-18 PROCEDURE — 3044F HG A1C LEVEL LT 7.0%: CPT | Mod: CPTII,,,

## 2024-06-18 RX ORDER — TRAMADOL HYDROCHLORIDE 50 MG/1
50 TABLET ORAL EVERY 6 HOURS PRN
Qty: 18 TABLET | Refills: 0 | Status: SHIPPED | OUTPATIENT
Start: 2024-06-18

## 2024-06-18 NOTE — PATIENT INSTRUCTIONS
MRI left knee scheduled at [facility] on 07/03/2024 @ 1:30p.m.  Dr. Dewey Gaspar's office will contact you with the results.

## 2024-06-18 NOTE — PROGRESS NOTES
CC:   Chief Complaint   Patient presents with    Left Knee - Pain          Becki Mejia is a 63 y.o. female seen today for Pain of the Left Knee  Patient reports acute left knee pain after an injury.  Patient states she fell and hyperextended her left knee approximately 2 weeks ago.  She went to the ED where x-rays showed no acute fracture or dislocation.  She was referred to [facility] for further evaluation.  Today she states she has multi bending the knee in the pain lessens when her knee is straight.  She presents nonambulatory with a wheelchair today.  She reports pain with weight-bearing.  She reports pain is mostly located in the back of the knee and radiates down into her ankle.  She denies any other fall or injury.  No other complaints today.      PAST MEDICAL HISTORY:   Past Medical History:   Diagnosis Date    Coronary artery disease     Depression     Diverticulosis     Fibromyalgia     Fibromyalgia     Heart disease     Hyperlipidemia     Hypertension     Hypothyroidism     Mass of adrenal gland     Osteopenia     Ventral hernia           PAST SURGICAL HISTORY:   Past Surgical History:   Procedure Laterality Date    ADENOIDECTOMY      ANKLE FRACTURE SURGERY Right     shattered ankle    APPENDECTOMY      CARDIAC SURGERY      Open Heart Surgery    CORONARY ARTERY BYPASS GRAFT      HIP FRACTURE SURGERY Right     HYSTERECTOMY      TOTAL    KNEE SURGERY Right     meniscus tear    OOPHORECTOMY      SPINE SURGERY      L4 and L5    TIBIA FRACTURE SURGERY Right     TONSILLECTOMY            ALLERGIES:   Review of patient's allergies indicates:   Allergen Reactions    Codeine Swelling    Codeine phosphate Other (See Comments)    Tetanus and diphtheria toxoids Itching    Tetanus immune globulin Other (See Comments)        MEDICATIONS :    Current Outpatient Medications:     allopurinoL (ZYLOPRIM) 100 MG tablet, Take 1 tablet (100 mg total) by mouth once daily., Disp: 90  tablet, Rfl: 1    amLODIPine (NORVASC) 5 MG tablet, Take 1 tablet (5 mg total) by mouth daily as needed (If blood pressure is over 120/80)., Disp: 90 tablet, Rfl: 0    aspirin (ECOTRIN) 81 MG EC tablet, Take 81 mg by mouth once daily., Disp: , Rfl:     atorvastatin (LIPITOR) 80 MG tablet, Take 1 tablet (80 mg total) by mouth once daily., Disp: 90 tablet, Rfl: 1    benazepriL (LOTENSIN) 40 MG tablet, Take 1 tablet (40 mg total) by mouth every evening., Disp: 90 tablet, Rfl: 1    buPROPion (WELLBUTRIN XL) 150 MG TB24 tablet, Take 1 tablet (150 mg total) by mouth once daily., Disp: 90 tablet, Rfl: 1    carvediloL (COREG) 25 MG tablet, Take 1 tablet (25 mg total) by mouth 2 (two) times daily., Disp: 180 tablet, Rfl: 1    coQ10, ubiquinol, 200 mg Cap, Take 1 tablet by mouth once daily. (Patient not taking: Reported on 4/29/2024), Disp: , Rfl:     diphenhydrAMINE (SOMINEX) 25 mg tablet, Take 25 mg by mouth nightly as needed for Insomnia., Disp: , Rfl:     docusate sodium (COLACE) 100 MG capsule, Take 100 mg by mouth 2 (two) times daily., Disp: , Rfl:     FEROSUL 325 mg (65 mg iron) Tab tablet, TAKE 1 TABLET ONE TIME DAILY, Disp: 90 tablet, Rfl: 1    hydrOXYzine HCL (ATARAX) 25 MG tablet, TAKE 1 TABLET (25 MG TOTAL) BY MOUTH EVERY EVENING., Disp: 90 tablet, Rfl: 1    levothyroxine (SYNTHROID) 50 MCG tablet, Take 1 tablet (50 mcg total) by mouth before breakfast., Disp: 90 tablet, Rfl: 1    LORazepam (ATIVAN) 0.5 MG tablet, Take 1 tablet (0.5 mg total) by mouth every 8 (eight) hours as needed for Anxiety (anxiety)., Disp: 21 tablet, Rfl: 0    nitroGLYCERIN (NITROSTAT) 0.4 MG SL tablet, Place 1 tablet (0.4 mg total) under the tongue every 5 (five) minutes as needed for Chest pain., Disp: 30 tablet, Rfl: 0    potassium chloride (MICRO-K) 10 MEQ CpSR, Take 1 capsule (10 mEq total) by mouth once daily., Disp: 90 capsule, Rfl: 1    spironolactone (ALDACTONE) 25 MG tablet, Take 1 tablet (25 mg total) by mouth once daily., Disp:  90 tablet, Rfl: 1    tiZANidine (ZANAFLEX) 4 MG tablet, Take 4 mg by mouth every 6 (six) hours as needed., Disp: , Rfl:     topiramate (TOPAMAX) 50 MG tablet, Take 1 tablet (50 mg total) by mouth 2 (two) times daily., Disp: 180 tablet, Rfl: 1    traMADoL (ULTRAM) 50 mg tablet, Take 1 tablet (50 mg total) by mouth every 6 (six) hours as needed for Pain., Disp: 18 tablet, Rfl: 0     SOCIAL HISTORY:   Social History     Socioeconomic History    Marital status:      Spouse name: Harper    Number of children: 3   Occupational History     Comment: DISABILITY   Tobacco Use    Smoking status: Former     Current packs/day: 0.00     Average packs/day: 1 pack/day for 19.0 years (19.0 ttl pk-yrs)     Types: Cigarettes     Start date:      Quit date:      Years since quittin.4     Passive exposure: Past    Smokeless tobacco: Never    Tobacco comments:     smoked off and on   Substance and Sexual Activity    Alcohol use: Never    Drug use: Never    Sexual activity: Yes     Partners: Male     Birth control/protection: See Surgical Hx   Social History Narrative    Lives in home with   and 2 grandchildren        FAMILY HISTORY:   Family History   Problem Relation Name Age of Onset    Diabetes Mother      Hypertension Mother      Uterine cancer Mother      Parkinsonism Father      Diabetes Father      Hypertension Father      Colon cancer Sister      Hypoglycemic Sister      Liver cancer Sister      Diabetes Sister      No Known Problems Daughter      Diabetes Paternal Grandmother      No Known Problems Brother      Hepatitis Son          C    Post-traumatic stress disorder Son      Hypertension Son      Breast cancer Paternal Great-Grandmother            PHYSICAL EXAM:      There were no vitals filed for this visit.  There is no height or weight on file to calculate BMI.    GENERAL: Well-developed, well-nourished female . The patient is alert, oriented and cooperative.    HEENT:  Normocephalic,  atraumatic.  Extraocular movements are intact bilaterally.     NECK:  Nontender with good range of motion.    LUNGS:  Clear to auscultation bilaterally.    HEART:  Regular rate and rhythm.     ABDOMEN:  Soft, non-tender, non-distended.      EXTREMITIES:  Left knee with skin clean dry and intact, mild soft tissue swelling, no joint effusion appreciated on exam today, extending it at 0°, flexion to 90° but not without pain, tenderness to palpation posterior knee, difficulty performing any special testing due to patient's pain, neurovascularly intact      RADIOGRAPHIC FINDINGS:   X-Ray Knee 3 View Left    Result Date: 6/14/2024  EXAMINATION: XR KNEE 3 VIEW LEFT CLINICAL HISTORY: Pain, unspecified TECHNIQUE: AP, lateral, and Merchant views of the left knee were performed. COMPARISON: 04/19/2022 FINDINGS: Mild-to-moderate tricompartmental osteoarthritic changes are seen.  There is no fracture.  No dislocation.  Vascular calcifications.     No acute findings. Electronically signed by: Tito Sierra Date:    06/14/2024 Time:    15:48       Patient Active Problem List    Diagnosis Date Noted    Pain and swelling of left lower leg 04/29/2024    Abnormal finding of blood chemistry, unspecified 02/02/2024    Anxiety 02/02/2024    Chest pain 02/02/2024    COVID 11/29/2023    Bilateral claudication of lower limb 09/19/2023    Family history of colon cancer 03/24/2023    Diverticulosis of colon 03/24/2023    Hyperlipidemia 03/13/2023    Colon cancer screening 01/19/2023    History of total hysterectomy with bilateral salpingo-oophorectomy (BSO) 06/13/2022    BMI 29.0-29.9,adult 06/13/2022    Other long term (current) drug therapy 06/13/2022    Abnormal blood finding 06/13/2022    Post-menopausal 06/13/2022    Mid back pain 05/25/2022    Dorsalgia, unspecified 05/02/2022    Lumbar pain 04/27/2022    Coronary artery disease involving native coronary artery of native heart 02/01/2022    History of rheumatic fever 02/01/2022    High risk  medication use 09/16/2021    Mass of adrenal gland 09/10/2021    Ventral hernia without obstruction or gangrene 08/30/2021    Heart disease 07/06/2021    Osteopenia 07/06/2021    Hypothyroidism 07/06/2021    Fibromyalgia 06/09/2021    Essential hypertension 06/09/2021    Depression 06/09/2021     IMPRESSION AND PLAN:  Acute left knee pain after injury.  Personally reviewed previous x-rays completed in the ED with mild-to-moderate tricompartmental degenerative changes, no acute fracture or dislocation.  Discussed all treatment options with the patient today.  Recommending MRI for further evaluation.  Patient is in a wheelchair today, she states she has a rolling walker at home.  Discussed continuation of ice and elevation therapy.  She was prescribed tramadol in the ED however she is out today, we will send in tramadol to her pharmacy.  We will call her with MRI results.     No follow-ups on file.       Mercedes Velarde PA-C      (Subject to voice recognition error, transcription service not allowed)

## 2024-06-21 DIAGNOSIS — F32.A DEPRESSION, UNSPECIFIED DEPRESSION TYPE: ICD-10-CM

## 2024-06-21 DIAGNOSIS — E78.5 HYPERLIPIDEMIA, UNSPECIFIED HYPERLIPIDEMIA TYPE: ICD-10-CM

## 2024-06-21 DIAGNOSIS — R51.9 ACUTE NONINTRACTABLE HEADACHE, UNSPECIFIED HEADACHE TYPE: ICD-10-CM

## 2024-06-21 DIAGNOSIS — M10.9 GOUT, UNSPECIFIED CAUSE, UNSPECIFIED CHRONICITY, UNSPECIFIED SITE: Primary | ICD-10-CM

## 2024-06-21 DIAGNOSIS — E03.9 HYPOTHYROIDISM, UNSPECIFIED TYPE: ICD-10-CM

## 2024-06-21 DIAGNOSIS — I10 ESSENTIAL HYPERTENSION: ICD-10-CM

## 2024-06-21 NOTE — TELEPHONE ENCOUNTER
Received fax from [facility].Patient has an appointment scheduled for 07/24/2024 in our office. She will be out of medication by 07/18/2024.

## 2024-06-25 RX ORDER — SPIRONOLACTONE 25 MG/1
25 TABLET ORAL DAILY
Qty: 90 TABLET | Refills: 0 | Status: SHIPPED | OUTPATIENT
Start: 2024-06-25

## 2024-06-25 RX ORDER — LEVOTHYROXINE SODIUM 50 UG/1
50 TABLET ORAL
Qty: 90 TABLET | Refills: 0 | Status: SHIPPED | OUTPATIENT
Start: 2024-06-25

## 2024-06-25 RX ORDER — AMLODIPINE BESYLATE 5 MG/1
5 TABLET ORAL DAILY PRN
Qty: 90 TABLET | Refills: 0 | Status: SHIPPED | OUTPATIENT
Start: 2024-06-25 | End: 2025-06-25

## 2024-06-25 RX ORDER — BUPROPION HYDROCHLORIDE 150 MG/1
150 TABLET ORAL DAILY
Qty: 90 TABLET | Refills: 0 | Status: SHIPPED | OUTPATIENT
Start: 2024-06-25

## 2024-06-25 RX ORDER — ATORVASTATIN CALCIUM 80 MG/1
80 TABLET, FILM COATED ORAL DAILY
Qty: 90 TABLET | Refills: 0 | Status: SHIPPED | OUTPATIENT
Start: 2024-06-25

## 2024-06-25 RX ORDER — TOPIRAMATE 50 MG/1
50 TABLET, FILM COATED ORAL 2 TIMES DAILY
Qty: 180 TABLET | Refills: 0 | Status: SHIPPED | OUTPATIENT
Start: 2024-06-25

## 2024-06-25 RX ORDER — POTASSIUM CHLORIDE 750 MG/1
10 CAPSULE, EXTENDED RELEASE ORAL DAILY
Qty: 90 CAPSULE | Refills: 0 | Status: SHIPPED | OUTPATIENT
Start: 2024-06-25

## 2024-06-25 RX ORDER — BENAZEPRIL HYDROCHLORIDE 40 MG/1
40 TABLET ORAL NIGHTLY
Qty: 90 TABLET | Refills: 0 | Status: SHIPPED | OUTPATIENT
Start: 2024-06-25

## 2024-06-25 RX ORDER — ALLOPURINOL 100 MG/1
100 TABLET ORAL DAILY
Qty: 90 TABLET | Refills: 0 | Status: SHIPPED | OUTPATIENT
Start: 2024-06-25

## 2024-07-03 ENCOUNTER — HOSPITAL ENCOUNTER (OUTPATIENT)
Dept: RADIOLOGY | Facility: HOSPITAL | Age: 64
Discharge: HOME | End: 2024-07-03
Payer: MEDICARE

## 2024-07-03 DIAGNOSIS — M25.562 ACUTE PAIN OF LEFT KNEE: ICD-10-CM

## 2024-07-03 PROCEDURE — 73721 MRI JNT OF LWR EXTRE W/O DYE: CPT | Mod: 26,LT,, | Performed by: RADIOLOGY

## 2024-07-03 PROCEDURE — 73721 MRI JNT OF LWR EXTRE W/O DYE: CPT | Mod: TC,LT

## 2024-07-08 ENCOUNTER — TELEPHONE (OUTPATIENT)
Dept: ORTHOPEDICS | Facility: CLINIC | Age: 64
End: 2024-07-08
Payer: MEDICARE

## 2024-07-08 NOTE — TELEPHONE ENCOUNTER
----- Message from JUAN Chavarria sent at 7/3/2024  2:32 PM CDT -----  Mri with meniscus tear. Please schedule patient with Dr. Gaspar.  ----- Message -----  From: Interface, Rad Results In  Sent: 7/3/2024   2:25 PM CDT  To: JUAN Chavarria

## 2024-07-09 ENCOUNTER — HOSPITAL ENCOUNTER (OUTPATIENT)
Dept: RADIOLOGY | Facility: HOSPITAL | Age: 64
Discharge: HOME | End: 2024-07-09
Attending: NURSE PRACTITIONER
Payer: MEDICARE

## 2024-07-09 DIAGNOSIS — S22.49XA RIB FRACTURES: ICD-10-CM

## 2024-07-09 DIAGNOSIS — M25.559 HIP PAIN: ICD-10-CM

## 2024-07-09 PROCEDURE — 73502 X-RAY EXAM HIP UNI 2-3 VIEWS: CPT | Mod: TC,RT

## 2024-07-09 PROCEDURE — 71100 X-RAY EXAM RIBS UNI 2 VIEWS: CPT | Mod: TC,RT

## 2024-07-12 ENCOUNTER — PATIENT MESSAGE (OUTPATIENT)
Dept: FAMILY MEDICINE | Facility: CLINIC | Age: 64
End: 2024-07-12
Payer: MEDICARE

## 2024-07-16 DIAGNOSIS — I10 ESSENTIAL HYPERTENSION: ICD-10-CM

## 2024-07-16 RX ORDER — CARVEDILOL 25 MG/1
25 TABLET ORAL 2 TIMES DAILY
Qty: 180 TABLET | Refills: 0 | Status: SHIPPED | OUTPATIENT
Start: 2024-07-16

## 2024-07-16 RX ORDER — KETOROLAC TROMETHAMINE 10 MG/1
TABLET, FILM COATED ORAL
COMMUNITY
Start: 2024-07-09

## 2024-07-25 ENCOUNTER — OFFICE VISIT (OUTPATIENT)
Dept: ORTHOPEDICS | Facility: CLINIC | Age: 64
End: 2024-07-25
Payer: MEDICARE

## 2024-07-25 DIAGNOSIS — M25.562 ACUTE PAIN OF LEFT KNEE: ICD-10-CM

## 2024-07-25 DIAGNOSIS — M17.12 PRIMARY OSTEOARTHRITIS OF LEFT KNEE: Primary | ICD-10-CM

## 2024-07-25 PROCEDURE — 99999 PR PBB SHADOW E&M-EST. PATIENT-LVL III: CPT | Mod: PBBFAC,,, | Performed by: ORTHOPAEDIC SURGERY

## 2024-07-25 PROCEDURE — 20610 DRAIN/INJ JOINT/BURSA W/O US: CPT | Mod: PBBFAC | Performed by: ORTHOPAEDIC SURGERY

## 2024-07-25 PROCEDURE — 99999PBSHW PR PBB SHADOW TECHNICAL ONLY FILED TO HB: Mod: PBBFAC,,,

## 2024-07-25 PROCEDURE — 99213 OFFICE O/P EST LOW 20 MIN: CPT | Mod: PBBFAC | Performed by: ORTHOPAEDIC SURGERY

## 2024-07-25 RX ORDER — TRIAMCINOLONE ACETONIDE 40 MG/ML
40 INJECTION, SUSPENSION INTRA-ARTICULAR; INTRAMUSCULAR
Status: DISCONTINUED | OUTPATIENT
Start: 2024-07-25 | End: 2024-07-25 | Stop reason: HOSPADM

## 2024-07-25 RX ORDER — BUPIVACAINE HYDROCHLORIDE 5 MG/ML
3 INJECTION, SOLUTION PERINEURAL
Status: DISCONTINUED | OUTPATIENT
Start: 2024-07-25 | End: 2024-07-25 | Stop reason: HOSPADM

## 2024-07-25 RX ADMIN — TRIAMCINOLONE ACETONIDE 40 MG: 400 INJECTION, SUSPENSION INTRA-ARTICULAR; INTRAMUSCULAR at 09:07

## 2024-07-25 RX ADMIN — BUPIVACAINE HYDROCHLORIDE 3 ML: 5 INJECTION, SOLUTION PERINEURAL at 09:07

## 2024-07-25 NOTE — PROGRESS NOTES
63 y.o. Female returns to clinic for a follow up visit regarding     ICD-10-CM ICD-9-CM   1. Primary osteoarthritis of left knee  M17.12 715.16   2. Acute pain of left knee  M25.562 719.46        Patient recently saw Maria Luisa for left knee pain following a hyperextension injury to her knee.   An Mri was obtained.   She has been wearing a hinged knee brace and feels that has helped provide stability of her knee.   She does state that the pain has gotten some better since her last visit.  Patient is here today to review the results of her MRI and discuss treatment options moving forward.        Past Medical History:   Diagnosis Date    Coronary artery disease     Depression     Diverticulosis     Fibromyalgia     Fibromyalgia     Heart disease     Hyperlipidemia     Hypertension     Hypothyroidism     Mass of adrenal gland     Osteopenia     Ventral hernia      Past Surgical History:   Procedure Laterality Date    ADENOIDECTOMY      ANKLE FRACTURE SURGERY Right     shattered ankle    APPENDECTOMY      CARDIAC SURGERY      Open Heart Surgery    CORONARY ARTERY BYPASS GRAFT      HIP FRACTURE SURGERY Right     HYSTERECTOMY      TOTAL    KNEE SURGERY Right     meniscus tear    OOPHORECTOMY      SPINE SURGERY      L4 and L5    TIBIA FRACTURE SURGERY Right     TONSILLECTOMY           PHYSICAL EXAMINATION:    General    Constitutional: She is oriented to person, place, and time. She appears well-nourished.   HENT:   Head: Normocephalic and atraumatic.   Eyes: Pupils are equal, round, and reactive to light.   Neck: Neck supple.   Cardiovascular:  Normal rate and regular rhythm.            Pulmonary/Chest: Effort normal. No respiratory distress.   Abdominal: There is no abdominal tenderness. There is no guarding.   Neurological: She is alert and oriented to person, place, and time. She has normal reflexes.   Psychiatric: She has a normal mood and affect. Her behavior is normal. Judgment and thought content normal.              Left Knee Exam     Inspection   Erythema: absent  Swelling: absent  Bruising: absent    Tenderness   The patient tender to palpation of the patella.    Crepitus   The patient has crepitus of the patella.    Range of Motion   Extension:  normal   Flexion:  normal     Tests   Meniscus   Ivory:  Medial - positive   Stability   Lachman: normal (-1 to 2mm)   Patella   Passive Patellar Tilt: lateral tilt  Patellar Tracking: normal  Q-Angle at 90 degrees: normal  Patellar Grind: positive    Other   Sensation: normal    Vascular Exam       Left Pulses  Dorsalis Pedis:      2+  Posterior Tibial:      2+            IMAGING:  X-Ray Ribs 2 View Right    Result Date: 7/9/2024  EXAMINATION: XR RIBS 2 VIEW RIGHT CLINICAL HISTORY: Multiple fractures of ribs, unspecified side, initial encounter for closed fracture COMPARISON: 27 March 2020 FINDINGS: No evidence of fracture seen. No subpleural hematoma or pneumothorax is present.     No evidence of rib abnormality demonstrated. Electronically signed by: Crescencio North Date:    07/09/2024 Time:    15:18    X-Ray Hip 2 or 3 views Right with Pelvis when performed    Result Date: 7/9/2024  EXAMINATION: XR HIP WITH PELVIS WHEN PERFORMED 2 OR 3 VIEWS RIGHT CLINICAL HISTORY: Pain in unspecified hip COMPARISON: Pelvis/hip x-ray May 2, 2016 TECHNIQUE: Frontal and frogleg lateral views of the right hip. FINDINGS: Redemonstration of 2 orthopedic screws extending across the right femoral neck and head, similar to prior.  Similar subcapital femoral neck fracture deformity.  No acute fracture.  Mild degenerative change of the right hip.  Atherosclerotic calcification demonstrated.     As above. Point of Service: [facility] Electronically signed by: Arthur Bess Date:    07/09/2024 Time:    14:51    MRI Knee Without Contrast Left    Result Date: 7/3/2024  EXAMINATION: MRI KNEE WITHOUT CONTRAST LEFT CLINICAL HISTORY: left knee pain; Pain in left knee TECHNIQUE: Axial  sagittal and coronal imaging of the left knee is performed using T1, proton density, proton density fat-sat, STIR and gradient sequences. COMPARISON: None available FINDINGS: The anterior and posterior cruciate ligaments are intact without evidence of tear. There is increased signal in the posterior horn and body of the medial meniscus that does not clearly extend to the meniscal surface.  There is suggestion of partial tearing of the posterior horn root ligament near the insertion.  The meniscus is slightly extruded from the joint. Lateral meniscus appears normal. There is thinning of articular cartilage of the medial compartment. The medial and lateral collateral ligament complexes are intact without evidence of abnormality. The extensor mechanism is intact and appears within normal limits. No abnormal osseous of marrow signal or edema is seen. No focal cartilage defect is present.     Tear and or degeneration of posterior horn body medial meniscus.  Mild knee osteoarthrosis.  No other evidence of abnormality demonstrated Electronically signed by: Crescencio North Date:    07/03/2024 Time:    14:23   Per my read there is significant chondral wear in the lateral aspect of the patella and lateral tilt of the patella.    ASSESSMENT:      ICD-10-CM ICD-9-CM   1. Primary osteoarthritis of left knee  M17.12 715.16   2. Acute pain of left knee  M25.562 719.46       PLAN:     -Findings and treatment options were discussed with the patient  -All questions answered      I would recommend an injection and a home exercise program.  I do not think her meniscus tear is significant I think this is just some age-related changes seen in the medial meniscus posterior horn.  Knee appears to be stable on exam today.  No increased laxity to valgus stress.  Okay to wean out of the knee brace I would recommend a compression sleeve more so than anything injection given today home exercises given today.  If no better, consider visco  injections  There are no Patient Instructions on file for this visit.      Orders Placed This Encounter   Procedures    Large Joint Aspiration/Injection: L knee         Large Joint Aspiration/Injection: L knee    Date/Time: 7/25/2024 9:15 AM    Performed by: Dewey Gaspar MD  Authorized by: Dewey Gaspar MD    Consent Done?:  Yes (Verbal)  Indications:  Pain    Details:  Needle Size:  22 G  Approach:  Superior  Location:  Knee  Site:  L knee  Medications:  3 mL BUPivacaine 0.5 % (5 mg/mL); 40 mg triamcinolone acetonide 40 mg/mL  Patient tolerance:  Patient tolerated the procedure well with no immediate complications

## 2024-08-09 DIAGNOSIS — Z71.89 COMPLEX CARE COORDINATION: ICD-10-CM

## 2024-08-20 ENCOUNTER — TELEPHONE (OUTPATIENT)
Dept: ORTHOPEDICS | Facility: CLINIC | Age: 64
End: 2024-08-20
Payer: MEDICARE

## 2024-08-20 DIAGNOSIS — M17.12 PRIMARY OSTEOARTHRITIS OF LEFT KNEE: Primary | ICD-10-CM

## 2024-08-20 NOTE — TELEPHONE ENCOUNTER
----- Message from Rhina Aguilar sent at 8/20/2024  8:04 AM CDT -----  Pt was in last month for a shot in knee. It did not work. He mentioned another option for her. She states she is hurting and wants to try something else. [phone].  Who Called: Becki Mejia    Caller is requesting assistance/information from provider's office.    Symptoms (please be specific): knee pain   How long has patient had these symptoms:    List of preferred pharmacies on file (remove unneeded): [unfilled]  If different, enter pharmacy into here including location and phone number:         Patient's Preferred Phone [place] on File: [phone]   Best Call Back Number, if different:  Additional Information:

## 2024-08-26 NOTE — PATIENT INSTRUCTIONS
Counseling and Referral of Other Preventative  (Italic type indicates deductible and co-insurance are waived)    Patient Name: Becki Mejia  Today's Date: 8/30/2024    Health Maintenance       Date Due Completion Date    Hepatitis C Screening Never done ---    HIV Screening Never done ---    RSV Vaccine (Age 60+ and Pregnant patients) (1 - 1-dose 60+ series) Never done ---    Shingles Vaccine (2 of 2) 10/07/2022 8/12/2022    COVID-19 Vaccine (5 - 2023-24 season) 09/01/2023 8/12/2022    DEXA Scan 07/15/2024 7/15/2022    Pneumococcal Vaccines (Age 65+) (1 of 1 - PCV) Never done ---    Influenza Vaccine (1) 09/01/2024 10/13/2023    Lipid Panel 01/18/2025 1/18/2024    Mammogram 02/28/2025 2/28/2024    Aspirin/Antiplatelet Therapy 06/14/2025 6/14/2024    Hemoglobin A1c (Diabetic Prevention Screening) 01/18/2027 1/18/2024    Colorectal Cancer Screening 03/24/2028 3/24/2023        Orders Placed This Encounter   Procedures    DXA Bone Density Axial Skeleton 1 or more sites       Counseling and Referral of Other Preventative  (Italic type indicates deductible and co-insurance are waived)    Patient Name: Becki Mejia  Today's Date: 8/30/2024    Health Maintenance       Date Due Completion Date    Hepatitis C Screening Never done ---    HIV Screening Never done ---    RSV Vaccine (Age 60+ and Pregnant patients) (1 - 1-dose 60+ series) Never done ---    Shingles Vaccine (2 of 2) 10/07/2022 8/12/2022    COVID-19 Vaccine (5 - 2023-24 season) 09/01/2023 8/12/2022    DEXA Scan 07/15/2024 7/15/2022    Pneumococcal Vaccines (Age 65+) (1 of 1 - PCV) Never done ---    Influenza Vaccine (1) 09/01/2024 10/13/2023    Lipid Panel 01/18/2025 1/18/2024    Mammogram 02/28/2025 2/28/2024    Aspirin/Antiplatelet Therapy 06/14/2025 6/14/2024    Hemoglobin A1c (Diabetic Prevention Screening) 01/18/2027 1/18/2024    Colorectal Cancer Screening 03/24/2028 3/24/2023        Orders Placed This Encounter   Procedures    DXA Bone Density  Axial Skeleton 1 or more sites       The following information is provided to all patients.  This information is to help you find resources for any of the problems found today that may be affecting your health:                  Living healthy guide: ms.gov    Understanding Diabetes: www.diabetes.org      Eating healthy: www.cdc.gov/healthyweight      CDC home safety checklist: www.cdc.gov/steadi/patient.html      Agency on Aging: ms.gov    Alcoholics anonymous (AA): www.aa.org      Physical Activity: www.misa.nih.gov/zo0pwav      Tobacco use: ms.gov      Counseling and Referral of Other Preventative  (Italic type indicates deductible and co-insurance are waived)    Patient Name: Becki Mejia  Today's Date: 8/30/2024    Health Maintenance       Date Due Completion Date    Hepatitis C Screening Never done ---    HIV Screening Never done ---    RSV Vaccine (Age 60+ and Pregnant patients) (1 - 1-dose 60+ series) Never done ---    Shingles Vaccine (2 of 2) 10/07/2022 8/12/2022    COVID-19 Vaccine (5 - 2023-24 season) 09/01/2023 8/12/2022    DEXA Scan 07/15/2024 7/15/2022    Pneumococcal Vaccines (Age 65+) (1 of 1 - PCV) Never done ---    Influenza Vaccine (1) 09/01/2024 10/13/2023    Lipid Panel 01/18/2025 1/18/2024    Mammogram 02/28/2025 2/28/2024    Aspirin/Antiplatelet Therapy 06/14/2025 6/14/2024    Hemoglobin A1c (Diabetic Prevention Screening) 01/18/2027 1/18/2024    Colorectal Cancer Screening 03/24/2028 3/24/2023        Orders Placed This Encounter   Procedures    DXA Bone Density Axial Skeleton 1 or more sites     The following information is provided to all patients.  This information is to help you find resources for any of the problems found today that may be affecting your health:                  Living healthy guide: ms.gov    Understanding Diabetes: www.diabetes.org      Eating healthy: www.cdc.gov/healthyweight      CDC home safety checklist: www.cdc.gov/steadi/patient.html      Agency on Aging:  ms.gov    Alcoholics anonymous (AA): www.aa.org      Physical Activity: www.misa.nih.gov/sg7llsx      Tobacco use: ms.gov

## 2024-08-26 NOTE — PROGRESS NOTES
"  Becki Mejia presented for a  Medicare AWV and comprehensive Health Risk Assessment today. The following components were reviewed and updated:    Medical history  Family History  Social history  Allergies and Current Medications  Health Risk Assessment  Health Maintenance  Care Team         ** See Completed Assessments for Annual Wellness Visit within the encounter summary.**         The following assessments were completed:  Living Situation  CAGE  Depression Screening  Timed Get Up and Go  Whisper Test  Cognitive Function Screening  Nutrition Screening  ADL Screening  PAQ Screening        Opioid documentationdoes have a current opioid prescription.      Patient accepted further discussion regarding opioid medication use.      Patient is currently taking tramadol narcotic for knee pain.        Pain level today is 4/10.       In addition to narcotic pain medications, patient is also using Flexeril for pain control.       Patient is not followed by a specialist currently for their pain and will not be referred today.       Patient's opioid risk potential based on ORT-OUD tool:       Reinaldo each box that applies   No   Yes     Family history of substance abuse   Alcohol [x] []   Illegal drugs [x] []   Rx drugs [x] []     Personal history of substance abuse   Alcohol [x] []   Illegal drugs [x] []   Rx drugs [x] []     Age between 16-45 years   [x]   []     Patient with ADD, OCD, Bipolar disorder, schizoprenia   [x]   []     Patient with depression   [x]   []                         Scoring total                                                                 Non-opioid treatment options have been discussed today and added to the patient's after visit summary.                   Vitals:    08/30/24 0756   BP: (!) 140/74   BP Location: Left arm   Patient Position: Sitting   Pulse: 64   Resp: 20   Temp: 97.9 °F (36.6 °C)   SpO2: 99%   Weight: 76.7 kg (169 lb 0.3 oz)   Height: 5' 4" (1.626 m)     Body mass index is " 29.01 kg/m².  Physical Exam  Vitals and nursing note reviewed.   Constitutional:       General: She is awake.      Appearance: Normal appearance.   HENT:      Head: Normocephalic.      Right Ear: Tympanic membrane, ear canal and external ear normal.      Left Ear: Tympanic membrane, ear canal and external ear normal.      Nose: Nose normal.      Mouth/Throat:      Lips: Pink.      Mouth: Mucous membranes are moist.      Pharynx: Oropharynx is clear. Uvula midline.   Cardiovascular:      Rate and Rhythm: Normal rate and regular rhythm.      Heart sounds: Normal heart sounds, S1 normal and S2 normal.   Pulmonary:      Effort: Pulmonary effort is normal. No respiratory distress.      Breath sounds: Normal breath sounds. No decreased breath sounds, wheezing, rhonchi or rales.   Abdominal:      General: Bowel sounds are normal.      Palpations: Abdomen is soft.      Tenderness: There is no abdominal tenderness.   Musculoskeletal:      Cervical back: Normal range of motion.   Skin:     General: Skin is warm.      Capillary Refill: Capillary refill takes less than 2 seconds.   Neurological:      Mental Status: She is alert and oriented to person, place, and time.   Psychiatric:         Thought Content: Thought content does not include homicidal or suicidal ideation. Thought content does not include homicidal or suicidal plan.           Diagnoses and health risks identified today and associated recommendations/orders:    Problem List Items Addressed This Visit          Neuro    Memory loss     Referral to neurology for further evaluation         Relevant Orders    Ambulatory referral/consult to Neurology    Migraine without aura and with status migrainosus, not intractable     Symptoms controlled well. Continue current medication regimen. Topamax refilled         Relevant Medications    topiramate (TOPAMAX) 50 MG tablet       Psychiatric    Depression     Increase Wellbutrin to 300mg one tablet daily. Denies any thoughts of  suicide or self harm.      Reviewed treatment plan and medication side effects/risk/benefits/directions on taking medications. Instructed patient it could take up to 3- 4 weeks before they see full benefit of medication. Patient denies suicidal or homicidal ideations. Should these symptoms develop, encouraged to proceed to the closest ER for additional evaluation and treatment. Instructed patient to return to clinic in 3-4 weeks to discuss effectiveness of drug and any side effects. Patient verbalized understanding of treatment plan and denies any questions.         Relevant Medications    buPROPion (WELLBUTRIN XL) 300 MG 24 hr tablet       Cardiac/Vascular    Essential hypertension     Monitor BP daily and keep BP daily log to bring to next visit. Exercise at least 5 times a week. Keep scheduled appts. RTC sooner if BP is staying <120/70. Dash diet.    DASH- includes foods rich in K+, calcium and Magnesium.The diet limits foods high in sodium, saturated fats and added sugars. This is a flexible balanced eating plan that helps create heart healthy eating style for life. Diet is rich in vegetable, whole grains and fruits. It includes fat free or low fat dairy products, fish, poultry, nuts. Chose foods high in K+, calcium, magnesium, fiber, protein, and low in saturated fats and sodium.          Relevant Medications    amLODIPine (NORVASC) 5 MG tablet    benazepriL (LOTENSIN) 40 MG tablet    spironolactone (ALDACTONE) 25 MG tablet    Coronary artery disease involving native coronary artery of native heart     S/p CABG 8/31/2017 for LM disease         Hyperlipidemia     Goal LDL is less than 70. Continue current meds and low fat/low cholesterol diet with increased exercise as tolerated. Will follow up with labs. Patient to follow up in 3 months or as needed    A few changes in your diet can reduce cholesterol and improve your heart health. Saturated fats, found primarily in red meat and full-fat dairy products, raise  "your total cholesterol. Decreasing your consumption of saturated fats can reduce your low-density lipoprotein (LDL) cholesterol. rans fats, sometimes listed on food labels as "partially hydrogenated vegetable oil," are often used in margarines and store-bought cookies, crackers and cakes.     Trans fats raise overall cholesterol levels. Omega-3 fatty acids don't affect LDL cholesterol. But they have other heart-healthy benefits, including reducing blood pressure. Foods with omega-3 fatty acids include salmon, mackerel, herring, walnuts and flaxseeds.Soluble fiber can reduce the absorption of cholesterol into your bloodstream. Soluble fiber is found in such foods as oatmeal, kidney beans, [place] sprouts, apples and pears.  at least 30 minutes of exercise five times a week or vigorous aerobic activity for 20 minutes three times a week if tolerated.           Relevant Medications    atorvastatin (LIPITOR) 80 MG tablet       Renal/    History of total hysterectomy with bilateral salpingo-oophorectomy (BSO)     HX hysterectomy         Asymptomatic menopausal state     Symptoms controlled         Relevant Orders    DXA Bone Density Axial Skeleton 1 or more sites       Endocrine    Hypothyroidism     Take medication with a glass of water on an empty stomach daily, wait 30 minutes before consuming anything else. Separate from vitamins, iron  by 6 hours          Relevant Medications    levothyroxine (SYNTHROID) 50 MCG tablet    BMI 29.0-29.9,adult     Diet and exercise discussed with pt            Orthopedic    Chronic pain of left knee     Symptoms controlled. Diclofenac refilled         Relevant Medications    diclofenac (VOLTAREN) 75 MG EC tablet    Gout     Symptoms currently controlled. Continue current medication regimen         Relevant Medications    allopurinoL (ZYLOPRIM) 100 MG tablet       Other    Encounter for subsequent annual wellness visit (AWV) in Medicare patient - Primary     Physical assessment WNL   "       Encounter for preventive health examination     Physical assessment WNL            Provided Becki with a 5-10 year written screening schedule and personal prevention plan. Recommendations were developed using the USPSTF age appropriate recommendations. Education, counseling, and referrals were provided as needed. After Visit Summary printed and given to patient which includes a list of additional screenings\tests needed.    Follow up for yearly annual wellness visit  Declined vaccines today and Hep C Screening  I offered to discuss advanced care planning, including how to pick a person who would make decisions for you if you were unable to make them for yourself, called a health care power of , and what kind of decisions you might make such as use of life sustaining treatments such as ventilators and tube feeding when faced with a life limiting illness recorded on a living will that they will need to know. (How you want to be cared for as you near the end of your natural life)     X Patient is interested in learning more about how to make advanced directives.  I provided them paperwork and offered to discuss this with them.

## 2024-08-30 ENCOUNTER — OFFICE VISIT (OUTPATIENT)
Dept: FAMILY MEDICINE | Facility: CLINIC | Age: 64
End: 2024-08-30
Payer: MEDICARE

## 2024-08-30 VITALS
DIASTOLIC BLOOD PRESSURE: 74 MMHG | TEMPERATURE: 98 F | OXYGEN SATURATION: 99 % | WEIGHT: 169 LBS | HEIGHT: 64 IN | SYSTOLIC BLOOD PRESSURE: 140 MMHG | BODY MASS INDEX: 28.85 KG/M2 | HEART RATE: 64 BPM | RESPIRATION RATE: 20 BRPM

## 2024-08-30 DIAGNOSIS — Z90.710 HISTORY OF TOTAL HYSTERECTOMY WITH BILATERAL SALPINGO-OOPHORECTOMY (BSO): ICD-10-CM

## 2024-08-30 DIAGNOSIS — Z90.722 HISTORY OF TOTAL HYSTERECTOMY WITH BILATERAL SALPINGO-OOPHORECTOMY (BSO): ICD-10-CM

## 2024-08-30 DIAGNOSIS — I25.10 CORONARY ARTERY DISEASE INVOLVING NATIVE CORONARY ARTERY OF NATIVE HEART WITHOUT ANGINA PECTORIS: ICD-10-CM

## 2024-08-30 DIAGNOSIS — M25.562 CHRONIC PAIN OF LEFT KNEE: ICD-10-CM

## 2024-08-30 DIAGNOSIS — I10 ESSENTIAL HYPERTENSION: ICD-10-CM

## 2024-08-30 DIAGNOSIS — Z00.00 ENCOUNTER FOR SUBSEQUENT ANNUAL WELLNESS VISIT (AWV) IN MEDICARE PATIENT: Primary | ICD-10-CM

## 2024-08-30 DIAGNOSIS — G89.29 CHRONIC PAIN OF LEFT KNEE: ICD-10-CM

## 2024-08-30 DIAGNOSIS — M10.9 GOUT, UNSPECIFIED CAUSE, UNSPECIFIED CHRONICITY, UNSPECIFIED SITE: ICD-10-CM

## 2024-08-30 DIAGNOSIS — F32.A DEPRESSION, UNSPECIFIED DEPRESSION TYPE: ICD-10-CM

## 2024-08-30 DIAGNOSIS — R41.3 MEMORY LOSS: ICD-10-CM

## 2024-08-30 DIAGNOSIS — Z78.0 ASYMPTOMATIC MENOPAUSAL STATE: ICD-10-CM

## 2024-08-30 DIAGNOSIS — G43.001 MIGRAINE WITHOUT AURA AND WITH STATUS MIGRAINOSUS, NOT INTRACTABLE: ICD-10-CM

## 2024-08-30 DIAGNOSIS — E03.9 HYPOTHYROIDISM, UNSPECIFIED TYPE: ICD-10-CM

## 2024-08-30 DIAGNOSIS — Z90.79 HISTORY OF TOTAL HYSTERECTOMY WITH BILATERAL SALPINGO-OOPHORECTOMY (BSO): ICD-10-CM

## 2024-08-30 DIAGNOSIS — Z00.00 ENCOUNTER FOR PREVENTIVE HEALTH EXAMINATION: ICD-10-CM

## 2024-08-30 DIAGNOSIS — E78.5 HYPERLIPIDEMIA, UNSPECIFIED HYPERLIPIDEMIA TYPE: ICD-10-CM

## 2024-08-30 RX ORDER — TOPIRAMATE 50 MG/1
50 TABLET, FILM COATED ORAL 2 TIMES DAILY
Qty: 180 TABLET | Refills: 1 | Status: SHIPPED | OUTPATIENT
Start: 2024-08-30

## 2024-08-30 RX ORDER — LEVOTHYROXINE SODIUM 50 UG/1
50 TABLET ORAL
Qty: 90 TABLET | Refills: 1 | Status: SHIPPED | OUTPATIENT
Start: 2024-08-30

## 2024-08-30 RX ORDER — BENAZEPRIL HYDROCHLORIDE 40 MG/1
40 TABLET ORAL NIGHTLY
Qty: 90 TABLET | Refills: 1 | Status: SHIPPED | OUTPATIENT
Start: 2024-08-30

## 2024-08-30 RX ORDER — ATORVASTATIN CALCIUM 80 MG/1
80 TABLET, FILM COATED ORAL DAILY
Qty: 90 TABLET | Refills: 1 | Status: SHIPPED | OUTPATIENT
Start: 2024-08-30

## 2024-08-30 RX ORDER — BUPROPION HYDROCHLORIDE 300 MG/1
300 TABLET ORAL DAILY
Qty: 30 TABLET | Refills: 11 | Status: SHIPPED | OUTPATIENT
Start: 2024-08-30

## 2024-08-30 RX ORDER — AMLODIPINE BESYLATE 5 MG/1
5 TABLET ORAL DAILY PRN
Qty: 90 TABLET | Refills: 1 | Status: SHIPPED | OUTPATIENT
Start: 2024-08-30

## 2024-08-30 RX ORDER — SPIRONOLACTONE 25 MG/1
25 TABLET ORAL DAILY
Qty: 90 TABLET | Refills: 1 | Status: SHIPPED | OUTPATIENT
Start: 2024-08-30

## 2024-08-30 RX ORDER — DICLOFENAC SODIUM 75 MG/1
75 TABLET, DELAYED RELEASE ORAL 2 TIMES DAILY
Qty: 60 TABLET | Refills: 1 | Status: SHIPPED | OUTPATIENT
Start: 2024-08-30

## 2024-08-30 RX ORDER — ALLOPURINOL 100 MG/1
100 TABLET ORAL DAILY
Qty: 90 TABLET | Refills: 1 | Status: SHIPPED | OUTPATIENT
Start: 2024-08-30

## 2024-08-30 NOTE — ASSESSMENT & PLAN NOTE
"Goal LDL is less than 70. Continue current meds and low fat/low cholesterol diet with increased exercise as tolerated. Will follow up with labs. Patient to follow up in 3 months or as needed    A few changes in your diet can reduce cholesterol and improve your heart health. Saturated fats, found primarily in red meat and full-fat dairy products, raise your total cholesterol. Decreasing your consumption of saturated fats can reduce your low-density lipoprotein (LDL) cholesterol. rans fats, sometimes listed on food labels as "partially hydrogenated vegetable oil," are often used in margarines and store-bought cookies, crackers and cakes.     Trans fats raise overall cholesterol levels. Omega-3 fatty acids don't affect LDL cholesterol. But they have other heart-healthy benefits, including reducing blood pressure. Foods with omega-3 fatty acids include salmon, mackerel, herring, walnuts and flaxseeds.Soluble fiber can reduce the absorption of cholesterol into your bloodstream. Soluble fiber is found in such foods as oatmeal, kidney beans, [place] sprouts, apples and pears.  at least 30 minutes of exercise five times a week or vigorous aerobic activity for 20 minutes three times a week if tolerated.    "

## 2024-08-30 NOTE — ASSESSMENT & PLAN NOTE
Increase Wellbutrin to 300mg one tablet daily. Denies any thoughts of suicide or self harm.      Reviewed treatment plan and medication side effects/risk/benefits/directions on taking medications. Instructed patient it could take up to 3- 4 weeks before they see full benefit of medication. Patient denies suicidal or homicidal ideations. Should these symptoms develop, encouraged to proceed to the closest ER for additional evaluation and treatment. Instructed patient to return to clinic in 3-4 weeks to discuss effectiveness of drug and any side effects. Patient verbalized understanding of treatment plan and denies any questions.

## 2024-09-03 ENCOUNTER — TELEPHONE (OUTPATIENT)
Dept: ORTHOPEDICS | Facility: CLINIC | Age: 64
End: 2024-09-03
Payer: MEDICARE

## 2024-09-03 NOTE — TELEPHONE ENCOUNTER
Patient had questions about what she could and could not do following her visco injection. All questions were answered.     ----- Message from Rob Londono sent at 9/3/2024  9:08 AM CDT -----  Regarding: Injection Questions  Who Called: Becki Mejia    Caller is requesting assistance/information from provider's office.      Preferred Method of Contact: Phone Call  Patient's Preferred Phone Number on File: [phone]   Best Call Back Number, if different:  Additional Information: Pt would like information of injections she gets tomorrow. Would like a call back.

## 2024-09-04 ENCOUNTER — OFFICE VISIT (OUTPATIENT)
Dept: ORTHOPEDICS | Facility: CLINIC | Age: 64
End: 2024-09-04
Payer: MEDICARE

## 2024-09-04 DIAGNOSIS — M17.12 PRIMARY OSTEOARTHRITIS OF LEFT KNEE: Primary | ICD-10-CM

## 2024-09-04 PROCEDURE — 99999 PR PBB SHADOW E&M-EST. PATIENT-LVL III: CPT | Mod: PBBFAC,,, | Performed by: NURSE PRACTITIONER

## 2024-09-04 PROCEDURE — 99999PBSHW PR PBB SHADOW TECHNICAL ONLY FILED TO HB: Mod: JZ,PBBFAC,,

## 2024-09-04 PROCEDURE — 99213 OFFICE O/P EST LOW 20 MIN: CPT | Mod: PBBFAC | Performed by: NURSE PRACTITIONER

## 2024-09-04 PROCEDURE — 20610 DRAIN/INJ JOINT/BURSA W/O US: CPT | Mod: PBBFAC | Performed by: NURSE PRACTITIONER

## 2024-09-04 RX ADMIN — Medication 48 MG: at 10:09

## 2024-09-04 NOTE — PROGRESS NOTES
CC:  Knee pain    64 y.o. Female returns to clinic for a follow up visit regarding knee pain.       Patient has a steroid injection in her left knee 7/25/2024. That injection failed to provide her adequate, long-term relief.   She is here today for a Synvisc-One injection.        Past Medical History:   Diagnosis Date    Coronary artery disease     Depression     Diverticulosis     Fibromyalgia     Fibromyalgia     Heart disease     Hyperlipidemia     Hypertension     Hypothyroidism     Mass of adrenal gland     Osteopenia     Ventral hernia      Past Surgical History:   Procedure Laterality Date    ADENOIDECTOMY      ANKLE FRACTURE SURGERY Right     shattered ankle    APPENDECTOMY      CARDIAC SURGERY      Open Heart Surgery    CORONARY ARTERY BYPASS GRAFT      HIP FRACTURE SURGERY Right     HYSTERECTOMY      TOTAL    KNEE SURGERY Right     meniscus tear    OOPHORECTOMY      SPINE SURGERY      L4 and L5    TIBIA FRACTURE SURGERY Right     TONSILLECTOMY           PHYSICAL EXAMINATION:  LMP  (LMP Unknown)   General    Constitutional: She is oriented to person, place, and time. She appears well-nourished.   HENT:   Head: Normocephalic and atraumatic.   Eyes: Pupils are equal, round, and reactive to light.   Neck: Neck supple.   Cardiovascular:  Normal rate and regular rhythm.            Pulmonary/Chest: Effort normal. No respiratory distress.   Abdominal: There is no abdominal tenderness. There is no guarding.   Neurological: She is alert and oriented to person, place, and time. She has normal reflexes.   Psychiatric: She has a normal mood and affect. Her behavior is normal. Judgment and thought content normal.           Right Knee Exam     Tests   Patella   Patellar Grind: positive    Left Knee Exam     Inspection   Swelling: present  Effusion: present    Tenderness   The patient tender to palpation of the medial joint line and lateral joint line.    Crepitus   The patient has crepitus of the patella.    Range of  Motion   Extension:  normal   Flexion:  abnormal     Tests   Meniscus   Ivory:  Medial - positive   Stability   Lachman: normal (-1 to 2mm)   PCL-Posterior Drawer: normal (0 to 2mm)  Patella   Patellar Tracking: normal  Patellar Grind: positive    Other   Sensation: normal    Muscle Strength   Right Lower Extremity   Quadriceps:  5/5   Hamstrin/5   Left Lower Extremity   Quadriceps:  5/5   Hamstrin/5       IMAGING:  No results found.     ASSESSMENT:      ICD-10-CM ICD-9-CM   1. Primary osteoarthritis of left knee  M17.12 715.16       PLAN:     -Findings and treatment options were discussed with the patient  -All questions answered  Natural history and expected course discussed. Questions answered.  Educational materials distributed.  Reduction in offending activity.  OTC analgesics as needed.  Arthrocentesis. See procedure note.  Left knee Synvisc-One injection today    There are no Patient Instructions on file for this visit.      No orders of the defined types were placed in this encounter.        Large Joint Aspiration/Injection: L supra patellar bursa    Date/Time: 2024 10:00 AM    Performed by: Janeth Rock FNP  Authorized by: Janeth Rock FNP    Consent Done?:  Yes (Verbal)  Indications:  Pain  Site marked: the procedure site was marked    Local anesthetic:  Bupivacaine 0.25% without epinephrine    Details:  Needle Size:  22 G  Location:  Knee  Site:  L supra patellar bursa  Medications:  48 mg hylan g-f 20 48 mg/6 mL  Patient tolerance:  Patient tolerated the procedure well with no immediate complications

## 2024-09-05 NOTE — PROGRESS NOTES
Subjective:       Patient ID: Becki Mejia is a 64 y.o. female     Chief Complaint:    Chief Complaint   Patient presents with    Memory Loss        Allergies:  Codeine, Codeine phosphate, Tetanus and diphtheria toxoids, and Tetanus immune globulin    Current Medications:    Outpatient Encounter Medications as of 9/6/2024   Medication Sig Dispense Refill    allopurinoL (ZYLOPRIM) 100 MG tablet Take 1 tablet (100 mg total) by mouth once daily. 90 tablet 1    amLODIPine (NORVASC) 5 MG tablet Take 1 tablet (5 mg total) by mouth daily as needed (If blood pressure is over 120/80). 90 tablet 1    aspirin (ECOTRIN) 81 MG EC tablet Take 81 mg by mouth once daily.      atorvastatin (LIPITOR) 80 MG tablet Take 1 tablet (80 mg total) by mouth once daily. 90 tablet 1    benazepriL (LOTENSIN) 40 MG tablet Take 1 tablet (40 mg total) by mouth every evening. 90 tablet 1    buPROPion (WELLBUTRIN XL) 300 MG 24 hr tablet Take 1 tablet (300 mg total) by mouth once daily. 30 tablet 11    carvediloL (COREG) 25 MG tablet Take 1 tablet (25 mg total) by mouth 2 (two) times daily. 180 tablet 0    diclofenac (VOLTAREN) 75 MG EC tablet Take 1 tablet (75 mg total) by mouth 2 (two) times daily. 60 tablet 1    diphenhydrAMINE (SOMINEX) 25 mg tablet Take 25 mg by mouth nightly as needed for Insomnia.      docusate sodium (COLACE) 100 MG capsule Take 100 mg by mouth 2 (two) times daily.      FEROSUL 325 mg (65 mg iron) Tab tablet TAKE 1 TABLET ONE TIME DAILY 90 tablet 1    hydrOXYzine HCL (ATARAX) 25 MG tablet TAKE 1 TABLET (25 MG TOTAL) BY MOUTH EVERY EVENING. 90 tablet 1    levothyroxine (SYNTHROID) 50 MCG tablet Take 1 tablet (50 mcg total) by mouth before breakfast. 90 tablet 1    nitroGLYCERIN (NITROSTAT) 0.4 MG SL tablet Place 1 tablet (0.4 mg total) under the tongue every 5 (five) minutes as needed for Chest pain. 30 tablet 0    potassium chloride (MICRO-K) 10 MEQ CpSR Take 1 capsule (10 mEq total) by mouth once daily. 90  capsule 0    spironolactone (ALDACTONE) 25 MG tablet Take 1 tablet (25 mg total) by mouth once daily. 90 tablet 1    tiZANidine (ZANAFLEX) 4 MG tablet Take 4 mg by mouth every 6 (six) hours as needed.      topiramate (TOPAMAX) 50 MG tablet Take 1 tablet (50 mg total) by mouth 2 (two) times daily. 180 tablet 1     No facility-administered encounter medications on file as of 9/6/2024.       History of Present Illness  65 y/o female new referral to neurology for reported memory impairment    Last 2-3 months more issue with forgetting things like leaving things on the stove, such as tea on the stove and she had left going to [employer].  Had hummingbird feed on stove and forgot it as well.  Denies noting repeating questions, denies word searching, driving fine, no recent accidents.  Not having trouble remembering names.  ADLs without difficulty.  She does question if stress might be a factor.    MMSE 30/30 here in clinic    She is on high dosing wellbutrin for anxiety/depression which too the stress can be a factor, so too can the mediation.    She is taking vistaril nightly as well as over the counter sleep aid (another antihistamine) which I did recommend she not take both    She is on topamax 50mg bid for migraine, I did discuss possibly it might have cognitive complications, might want to entertain idea of tapering off.    No recent labs or imaging           Review of Systems  Review of Systems   Constitutional:  Negative for diaphoresis and fever.   HENT:  Negative for congestion, hearing loss and tinnitus.    Eyes:  Negative for blurred vision, double vision, photophobia, discharge and redness.   Respiratory:  Negative for cough and shortness of breath.    Cardiovascular:  Negative for chest pain.   Gastrointestinal:  Negative for abdominal pain, nausea and vomiting.   Musculoskeletal:  Negative for back pain, joint pain, myalgias and neck pain.   Skin:  Negative for itching and rash.   Neurological:  Negative for  dizziness, tremors, sensory change, speech change, focal weakness, seizures, loss of consciousness, weakness and headaches.   Psychiatric/Behavioral:  Positive for memory loss. Negative for depression and hallucinations. The patient does not have insomnia.    All other systems reviewed and are negative.     Objective:     NEUROLOGICAL EXAMINATION:     MENTAL STATUS   Oriented to person, place, and time.   Attention: normal. Concentration: normal.   Speech: speech is normal   Level of consciousness: alert  Knowledge: good and consistent with education.   Normal comprehension.     CRANIAL NERVES     CN II   Visual fields full to confrontation.   Visual acuity: normal  Right visual field deficit: none  Left visual field deficit: none     CN III, IV, VI   Pupils are equal, round, and reactive to light.  Extraocular motions are normal.   Right pupil: Size: 3 mm. Shape: regular. Reactivity: brisk. Consensual response: intact. Accommodation: intact.   Left pupil: Size: 3 mm. Shape: regular. Reactivity: brisk. Consensual response: intact. Accommodation: intact.   CN III: no CN III palsy  CN VI: no CN VI palsy  Nystagmus: none   Diplopia: none  Upgaze: normal  Downgaze: normal  Conjugate gaze: present  Vestibulo-ocular reflex: present    CN V   Facial sensation intact.   Right facial sensation deficit: none  Left facial sensation deficit: none  Right corneal reflex: normal  Left corneal reflex: normal    CN VII   Facial expression full, symmetric.   Right facial weakness: none  Left facial weakness: none  Right taste: normal  Left taste: normal    CN VIII   CN VIII normal.   Hearing: intact    CN IX, X   CN IX normal.   CN X normal.   Palate: symmetric    CN XI   CN XI normal.   Right sternocleidomastoid strength: normal  Left sternocleidomastoid strength: normal  Right trapezius strength: normal  Left trapezius strength: normal    CN XII   CN XII normal.   Tongue: not atrophic  Fasciculations: absent  Tongue deviation:  none    MOTOR EXAM   Muscle bulk: normal  Overall muscle tone: normal  Right arm tone: normal  Left arm tone: normal  Right arm pronator drift: absent  Left arm pronator drift: absent  Right leg tone: normal  Left leg tone: normal    Strength   Right neck flexion: 5/5  Left neck flexion: 55  Right neck extension: /5  Left neck extension:   Right deltoid: /  Left deltoid: /  Right biceps: 5  Left biceps: 5  Right triceps:   Left triceps:   Right wrist flexion:   Left wrist flexion: /  Right wrist extension:   Left wrist extension:   Right interossei:   Left interossei:   Right iliopsoas:   Left iliopsoas:   Right quadriceps:   Left quadriceps:   Right hamstrin/5  Left hamstrin/5  Right anterior tibial:   Left anterior tibial:   Right posterior tibial:   Left posterior tibial:   Right gastroc:   Left gastroc:     REFLEXES     Reflexes   Right brachioradialis: 2+  Left brachioradialis: 2+  Right biceps: 2+  Left biceps: 2+  Right triceps: 2+  Left triceps: 2+  Right patellar: 2+  Left patellar: 2+  Right achilles: 2+  Left achilles: 2+  Right plantar: normal  Left plantar: normal  Right Morales: absent  Left Morales: absent  Right ankle clonus: absent  Left ankle clonus: absent  Right pendular knee jerk: absent  Left pendular knee jerk: absent    SENSORY EXAM   Light touch normal.   Right arm light touch: normal  Left arm light touch: normal  Right leg light touch: normal  Left leg light touch: normal  Vibration normal.   Right arm vibration: normal  Left arm vibration: normal  Right leg vibration: normal  Left leg vibration: normal  Proprioception normal.   Right arm proprioception: normal  Left arm proprioception: normal  Right leg proprioception: normal  Left leg proprioception: normal  Pinprick normal.   Right arm pinprick: normal  Left arm pinprick: normal  Right leg pinprick: normal  Left leg pinprick: normal  Graphesthesia:  normal  Romberg: negative  Stereognosis: normal    GAIT AND COORDINATION     Gait  Gait: normal     Coordination   Finger to nose coordination: normal  Heel to shin coordination: normal  Tandem walking coordination: normal    Tremor   Resting tremor: absent  Intention tremor: absent  Action tremor: absent       Physical Exam  Vitals and nursing note reviewed.   Constitutional:       Appearance: Normal appearance.   HENT:      Head: Normocephalic.   Eyes:      Extraocular Movements: Extraocular movements intact and EOM normal.      Pupils: Pupils are equal, round, and reactive to light.   Cardiovascular:      Rate and Rhythm: Normal rate and regular rhythm.   Pulmonary:      Effort: Pulmonary effort is normal.      Breath sounds: Normal breath sounds.   Musculoskeletal:         General: No swelling or tenderness. Normal range of motion.      Cervical back: Normal range of motion and neck supple.      Right lower leg: No edema.      Left lower leg: No edema.   Skin:     General: Skin is warm and dry.      Coloration: Skin is not jaundiced.      Findings: No rash.   Neurological:      General: No focal deficit present.      Mental Status: She is alert and oriented to person, place, and time.      GCS: GCS eye subscore is 4. GCS verbal subscore is 5. GCS motor subscore is 6.      Cranial Nerves: No cranial nerve deficit.      Sensory: No sensory deficit.      Motor: Motor function is intact. No weakness.      Coordination: Coordination is intact. Coordination normal. Finger-Nose-Finger Test, Heel to Shin Test and Romberg Test normal.      Gait: Gait is intact. Gait and tandem walk normal.      Deep Tendon Reflexes: Reflexes normal.      Reflex Scores:       Tricep reflexes are 2+ on the right side and 2+ on the left side.       Bicep reflexes are 2+ on the right side and 2+ on the left side.       Brachioradialis reflexes are 2+ on the right side and 2+ on the left side.       Patellar reflexes are 2+ on the right side  and 2+ on the left side.       Achilles reflexes are 2+ on the right side and 2+ on the left side.  Psychiatric:         Mood and Affect: Mood normal.         Speech: Speech normal.         Behavior: Behavior normal.          Assessment:     Problem List Items Addressed This Visit          Neuro    Other amnesia - Primary    Relevant Orders    MRI Brain Without Contrast    Migraine without aura and with status migrainosus, not intractable       Psychiatric    Depression    Anxiety       Endocrine    Hypothyroidism    Relevant Orders    TSH     Other Visit Diagnoses       Vitamin B12 deficiency        Relevant Orders    Folate    Vitamin B12    Vitamin D deficiency        Relevant Orders    Vitamin D             Primary Diagnosis and ICD10  Memory loss [R41.3]    Plan:     Patient Instructions   MRI brain  Dementia panel  MMSE 30/30 in clinic  No new medications recommended currently    There are no discontinued medications.    Requested Prescriptions      No prescriptions requested or ordered in this encounter       Orders Placed This Encounter   Procedures    MRI Brain Without Contrast    Ammonia    Comprehensive Metabolic Panel    CBC Auto Differential    Folate    Vitamin D    Vitamin B12    TSH

## 2024-09-06 ENCOUNTER — OFFICE VISIT (OUTPATIENT)
Dept: NEUROLOGY | Facility: CLINIC | Age: 64
End: 2024-09-06
Payer: MEDICARE

## 2024-09-06 ENCOUNTER — TELEPHONE (OUTPATIENT)
Dept: NEUROLOGY | Facility: CLINIC | Age: 64
End: 2024-09-06
Payer: MEDICARE

## 2024-09-06 VITALS
SYSTOLIC BLOOD PRESSURE: 186 MMHG | BODY MASS INDEX: 29.37 KG/M2 | HEIGHT: 64 IN | DIASTOLIC BLOOD PRESSURE: 76 MMHG | OXYGEN SATURATION: 99 % | HEART RATE: 65 BPM | WEIGHT: 172 LBS

## 2024-09-06 DIAGNOSIS — E55.9 VITAMIN D DEFICIENCY: Primary | ICD-10-CM

## 2024-09-06 DIAGNOSIS — E53.8 VITAMIN B12 DEFICIENCY: ICD-10-CM

## 2024-09-06 DIAGNOSIS — E03.9 HYPOTHYROIDISM, UNSPECIFIED TYPE: ICD-10-CM

## 2024-09-06 DIAGNOSIS — R41.3 OTHER AMNESIA: ICD-10-CM

## 2024-09-06 DIAGNOSIS — G43.001 MIGRAINE WITHOUT AURA AND WITH STATUS MIGRAINOSUS, NOT INTRACTABLE: ICD-10-CM

## 2024-09-06 DIAGNOSIS — R41.3 MEMORY LOSS: Primary | ICD-10-CM

## 2024-09-06 DIAGNOSIS — E55.9 VITAMIN D DEFICIENCY: ICD-10-CM

## 2024-09-06 DIAGNOSIS — F32.A DEPRESSION, UNSPECIFIED DEPRESSION TYPE: ICD-10-CM

## 2024-09-06 DIAGNOSIS — F41.9 ANXIETY: ICD-10-CM

## 2024-09-06 PROCEDURE — 3288F FALL RISK ASSESSMENT DOCD: CPT | Mod: CPTII,,, | Performed by: NURSE PRACTITIONER

## 2024-09-06 PROCEDURE — 3044F HG A1C LEVEL LT 7.0%: CPT | Mod: CPTII,,, | Performed by: NURSE PRACTITIONER

## 2024-09-06 PROCEDURE — 99215 OFFICE O/P EST HI 40 MIN: CPT | Mod: PBBFAC | Performed by: NURSE PRACTITIONER

## 2024-09-06 PROCEDURE — 4010F ACE/ARB THERAPY RXD/TAKEN: CPT | Mod: CPTII,,, | Performed by: NURSE PRACTITIONER

## 2024-09-06 PROCEDURE — 1101F PT FALLS ASSESS-DOCD LE1/YR: CPT | Mod: CPTII,,, | Performed by: NURSE PRACTITIONER

## 2024-09-06 PROCEDURE — 1160F RVW MEDS BY RX/DR IN RCRD: CPT | Mod: CPTII,,, | Performed by: NURSE PRACTITIONER

## 2024-09-06 PROCEDURE — 99203 OFFICE O/P NEW LOW 30 MIN: CPT | Mod: S$PBB,,, | Performed by: NURSE PRACTITIONER

## 2024-09-06 PROCEDURE — 1159F MED LIST DOCD IN RCRD: CPT | Mod: CPTII,,, | Performed by: NURSE PRACTITIONER

## 2024-09-06 PROCEDURE — 3077F SYST BP >= 140 MM HG: CPT | Mod: CPTII,,, | Performed by: NURSE PRACTITIONER

## 2024-09-06 PROCEDURE — 3008F BODY MASS INDEX DOCD: CPT | Mod: CPTII,,, | Performed by: NURSE PRACTITIONER

## 2024-09-06 PROCEDURE — 3078F DIAST BP <80 MM HG: CPT | Mod: CPTII,,, | Performed by: NURSE PRACTITIONER

## 2024-09-06 PROCEDURE — 99999 PR PBB SHADOW E&M-EST. PATIENT-LVL V: CPT | Mod: PBBFAC,,, | Performed by: NURSE PRACTITIONER

## 2024-09-06 RX ORDER — ERGOCALCIFEROL 1.25 MG/1
50000 CAPSULE ORAL
Qty: 4 CAPSULE | Refills: 2 | Status: SHIPPED | OUTPATIENT
Start: 2024-09-06

## 2024-09-06 NOTE — TELEPHONE ENCOUNTER
Pt voiced her understanding.    ----- Message from ASHA Samuel sent at 9/6/2024 11:41 AM CDT -----  Vitamin d is low at 20, I sent in supplement for her to take as directed

## 2024-09-16 ENCOUNTER — HOSPITAL ENCOUNTER (OUTPATIENT)
Dept: RADIOLOGY | Facility: HOSPITAL | Age: 64
Discharge: HOME | End: 2024-09-16
Payer: MEDICARE

## 2024-09-16 DIAGNOSIS — Z78.0 ASYMPTOMATIC MENOPAUSAL STATE: ICD-10-CM

## 2024-09-16 PROCEDURE — 77080 DXA BONE DENSITY AXIAL: CPT | Mod: TC

## 2024-09-16 PROCEDURE — 77080 DXA BONE DENSITY AXIAL: CPT | Mod: 26,,, | Performed by: RADIOLOGY

## 2024-09-19 ENCOUNTER — OFFICE VISIT (OUTPATIENT)
Dept: CARDIOLOGY | Facility: CLINIC | Age: 64
End: 2024-09-19
Payer: MEDICARE

## 2024-09-19 ENCOUNTER — PATIENT MESSAGE (OUTPATIENT)
Dept: ADMINISTRATIVE | Facility: HOSPITAL | Age: 64
End: 2024-09-19

## 2024-09-19 VITALS
WEIGHT: 172 LBS | SYSTOLIC BLOOD PRESSURE: 168 MMHG | BODY MASS INDEX: 29.37 KG/M2 | HEIGHT: 64 IN | DIASTOLIC BLOOD PRESSURE: 82 MMHG | OXYGEN SATURATION: 96 % | HEART RATE: 64 BPM

## 2024-09-19 DIAGNOSIS — R06.09 DOE (DYSPNEA ON EXERTION): ICD-10-CM

## 2024-09-19 DIAGNOSIS — I25.10 CORONARY ARTERY DISEASE INVOLVING NATIVE CORONARY ARTERY OF NATIVE HEART WITHOUT ANGINA PECTORIS: ICD-10-CM

## 2024-09-19 DIAGNOSIS — I25.10 CORONARY ARTERY DISEASE, UNSPECIFIED VESSEL OR LESION TYPE, UNSPECIFIED WHETHER ANGINA PRESENT, UNSPECIFIED WHETHER NATIVE OR TRANSPLANTED HEART: Primary | ICD-10-CM

## 2024-09-19 DIAGNOSIS — E78.5 HYPERLIPIDEMIA, UNSPECIFIED HYPERLIPIDEMIA TYPE: ICD-10-CM

## 2024-09-19 DIAGNOSIS — I10 ESSENTIAL HYPERTENSION: ICD-10-CM

## 2024-09-19 PROCEDURE — 93005 ELECTROCARDIOGRAM TRACING: CPT | Mod: PBBFAC | Performed by: INTERNAL MEDICINE

## 2024-09-19 PROCEDURE — 99215 OFFICE O/P EST HI 40 MIN: CPT | Mod: PBBFAC,25 | Performed by: NURSE PRACTITIONER

## 2024-09-19 PROCEDURE — 3077F SYST BP >= 140 MM HG: CPT | Mod: CPTII,,, | Performed by: NURSE PRACTITIONER

## 2024-09-19 PROCEDURE — 99999 PR PBB SHADOW E&M-EST. PATIENT-LVL V: CPT | Mod: PBBFAC,,, | Performed by: NURSE PRACTITIONER

## 2024-09-19 PROCEDURE — 1159F MED LIST DOCD IN RCRD: CPT | Mod: CPTII,,, | Performed by: NURSE PRACTITIONER

## 2024-09-19 PROCEDURE — 3044F HG A1C LEVEL LT 7.0%: CPT | Mod: CPTII,,, | Performed by: NURSE PRACTITIONER

## 2024-09-19 PROCEDURE — 4010F ACE/ARB THERAPY RXD/TAKEN: CPT | Mod: CPTII,,, | Performed by: NURSE PRACTITIONER

## 2024-09-19 PROCEDURE — 1160F RVW MEDS BY RX/DR IN RCRD: CPT | Mod: CPTII,,, | Performed by: NURSE PRACTITIONER

## 2024-09-19 PROCEDURE — 3079F DIAST BP 80-89 MM HG: CPT | Mod: CPTII,,, | Performed by: NURSE PRACTITIONER

## 2024-09-19 PROCEDURE — 99214 OFFICE O/P EST MOD 30 MIN: CPT | Mod: S$PBB,,, | Performed by: NURSE PRACTITIONER

## 2024-09-19 PROCEDURE — 3288F FALL RISK ASSESSMENT DOCD: CPT | Mod: CPTII,,, | Performed by: NURSE PRACTITIONER

## 2024-09-19 PROCEDURE — 1101F PT FALLS ASSESS-DOCD LE1/YR: CPT | Mod: CPTII,,, | Performed by: NURSE PRACTITIONER

## 2024-09-19 PROCEDURE — 93010 ELECTROCARDIOGRAM REPORT: CPT | Mod: S$PBB,,, | Performed by: INTERNAL MEDICINE

## 2024-09-19 PROCEDURE — 3008F BODY MASS INDEX DOCD: CPT | Mod: CPTII,,, | Performed by: NURSE PRACTITIONER

## 2024-09-20 PROBLEM — R06.09 DOE (DYSPNEA ON EXERTION): Status: ACTIVE | Noted: 2024-09-20

## 2024-09-20 PROBLEM — R07.9 CHEST PAIN: Status: RESOLVED | Noted: 2024-02-02 | Resolved: 2024-09-20

## 2024-09-20 LAB
OHS QRS DURATION: 88 MS
OHS QTC CALCULATION: 412 MS

## 2024-09-20 NOTE — ASSESSMENT & PLAN NOTE
168/62  Patient monitors her bp at home with ranges from 118/60s to 180/70s.   She was offered the digital hypertension program by her PCP which she declined. I encouraged her to reconsider and she will call her PCP to arrange.

## 2024-09-20 NOTE — PROGRESS NOTES
PCP: Russell Judd FNP    Referring Provider:     Subjective:   Becki Mejia is a 64 y.o. female with hx of CAD s/p CABG 2017 for LM disease, HTN and HLD,  who presents for routine follow up. She states that she has done well. She has chronic, stable CARNES. She reports an episode in 2024 for which she was evaluated by her PCP. She has had none since single episode in 2024 and no sublingual ntg use    2023 presents for routine follow up. She states that she is doing well. She does have chronic, stable CARNES. Her only other issues is a cramp like pain to her BLE (calves) with ambulation of <1/4 mild c/w claudication. She is concerned that this is r/t her statin therapy. She has been on this dose for years.     3/13/2023 presents for routine follow up. She does not exercise but remains physically active and asymptomtic.         Fhx:  Family History   Problem Relation Name Age of Onset    Diabetes Mother      Hypertension Mother      Uterine cancer Mother      Parkinsonism Father      Diabetes Father      Hypertension Father      Colon cancer Sister      Hypoglycemic Sister      Liver cancer Sister      Diabetes Sister      No Known Problems Daughter      Diabetes Paternal Grandmother      No Known Problems Brother      Hepatitis Son          C    Post-traumatic stress disorder Son      Hypertension Son      Breast cancer Paternal Great-Grandmother       Shx:   Social History     Socioeconomic History    Marital status:      Spouse name: Harper    Number of children: 3   Occupational History     Comment: DISABILITY   Tobacco Use    Smoking status: Former     Current packs/day: 0.00     Average packs/day: 1 pack/day for 19.0 years (19.0 ttl pk-yrs)     Types: Cigarettes     Start date:      Quit date:      Years since quittin.7     Passive exposure: Past    Smokeless tobacco: Never    Tobacco comments:     smoked off and on   Substance and Sexual Activity    Alcohol use: Never    Drug  use: Never    Sexual activity: Yes     Partners: Male     Birth control/protection: See Surgical Hx   Social History Narrative    Lives in home with   and 2 grandchildren     Social Determinants of Health     Financial Resource Strain: Low Risk  (8/30/2024)    Overall Financial Resource Strain (CARDIA)     Difficulty of Paying Living Expenses: Not hard at all   Food Insecurity: No Food Insecurity (8/30/2024)    Hunger Vital Sign     Worried About Running Out of Food in the Last Year: Never true     Ran Out of Food in the Last Year: Never true   Transportation Needs: No Transportation Needs (8/30/2024)    PRAPARE - Transportation     Lack of Transportation (Medical): No     Lack of Transportation (Non-Medical): No   Physical Activity: Inactive (8/30/2024)    Exercise Vital Sign     Days of Exercise per Week: 0 days     Minutes of Exercise per Session: 0 min   Stress: Stress Concern Present (8/30/2024)    Citizen of [place] [employer] - Occupational Stress Questionnaire     Feeling of Stress : To some extent   Housing Stability: Low Risk  (8/30/2024)    Housing Stability Vital Sign     Unable to Pay for Housing in the Last Year: No     Homeless in the Last Year: No       EKG   9/19/2024 RSR with HR 67 bpm; minimal voltage criteria for LVH; when compared with EKG 3/19/2024 no significant change was found  3/19/2024 RSR with HR 64 bpm; normal EKG; when compared with EKG 9/19/2023 no significant change was found.  9/19/2023 RSR with HR 65 bpm; normal EKG  3/13/2023 RSR with HR 67 bpm; minimal voltage criteria for LVH  8/9/2022 RSR with HR 65 bpm; minim al voltage criteria for LVH    ECHO 11/4/2019  Mild concentric Lvh with normal VL size and systolic funciton, EF 45%  Trace MR with normal LA Size  Aortic valve sclerosis without stenosis. Normal aortic root size  Mild TR with o/w normal right heart chambers and valves. RVSP 265 mmHG + RAP  Since the study of 9/27/2019 there is no significant  change.    [facility] 10/29/2019  Severe single vessel CAD involving the prox LCX  Patent svg to the OM  Atrophic LIMA to the diagonal branch of the LAD  Normal LV size and systolic function, EF 55%  No significant MR  LVDD., LVEDP 18 mmHg        Lab Results   Component Value Date     (L) 09/06/2024    K 4.1 09/06/2024    CL 99 09/06/2024    CO2 28 09/06/2024    BUN 9 09/06/2024    CREATININE 0.85 09/06/2024    CALCIUM 8.7 09/06/2024    ANIONGAP 7 09/06/2024    EGFRNONAA 64 04/18/2022       Lab Results   Component Value Date    CHOL 160 01/18/2024    CHOL 144 07/17/2023    CHOL 153 01/17/2023     Lab Results   Component Value Date    HDL 62 (H) 01/18/2024    HDL 59 07/17/2023    HDL 56 01/17/2023     Lab Results   Component Value Date    LDLCALC 86 01/18/2024    LDLCALC 73 07/17/2023    LDLCALC 80 01/17/2023     Lab Results   Component Value Date    TRIG 61 01/18/2024    TRIG 59 07/17/2023    TRIG 87 01/17/2023     Lab Results   Component Value Date    CHOLHDL 2.6 01/18/2024    CHOLHDL 2.4 07/17/2023    CHOLHDL 2.7 01/17/2023       Lab Results   Component Value Date    WBC 5.67 09/06/2024    HGB 11.8 (L) 09/06/2024    HCT 36.0 (L) 09/06/2024    MCV 89.3 09/06/2024     09/06/2024           Current Outpatient Medications:     allopurinoL (ZYLOPRIM) 100 MG tablet, Take 1 tablet (100 mg total) by mouth once daily., Disp: 90 tablet, Rfl: 1    amLODIPine (NORVASC) 5 MG tablet, Take 1 tablet (5 mg total) by mouth daily as needed (If blood pressure is over 120/80)., Disp: 90 tablet, Rfl: 1    aspirin (ECOTRIN) 81 MG EC tablet, Take 81 mg by mouth once daily., Disp: , Rfl:     atorvastatin (LIPITOR) 80 MG tablet, Take 1 tablet (80 mg total) by mouth once daily., Disp: 90 tablet, Rfl: 1    benazepriL (LOTENSIN) 40 MG tablet, Take 1 tablet (40 mg total) by mouth every evening., Disp: 90 tablet, Rfl: 1    buPROPion (WELLBUTRIN XL) 300 MG 24 hr tablet, Take 1 tablet (300 mg total) by mouth once daily., Disp: 30 tablet,  Rfl: 11    carvediloL (COREG) 25 MG tablet, Take 1 tablet (25 mg total) by mouth 2 (two) times daily., Disp: 180 tablet, Rfl: 0    diphenhydrAMINE (SOMINEX) 25 mg tablet, Take 25 mg by mouth nightly as needed for Insomnia., Disp: , Rfl:     ergocalciferol (ERGOCALCIFEROL) 50,000 unit Cap, Take 1 capsule (50,000 Units total) by mouth every 7 days., Disp: 4 capsule, Rfl: 2    FEROSUL 325 mg (65 mg iron) Tab tablet, TAKE 1 TABLET ONE TIME DAILY, Disp: 90 tablet, Rfl: 1    levothyroxine (SYNTHROID) 50 MCG tablet, Take 1 tablet (50 mcg total) by mouth before breakfast., Disp: 90 tablet, Rfl: 1    nitroGLYCERIN (NITROSTAT) 0.4 MG SL tablet, Place 1 tablet (0.4 mg total) under the tongue every 5 (five) minutes as needed for Chest pain., Disp: 30 tablet, Rfl: 0    potassium chloride (MICRO-K) 10 MEQ CpSR, Take 1 capsule (10 mEq total) by mouth once daily., Disp: 90 capsule, Rfl: 0    spironolactone (ALDACTONE) 25 MG tablet, Take 1 tablet (25 mg total) by mouth once daily., Disp: 90 tablet, Rfl: 1    topiramate (TOPAMAX) 50 MG tablet, Take 1 tablet (50 mg total) by mouth 2 (two) times daily. (Patient taking differently: Take 50 mg by mouth once daily.), Disp: 180 tablet, Rfl: 1    diclofenac (VOLTAREN) 75 MG EC tablet, Take 1 tablet (75 mg total) by mouth 2 (two) times daily., Disp: 60 tablet, Rfl: 1    docusate sodium (COLACE) 100 MG capsule, Take 100 mg by mouth 2 (two) times daily. (Patient not taking: Reported on 9/19/2024), Disp: , Rfl:     hydrOXYzine HCL (ATARAX) 25 MG tablet, TAKE 1 TABLET (25 MG TOTAL) BY MOUTH EVERY EVENING. (Patient not taking: Reported on 9/19/2024), Disp: 90 tablet, Rfl: 1    tiZANidine (ZANAFLEX) 4 MG tablet, Take 4 mg by mouth every 6 (six) hours as needed. (Patient not taking: Reported on 9/19/2024), Disp: , Rfl:   Meds reviewed and reconciled.      Review of Systems   Respiratory:  Positive for shortness of breath.         Chronic stable CARNES   Cardiovascular:  Negative for chest pain,  "palpitations, orthopnea, claudication, leg swelling and PND.   Neurological:  Negative for dizziness, loss of consciousness and weakness.           Objective:   BP (!) 168/82 (BP Location: Left arm, Patient Position: Sitting)   Pulse 64   Ht 5' 4" (1.626 m)   Wt 78 kg (172 lb)   LMP  (LMP Unknown)   SpO2 96%   BMI 29.52 kg/m²     Physical Exam  Vitals and nursing note reviewed.   Constitutional:       Appearance: Normal appearance. She is normal weight.   HENT:      Head: Normocephalic and atraumatic.   Neck:      Vascular: No carotid bruit.   Cardiovascular:      Rate and Rhythm: Normal rate and regular rhythm.      Pulses: Normal pulses.      Heart sounds: Normal heart sounds.   Pulmonary:      Effort: Pulmonary effort is normal.      Breath sounds: Normal breath sounds.   Abdominal:      Palpations: Abdomen is soft.   Musculoskeletal:      Cervical back: Neck supple.      Right lower leg: No edema.      Left lower leg: No edema.   Skin:     General: Skin is warm and dry.      Capillary Refill: Capillary refill takes less than 2 seconds.   Neurological:      General: No focal deficit present.      Mental Status: She is alert.           Assessment:     1. Coronary artery disease, unspecified vessel or lesion type, unspecified whether angina present, unspecified whether native or transplanted heart  EKG 12-lead    EKG 12-lead      2. Hyperlipidemia, unspecified hyperlipidemia type        3. Essential hypertension        4. Coronary artery disease involving native coronary artery of native heart without angina pectoris        5. CARNES (dyspnea on exertion)              Plan:   Hyperlipidemia  Lab Results   Component Value Date    CHOL 160 01/18/2024    CHOL 144 07/17/2023    CHOL 153 01/17/2023     Lab Results   Component Value Date    HDL 62 (H) 01/18/2024    HDL 59 07/17/2023    HDL 56 01/17/2023     Lab Results   Component Value Date    LDLCALC 86 01/18/2024    LDLCALC 73 07/17/2023    LDLCALC 80 01/17/2023 "     Lab Results   Component Value Date    TRIG 61 01/18/2024    TRIG 59 07/17/2023    TRIG 87 01/17/2023       Lab Results   Component Value Date    CHOLHDL 2.6 01/18/2024    CHOLHDL 2.4 07/17/2023    CHOLHDL 2.7 01/17/2023     Lipitor 80 mg po daily  Lipids followed by PCP    Essential hypertension  168/62  Patient monitors her bp at home with ranges from 118/60s to 180/70s.   She was offered the digital hypertension program by her PCP which she declined. I encouraged her to reconsider and she will call her PCP to arrange.     Coronary artery disease involving native coronary artery of native heart  S/p CABG 8/31/2017 for LM disease    No chest pain  Continue ASA/Lipitor        CARNES (dyspnea on exertion)  Chronic, stable CARNES    RTC: 6 months

## 2024-09-24 ENCOUNTER — TELEPHONE (OUTPATIENT)
Dept: NEUROLOGY | Facility: CLINIC | Age: 64
End: 2024-09-24
Payer: MEDICARE

## 2024-09-24 ENCOUNTER — HOSPITAL ENCOUNTER (OUTPATIENT)
Dept: RADIOLOGY | Facility: HOSPITAL | Age: 64
Discharge: HOME | End: 2024-09-24
Attending: NURSE PRACTITIONER
Payer: MEDICARE

## 2024-09-24 DIAGNOSIS — R41.3 OTHER AMNESIA: ICD-10-CM

## 2024-09-24 PROCEDURE — 70551 MRI BRAIN STEM W/O DYE: CPT | Mod: 26,,, | Performed by: RADIOLOGY

## 2024-09-24 PROCEDURE — 70551 MRI BRAIN STEM W/O DYE: CPT | Mod: TC

## 2024-09-24 NOTE — TELEPHONE ENCOUNTER
Pt voiced her understanding.    ----- Message from ASHA Samuel sent at 9/24/2024  1:02 PM CDT -----  MRI no strokes, does have moderate small vessel disease which can contribute with memory impairment.  Suggest keep follow-up, will discuss in more detail in clinic and future plan of care

## 2024-09-27 DIAGNOSIS — I10 ESSENTIAL HYPERTENSION: ICD-10-CM

## 2024-09-30 RX ORDER — POTASSIUM CHLORIDE 750 MG/1
10 CAPSULE, EXTENDED RELEASE ORAL DAILY
Qty: 90 CAPSULE | Refills: 0 | Status: SHIPPED | OUTPATIENT
Start: 2024-09-30

## 2024-10-04 ENCOUNTER — PATIENT MESSAGE (OUTPATIENT)
Dept: OTHER | Facility: OTHER | Age: 64
End: 2024-10-04

## 2024-11-07 ENCOUNTER — OFFICE VISIT (OUTPATIENT)
Dept: NEUROLOGY | Facility: CLINIC | Age: 64
End: 2024-11-07
Payer: MEDICARE

## 2024-11-07 VITALS
DIASTOLIC BLOOD PRESSURE: 63 MMHG | HEIGHT: 64 IN | HEART RATE: 70 BPM | WEIGHT: 172.19 LBS | BODY MASS INDEX: 29.4 KG/M2 | OXYGEN SATURATION: 95 % | RESPIRATION RATE: 18 BRPM | SYSTOLIC BLOOD PRESSURE: 138 MMHG

## 2024-11-07 DIAGNOSIS — F32.A DEPRESSION, UNSPECIFIED DEPRESSION TYPE: ICD-10-CM

## 2024-11-07 DIAGNOSIS — G43.001 MIGRAINE WITHOUT AURA AND WITH STATUS MIGRAINOSUS, NOT INTRACTABLE: Primary | ICD-10-CM

## 2024-11-07 DIAGNOSIS — F41.9 ANXIETY: ICD-10-CM

## 2024-11-07 DIAGNOSIS — G31.84 MILD COGNITIVE IMPAIRMENT: ICD-10-CM

## 2024-11-07 PROCEDURE — 3075F SYST BP GE 130 - 139MM HG: CPT | Mod: CPTII,,, | Performed by: NURSE PRACTITIONER

## 2024-11-07 PROCEDURE — 4010F ACE/ARB THERAPY RXD/TAKEN: CPT | Mod: CPTII,,, | Performed by: NURSE PRACTITIONER

## 2024-11-07 PROCEDURE — 99999 PR PBB SHADOW E&M-EST. PATIENT-LVL V: CPT | Mod: PBBFAC,,, | Performed by: NURSE PRACTITIONER

## 2024-11-07 PROCEDURE — 3078F DIAST BP <80 MM HG: CPT | Mod: CPTII,,, | Performed by: NURSE PRACTITIONER

## 2024-11-07 PROCEDURE — 1160F RVW MEDS BY RX/DR IN RCRD: CPT | Mod: CPTII,,, | Performed by: NURSE PRACTITIONER

## 2024-11-07 PROCEDURE — 3044F HG A1C LEVEL LT 7.0%: CPT | Mod: CPTII,,, | Performed by: NURSE PRACTITIONER

## 2024-11-07 PROCEDURE — 3008F BODY MASS INDEX DOCD: CPT | Mod: CPTII,,, | Performed by: NURSE PRACTITIONER

## 2024-11-07 PROCEDURE — 1159F MED LIST DOCD IN RCRD: CPT | Mod: CPTII,,, | Performed by: NURSE PRACTITIONER

## 2024-11-07 PROCEDURE — 99213 OFFICE O/P EST LOW 20 MIN: CPT | Mod: S$PBB,,, | Performed by: NURSE PRACTITIONER

## 2024-11-07 PROCEDURE — 99215 OFFICE O/P EST HI 40 MIN: CPT | Mod: PBBFAC | Performed by: NURSE PRACTITIONER

## 2024-11-07 RX ORDER — NAPROXEN 500 MG/1
TABLET ORAL
Qty: 20 TABLET | Refills: 1 | Status: SHIPPED | OUTPATIENT
Start: 2024-11-07

## 2024-11-07 NOTE — PROGRESS NOTES
Subjective:       Patient ID: Becki Mejia is a 64 y.o. female     Chief Complaint:    No chief complaint on file.       Allergies:  Codeine, Codeine phosphate, Tetanus and diphtheria toxoids, and Tetanus immune globulin    Current Medications:    Outpatient Encounter Medications as of 11/7/2024   Medication Sig Dispense Refill    allopurinoL (ZYLOPRIM) 100 MG tablet Take 1 tablet (100 mg total) by mouth once daily. 90 tablet 1    amLODIPine (NORVASC) 5 MG tablet Take 1 tablet (5 mg total) by mouth daily as needed (If blood pressure is over 120/80). 90 tablet 1    aspirin (ECOTRIN) 81 MG EC tablet Take 81 mg by mouth once daily.      atorvastatin (LIPITOR) 80 MG tablet Take 1 tablet (80 mg total) by mouth once daily. 90 tablet 1    benazepriL (LOTENSIN) 40 MG tablet Take 1 tablet (40 mg total) by mouth every evening. 90 tablet 1    buPROPion (WELLBUTRIN XL) 300 MG 24 hr tablet Take 1 tablet (300 mg total) by mouth once daily. 30 tablet 11    carvediloL (COREG) 25 MG tablet Take 1 tablet (25 mg total) by mouth 2 (two) times daily. 180 tablet 0    diclofenac (VOLTAREN) 75 MG EC tablet Take 1 tablet (75 mg total) by mouth 2 (two) times daily. 60 tablet 1    diphenhydrAMINE (SOMINEX) 25 mg tablet Take 25 mg by mouth nightly as needed for Insomnia.      docusate sodium (COLACE) 100 MG capsule Take 100 mg by mouth 2 (two) times daily.      ergocalciferol (ERGOCALCIFEROL) 50,000 unit Cap Take 1 capsule (50,000 Units total) by mouth every 7 days. 4 capsule 2    FEROSUL 325 mg (65 mg iron) Tab tablet TAKE 1 TABLET ONE TIME DAILY 90 tablet 1    hydrOXYzine HCL (ATARAX) 25 MG tablet TAKE 1 TABLET (25 MG TOTAL) BY MOUTH EVERY EVENING. 90 tablet 1    levothyroxine (SYNTHROID) 50 MCG tablet Take 1 tablet (50 mcg total) by mouth before breakfast. 90 tablet 1    nitroGLYCERIN (NITROSTAT) 0.4 MG SL tablet Place 1 tablet (0.4 mg total) under the tongue every 5 (five) minutes as needed for Chest pain. 30 tablet 0     potassium chloride (MICRO-K) 10 MEQ CpSR Take 1 capsule (10 mEq total) by mouth once daily. 90 capsule 0    spironolactone (ALDACTONE) 25 MG tablet Take 1 tablet (25 mg total) by mouth once daily. 90 tablet 1    tiZANidine (ZANAFLEX) 4 MG tablet Take 4 mg by mouth every 6 (six) hours as needed.      [DISCONTINUED] topiramate (TOPAMAX) 50 MG tablet Take 1 tablet (50 mg total) by mouth 2 (two) times daily. (Patient taking differently: Take 50 mg by mouth once daily.) 180 tablet 1    naproxen (NAPROSYN) 500 MG tablet Take 1 tablet twice daily as needed for headache, not more than 3 days of the week 20 tablet 1     No facility-administered encounter medications on file as of 11/7/2024.       History of Present Illness  65 y/o female following in neurology for reported memory impairment    Onset of symptoms early 2024, more issue with forgetting things like leaving things on the stove, such as tea on the stove and she had left going to [employer].  Had hummingbird feed on stove and forgot it as well.  Denies noting repeating questions, denies word searching, driving fine, no recent accidents.  Not having trouble remembering names.  ADLs without difficulty.  She does question if stress might be a factor.    MMSE 30/30 here in clinic at initial visit, so actually quite good    She is on high dosing wellbutrin for anxiety/depression which too the stress can be a factor, so too can the mediation.    She was taking vistaril nightly as well as over the counter sleep aid (another antihistamine) which I did recommend she not take both    She was on topamax 50mg bid for migraine, I did discuss possibly it might have cognitive complications which it is well known for.  Currently taking once daily.  Denies worsening headaches.  I suggest she hold it, I will give her naproxen to us PRN    I obtained MRI brain 9/24/24, no acute findings, though there was moderate small vessel disease.  Dementia panel labs not revealing.           Review  of Systems  Review of Systems   Constitutional:  Negative for diaphoresis and fever.   HENT:  Negative for congestion, hearing loss and tinnitus.    Eyes:  Negative for blurred vision, double vision, photophobia, discharge and redness.   Respiratory:  Negative for cough and shortness of breath.    Cardiovascular:  Negative for chest pain.   Gastrointestinal:  Negative for abdominal pain, nausea and vomiting.   Musculoskeletal:  Negative for back pain, joint pain, myalgias and neck pain.   Skin:  Negative for itching and rash.   Neurological:  Negative for dizziness, tremors, sensory change, speech change, focal weakness, seizures, loss of consciousness, weakness and headaches.   Psychiatric/Behavioral:  Positive for memory loss. Negative for depression and hallucinations. The patient does not have insomnia.    All other systems reviewed and are negative.     Objective:     NEUROLOGICAL EXAMINATION:     MENTAL STATUS   Oriented to person, place, and time.   Attention: normal. Concentration: normal.   Speech: speech is normal   Level of consciousness: alert  Knowledge: good and consistent with education.   Normal comprehension.     CRANIAL NERVES     CN II   Visual fields full to confrontation.   Visual acuity: normal  Right visual field deficit: none  Left visual field deficit: none     CN III, IV, VI   Pupils are equal, round, and reactive to light.  Extraocular motions are normal.   Right pupil: Size: 3 mm. Shape: regular. Reactivity: brisk. Consensual response: intact. Accommodation: intact.   Left pupil: Size: 3 mm. Shape: regular. Reactivity: brisk. Consensual response: intact. Accommodation: intact.   CN III: no CN III palsy  CN VI: no CN VI palsy  Nystagmus: none   Diplopia: none  Upgaze: normal  Downgaze: normal  Conjugate gaze: present  Vestibulo-ocular reflex: present    CN V   Facial sensation intact.   Right facial sensation deficit: none  Left facial sensation deficit: none  Right corneal reflex:  normal  Left corneal reflex: normal    CN VII   Facial expression full, symmetric.   Right facial weakness: none  Left facial weakness: none  Right taste: normal  Left taste: normal    CN VIII   CN VIII normal.   Hearing: intact    CN IX, X   CN IX normal.   CN X normal.   Palate: symmetric    CN XI   CN XI normal.   Right sternocleidomastoid strength: normal  Left sternocleidomastoid strength: normal  Right trapezius strength: normal  Left trapezius strength: normal    CN XII   CN XII normal.   Tongue: not atrophic  Fasciculations: absent  Tongue deviation: none    MOTOR EXAM   Muscle bulk: normal  Overall muscle tone: normal  Right arm tone: normal  Left arm tone: normal  Right arm pronator drift: absent  Left arm pronator drift: absent  Right leg tone: normal  Left leg tone: normal    Strength   Right neck flexion: 5/5  Left neck flexion: 5/5  Right neck extension: 5/5  Left neck extension: 5/5  Right deltoid: 5/5  Left deltoid: 5/5  Right biceps: 5/5  Left biceps: 5/5  Right triceps: 5/5  Left triceps: 5/5  Right wrist flexion: 5/5  Left wrist flexion: 5/5  Right wrist extension: 5/5  Left wrist extension: 5/5  Right interossei: 5/5  Left interossei: 5/5  Right iliopsoas: 5/5  Left iliopsoas: 5/5  Right quadriceps: 5/5  Left quadriceps: 5/5  Right hamstrin/5  Left hamstrin/5  Right anterior tibial: 5/5  Left anterior tibial: 5/5  Right posterior tibial: 5/5  Left posterior tibial: 5/5  Right gastroc: 5/5  Left gastroc: 5/5    REFLEXES     Reflexes   Right brachioradialis: 2+  Left brachioradialis: 2+  Right biceps: 2+  Left biceps: 2+  Right triceps: 2+  Left triceps: 2+  Right patellar: 2+  Left patellar: 2+  Right achilles: 2+  Left achilles: 2+  Right plantar: normal  Left plantar: normal  Right Morales: absent  Left Morales: absent  Right ankle clonus: absent  Left ankle clonus: absent  Right pendular knee jerk: absent  Left pendular knee jerk: absent    SENSORY EXAM   Light touch normal.   Right arm  light touch: normal  Left arm light touch: normal  Right leg light touch: normal  Left leg light touch: normal  Vibration normal.   Right arm vibration: normal  Left arm vibration: normal  Right leg vibration: normal  Left leg vibration: normal  Proprioception normal.   Right arm proprioception: normal  Left arm proprioception: normal  Right leg proprioception: normal  Left leg proprioception: normal  Pinprick normal.   Right arm pinprick: normal  Left arm pinprick: normal  Right leg pinprick: normal  Left leg pinprick: normal  Graphesthesia: normal  Romberg: negative  Stereognosis: normal    GAIT AND COORDINATION     Gait  Gait: normal     Coordination   Finger to nose coordination: normal  Heel to shin coordination: normal  Tandem walking coordination: normal    Tremor   Resting tremor: absent  Intention tremor: absent  Action tremor: absent       Physical Exam  Vitals and nursing note reviewed.   Constitutional:       Appearance: Normal appearance.   HENT:      Head: Normocephalic.   Eyes:      Extraocular Movements: Extraocular movements intact and EOM normal.      Pupils: Pupils are equal, round, and reactive to light.   Cardiovascular:      Rate and Rhythm: Normal rate and regular rhythm.   Pulmonary:      Effort: Pulmonary effort is normal.      Breath sounds: Normal breath sounds.   Musculoskeletal:         General: No swelling or tenderness. Normal range of motion.      Cervical back: Normal range of motion and neck supple.      Right lower leg: No edema.      Left lower leg: No edema.   Skin:     General: Skin is warm and dry.      Coloration: Skin is not jaundiced.      Findings: No rash.   Neurological:      General: No focal deficit present.      Mental Status: She is alert and oriented to person, place, and time.      GCS: GCS eye subscore is 4. GCS verbal subscore is 5. GCS motor subscore is 6.      Cranial Nerves: No cranial nerve deficit.      Sensory: No sensory deficit.      Motor: Motor function  is intact. No weakness.      Coordination: Coordination is intact. Coordination normal. Finger-Nose-Finger Test, Heel to Shin Test and Romberg Test normal.      Gait: Gait is intact. Gait and tandem walk normal.      Deep Tendon Reflexes: Reflexes normal.      Reflex Scores:       Tricep reflexes are 2+ on the right side and 2+ on the left side.       Bicep reflexes are 2+ on the right side and 2+ on the left side.       Brachioradialis reflexes are 2+ on the right side and 2+ on the left side.       Patellar reflexes are 2+ on the right side and 2+ on the left side.       Achilles reflexes are 2+ on the right side and 2+ on the left side.  Psychiatric:         Mood and Affect: Mood normal.         Speech: Speech normal.         Behavior: Behavior normal.          Assessment:     Problem List Items Addressed This Visit          Neuro    Migraine without aura and with status migrainosus, not intractable - Primary    Relevant Medications    naproxen (NAPROSYN) 500 MG tablet    Mild cognitive impairment       Psychiatric    Depression    Anxiety          Primary Diagnosis and ICD10  Migraine without aura and with status migrainosus, not intractable [G43.001]    Plan:     Patient Instructions   MRI brain reviewed  Dementia panel labs reviewed  MMSE 30/30 in clinic  Stop topamax  Naproxen as directed as needed for migraines    Medications Discontinued During This Encounter   Medication Reason    topiramate (TOPAMAX) 50 MG tablet        Requested Prescriptions     Signed Prescriptions Disp Refills    naproxen (NAPROSYN) 500 MG tablet 20 tablet 1     Sig: Take 1 tablet twice daily as needed for headache, not more than 3 days of the week       No orders of the defined types were placed in this encounter.

## 2024-11-07 NOTE — PATIENT INSTRUCTIONS
MRI brain reviewed  Dementia panel labs reviewed  MMSE 30/30 in clinic  Stop topamax  Naproxen as directed as needed for migraines

## 2025-01-06 ENCOUNTER — OFFICE VISIT (OUTPATIENT)
Dept: FAMILY MEDICINE | Facility: CLINIC | Age: 65
End: 2025-01-06
Payer: MEDICARE

## 2025-01-06 VITALS
SYSTOLIC BLOOD PRESSURE: 172 MMHG | RESPIRATION RATE: 24 BRPM | HEIGHT: 64 IN | OXYGEN SATURATION: 97 % | TEMPERATURE: 98 F | HEART RATE: 70 BPM | WEIGHT: 177.81 LBS | BODY MASS INDEX: 30.35 KG/M2 | DIASTOLIC BLOOD PRESSURE: 74 MMHG

## 2025-01-06 DIAGNOSIS — R05.9 COUGH, UNSPECIFIED TYPE: ICD-10-CM

## 2025-01-06 DIAGNOSIS — J02.9 SORE THROAT: ICD-10-CM

## 2025-01-06 DIAGNOSIS — I10 ESSENTIAL HYPERTENSION: ICD-10-CM

## 2025-01-06 DIAGNOSIS — R09.81 NASAL CONGESTION: ICD-10-CM

## 2025-01-06 DIAGNOSIS — J01.00 ACUTE MAXILLARY SINUSITIS, RECURRENCE NOT SPECIFIED: Primary | ICD-10-CM

## 2025-01-06 LAB
CTP QC/QA: YES
MOLECULAR STREP A: NEGATIVE
POC MOLECULAR INFLUENZA A AGN: NEGATIVE
POC MOLECULAR INFLUENZA B AGN: NEGATIVE
SARS-COV-2 RDRP RESP QL NAA+PROBE: NEGATIVE

## 2025-01-06 PROCEDURE — 1125F AMNT PAIN NOTED PAIN PRSNT: CPT | Mod: ,,,

## 2025-01-06 PROCEDURE — 87502 INFLUENZA DNA AMP PROBE: CPT | Mod: RHCUB

## 2025-01-06 PROCEDURE — 3008F BODY MASS INDEX DOCD: CPT | Mod: ,,,

## 2025-01-06 PROCEDURE — 87651 STREP A DNA AMP PROBE: CPT | Mod: RHCUB

## 2025-01-06 PROCEDURE — 87635 SARS-COV-2 COVID-19 AMP PRB: CPT | Mod: RHCUB

## 2025-01-06 PROCEDURE — 96372 THER/PROPH/DIAG INJ SC/IM: CPT | Mod: ,,,

## 2025-01-06 PROCEDURE — 1160F RVW MEDS BY RX/DR IN RCRD: CPT | Mod: ,,,

## 2025-01-06 PROCEDURE — 3077F SYST BP >= 140 MM HG: CPT | Mod: ,,,

## 2025-01-06 PROCEDURE — 3078F DIAST BP <80 MM HG: CPT | Mod: ,,,

## 2025-01-06 PROCEDURE — 1159F MED LIST DOCD IN RCRD: CPT | Mod: ,,,

## 2025-01-06 PROCEDURE — 99214 OFFICE O/P EST MOD 30 MIN: CPT | Mod: 25,,,

## 2025-01-06 RX ORDER — AZITHROMYCIN 250 MG/1
TABLET, FILM COATED ORAL
Qty: 6 TABLET | Refills: 0 | Status: SHIPPED | OUTPATIENT
Start: 2025-01-06 | End: 2025-01-11

## 2025-01-06 RX ORDER — CEFTRIAXONE 1 G/1
1 INJECTION, POWDER, FOR SOLUTION INTRAMUSCULAR; INTRAVENOUS
Status: COMPLETED | OUTPATIENT
Start: 2025-01-06 | End: 2025-01-06

## 2025-01-06 RX ORDER — BENZONATATE 200 MG/1
200 CAPSULE ORAL 3 TIMES DAILY PRN
Qty: 20 CAPSULE | Refills: 0 | Status: SHIPPED | OUTPATIENT
Start: 2025-01-06 | End: 2025-01-16

## 2025-01-06 RX ADMIN — CEFTRIAXONE 1 G: 1 INJECTION, POWDER, FOR SOLUTION INTRAMUSCULAR; INTRAVENOUS at 11:01

## 2025-01-06 NOTE — Clinical Note
Patient has had flu & RSV vaccine at [facility] in [place], please obtain records.

## 2025-01-07 ENCOUNTER — PATIENT MESSAGE (OUTPATIENT)
Dept: FAMILY MEDICINE | Facility: CLINIC | Age: 65
End: 2025-01-07
Payer: MEDICARE

## 2025-01-07 ENCOUNTER — PATIENT OUTREACH (OUTPATIENT)
Facility: HOSPITAL | Age: 65
End: 2025-01-07
Payer: MEDICARE

## 2025-01-07 DIAGNOSIS — M10.9 GOUT, UNSPECIFIED CAUSE, UNSPECIFIED CHRONICITY, UNSPECIFIED SITE: ICD-10-CM

## 2025-01-07 DIAGNOSIS — E78.5 HYPERLIPIDEMIA, UNSPECIFIED HYPERLIPIDEMIA TYPE: ICD-10-CM

## 2025-01-07 DIAGNOSIS — E03.9 HYPOTHYROIDISM, UNSPECIFIED TYPE: ICD-10-CM

## 2025-01-07 DIAGNOSIS — G89.29 CHRONIC PAIN OF LEFT KNEE: ICD-10-CM

## 2025-01-07 DIAGNOSIS — M25.562 CHRONIC PAIN OF LEFT KNEE: ICD-10-CM

## 2025-01-07 DIAGNOSIS — F32.A DEPRESSION, UNSPECIFIED DEPRESSION TYPE: ICD-10-CM

## 2025-01-07 DIAGNOSIS — E55.9 VITAMIN D DEFICIENCY: ICD-10-CM

## 2025-01-07 DIAGNOSIS — I10 ESSENTIAL HYPERTENSION: ICD-10-CM

## 2025-01-07 NOTE — PROGRESS NOTES
Population Health Chart Review & Patient Outreach Details    Updates Requested / Reviewed:  [x]  MIIX Reviewed    Health Maintenance Topics Addressed and Outreach Outcomes / Actions Taken:  Immunizations [x] HM updated with current Flu and RSV Vaccine

## 2025-01-08 PROBLEM — J01.00 ACUTE MAXILLARY SINUSITIS: Status: ACTIVE | Noted: 2025-01-08

## 2025-01-08 RX ORDER — AMLODIPINE BESYLATE 5 MG/1
5 TABLET ORAL DAILY PRN
Qty: 90 TABLET | Refills: 1 | Status: SHIPPED | OUTPATIENT
Start: 2025-01-08

## 2025-01-08 RX ORDER — DICLOFENAC SODIUM 75 MG/1
75 TABLET, DELAYED RELEASE ORAL 2 TIMES DAILY
Qty: 180 TABLET | Refills: 1 | Status: SHIPPED | OUTPATIENT
Start: 2025-01-08

## 2025-01-08 RX ORDER — POTASSIUM CHLORIDE 750 MG/1
10 CAPSULE, EXTENDED RELEASE ORAL DAILY
Qty: 90 CAPSULE | Refills: 1 | Status: SHIPPED | OUTPATIENT
Start: 2025-01-08

## 2025-01-08 RX ORDER — LEVOTHYROXINE SODIUM 50 UG/1
50 TABLET ORAL
Qty: 90 TABLET | Refills: 1 | Status: SHIPPED | OUTPATIENT
Start: 2025-01-08

## 2025-01-08 RX ORDER — BENAZEPRIL HYDROCHLORIDE 40 MG/1
40 TABLET ORAL NIGHTLY
Qty: 90 TABLET | Refills: 1 | Status: SHIPPED | OUTPATIENT
Start: 2025-01-08

## 2025-01-08 RX ORDER — BUPROPION HYDROCHLORIDE 300 MG/1
300 TABLET ORAL DAILY
Qty: 90 TABLET | Refills: 1 | Status: SHIPPED | OUTPATIENT
Start: 2025-01-08

## 2025-01-08 RX ORDER — ALLOPURINOL 100 MG/1
100 TABLET ORAL DAILY
Qty: 90 TABLET | Refills: 1 | Status: SHIPPED | OUTPATIENT
Start: 2025-01-08

## 2025-01-08 RX ORDER — ERGOCALCIFEROL 1.25 MG/1
50000 CAPSULE ORAL
Qty: 12 CAPSULE | Refills: 1 | Status: SHIPPED | OUTPATIENT
Start: 2025-01-08

## 2025-01-08 RX ORDER — ATORVASTATIN CALCIUM 80 MG/1
80 TABLET, FILM COATED ORAL DAILY
Qty: 90 TABLET | Refills: 1 | Status: SHIPPED | OUTPATIENT
Start: 2025-01-08

## 2025-01-08 RX ORDER — SPIRONOLACTONE 25 MG/1
25 TABLET ORAL DAILY
Qty: 90 TABLET | Refills: 1 | Status: SHIPPED | OUTPATIENT
Start: 2025-01-08

## 2025-01-08 RX ORDER — CARVEDILOL 25 MG/1
25 TABLET ORAL 2 TIMES DAILY
Qty: 180 TABLET | Refills: 1 | Status: SHIPPED | OUTPATIENT
Start: 2025-01-08

## 2025-01-08 NOTE — ASSESSMENT & PLAN NOTE
Rocephin IM today. Azithromycin and Benzonatate RX. Medication instructions and education given with understanding voiced. Rest, increase fluids. OTC antihistamines, decongestants, Ibuprofen, Tylenol as needed. RTC with worsening, new or persistent symptoms.

## 2025-01-08 NOTE — ASSESSMENT & PLAN NOTE
BP elevated today. Pt states it has been elevated at home the last month. Will increase Amlodipine to 10 mg daily.     Monitor BP daily and keep BP daily log to bring to next visit. Exercise at least 5 times a week. Keep scheduled appts. RTC sooner if BP is staying >140/90. Dash diet. Follow up 1 month for evaluation    DASH- includes foods rich in K+, calcium and Magnesium.The diet limits foods high in sodium, saturated fats and added sugars. This is a flexible balanced eating plan that helps create heart healthy eating style for life. Diet is rich in vegetable, whole grains and fruits. It includes fat free or low fat dairy products, fish, poultry, nuts. Chose foods high in K+, calcium, magnesium, fiber, protein, and low in saturated fats and sodium.

## 2025-01-08 NOTE — PROGRESS NOTES
"   [facility]   [facility]  [address]  [place]  [address]        PATIENT NAME: Becki Mejia  : 1960  DATE: 25  MRN: [id number]        Reason for Visit / Chief Complaint: Nasal Congestion (Patient is a 64 year old female who presents to the clinic related to runny nose, nasal congestion  x 5 days.  Patient has been taking OTC cold medication(coricidin) with no relief. ), Sore Throat (Patient reports sore throat x 3 days, lost voice yesterday. ), Cough (Patient reports cough x 4 days ago, coughing up light green phlegm,  coughing worse since Saturday.  Patient reports she did do a breathing treatment last night.  ), Headache (Patient reports headache since yesterday, but states, "could be blood pressure"), and Palpitations (Patient reports palpitations when lying on either side & her blood pressure goes up the last 2 months )       Update PCP  Update Chief Complaint         History of Present Illness / Problem Focused Workflow     Becki Mejia presents to the clinic with Nasal Congestion (Patient is a 64 year old female who presents to the clinic related to runny nose, nasal congestion  x 5 days.  Patient has been taking OTC cold medication(coricidin) with no relief. ), Sore Throat (Patient reports sore throat x 3 days, lost voice yesterday. ), Cough (Patient reports cough x 4 days ago, coughing up light green phlegm,  coughing worse since Saturday.  Patient reports she did do a breathing treatment last night.  ), Headache (Patient reports headache since yesterday, but states, "could be blood pressure"), and Palpitations (Patient reports palpitations when lying on either side & her blood pressure goes up the last 2 months )       Patient is a 64 year old female who presents to the clinic related to runny nose, nasal congestion  x 5 days.  Patient has been taking OTC cold medication(coricidin) with no relief.   Sore Throat Patient reports sore throat x 3 days, " "lost voice yesterday.  Cough Patient reports cough x 4 days ago, coughing up light green phlegm,  coughing worse since Saturday.  Patient reports she did do a breathing treatment last night.    Headache Patient reports headache since yesterday, but states, "could be blood pressure"        Sore Throat   This is a new problem. The current episode started in the past 7 days. The problem has been gradually worsening. There has been no fever. Associated symptoms include congestion and coughing. Pertinent negatives include no abdominal pain, diarrhea, ear discharge, ear pain, headaches, shortness of breath, stridor or vomiting. She has tried nothing for the symptoms. The treatment provided no relief.   Cough  This is a new problem. The current episode started in the past 7 days. The problem has been gradually worsening. The problem occurs every few minutes. The cough is Productive of sputum. Associated symptoms include nasal congestion and a sore throat. Pertinent negatives include no chills, ear pain, fever, headaches, myalgias, rhinorrhea, shortness of breath or wheezing. She has tried OTC cough suppressant for the symptoms. The treatment provided no relief.       Review of Systems     Review of Systems   Constitutional:  Negative for chills, fatigue and fever.   HENT:  Positive for nasal congestion, sinus pressure/congestion and sore throat. Negative for ear discharge, ear pain and rhinorrhea.    Respiratory:  Positive for cough. Negative for chest tightness, shortness of breath, wheezing and stridor.    Cardiovascular:  Negative for palpitations and claudication.   Gastrointestinal:  Negative for abdominal pain, constipation, diarrhea, nausea, vomiting and reflux.   Genitourinary:  Negative for dysuria, flank pain, frequency, hematuria and urgency.   Musculoskeletal:  Negative for myalgias.   Neurological:  Negative for dizziness, weakness, light-headedness and headaches.   Psychiatric/Behavioral:  Negative for " suicidal ideas.         Medical / Social / Family History     Past Medical History:   Diagnosis Date    Coronary artery disease     Depression     Diverticulosis     Fibromyalgia     Fibromyalgia     Heart disease     Hyperlipidemia     Hypertension     Hypothyroidism     Mass of adrenal gland     Osteopenia     Ventral hernia        Past Surgical History:   Procedure Laterality Date    ADENOIDECTOMY      ANKLE FRACTURE SURGERY Right     shattered ankle    APPENDECTOMY      CARDIAC SURGERY      Open Heart Surgery    CORONARY ARTERY BYPASS GRAFT      HIP FRACTURE SURGERY Right     HYSTERECTOMY      TOTAL    KNEE SURGERY Right     meniscus tear    OOPHORECTOMY      SPINE SURGERY      L4 and L5    TIBIA FRACTURE SURGERY Right     TONSILLECTOMY         Social History  Ms.  reports that she quit smoking about 18 years ago. Her smoking use included cigarettes. She started smoking about 37 years ago. She has a 19 pack-year smoking history. She has been exposed to tobacco smoke. She has never used smokeless tobacco. She reports current alcohol use. She reports that she does not use drugs.    Family History  Ms.'s family history includes Breast cancer in her paternal great-grandmother; Colon cancer in her sister; Diabetes in her father, mother, paternal grandmother, and sister; Hepatitis in her son; Hypertension in her father, mother, and son; Hypoglycemic in her sister; Liver cancer in her sister; No Known Problems in her brother and daughter; Parkinsonism in her father; Post-traumatic stress disorder in her son; Uterine cancer in her mother.    Medications and Allergies     Medications  Outpatient Medications Marked as Taking for the 1/6/25 encounter (Office Visit) with Russell Judd FNP   Medication Sig Dispense Refill    allopurinoL (ZYLOPRIM) 100 MG tablet Take 1 tablet (100 mg total) by mouth once daily. 90 tablet 1    amLODIPine (NORVASC) 5 MG tablet Take 1 tablet (5 mg total) by mouth daily as needed (If blood  pressure is over 120/80). 90 tablet 1    aspirin (ECOTRIN) 81 MG EC tablet Take 81 mg by mouth once daily.      atorvastatin (LIPITOR) 80 MG tablet Take 1 tablet (80 mg total) by mouth once daily. 90 tablet 1    benazepriL (LOTENSIN) 40 MG tablet Take 1 tablet (40 mg total) by mouth every evening. 90 tablet 1    buPROPion (WELLBUTRIN XL) 300 MG 24 hr tablet Take 1 tablet (300 mg total) by mouth once daily. 30 tablet 11    carvediloL (COREG) 25 MG tablet Take 1 tablet (25 mg total) by mouth 2 (two) times daily. 180 tablet 0    diclofenac (VOLTAREN) 75 MG EC tablet Take 1 tablet (75 mg total) by mouth 2 (two) times daily. 60 tablet 1    diphenhydrAMINE (SOMINEX) 25 mg tablet Take 25 mg by mouth nightly as needed for Insomnia.      docusate sodium (COLACE) 100 MG capsule Take 100 mg by mouth 2 (two) times daily.      ergocalciferol (ERGOCALCIFEROL) 50,000 unit Cap Take 1 capsule (50,000 Units total) by mouth every 7 days. 4 capsule 2    FEROSUL 325 mg (65 mg iron) Tab tablet TAKE 1 TABLET ONE TIME DAILY 90 tablet 1    levothyroxine (SYNTHROID) 50 MCG tablet Take 1 tablet (50 mcg total) by mouth before breakfast. 90 tablet 1    naproxen (NAPROSYN) 500 MG tablet Take 1 tablet twice daily as needed for headache, not more than 3 days of the week 20 tablet 1    nitroGLYCERIN (NITROSTAT) 0.4 MG SL tablet Place 1 tablet (0.4 mg total) under the tongue every 5 (five) minutes as needed for Chest pain. 30 tablet 0    potassium chloride (MICRO-K) 10 MEQ CpSR TAKE 1 CAPSULE ONE TIME DAILY 90 capsule 3    spironolactone (ALDACTONE) 25 MG tablet Take 1 tablet (25 mg total) by mouth once daily. 90 tablet 1    tiZANidine (ZANAFLEX) 4 MG tablet Take 4 mg by mouth every 6 (six) hours as needed.         Allergies  Review of patient's allergies indicates:   Allergen Reactions    Codeine Swelling    Codeine phosphate Other (See Comments)    Tetanus and diphtheria toxoids Itching    Tetanus immune globulin Other (See Comments)  "      Physical Examination   BP (!) 172/74 (BP Location: Right arm, Patient Position: Sitting)   Pulse 70   Temp 97.7 °F (36.5 °C) (Oral)   Resp (!) 24   Ht 5' 4" (1.626 m)   Wt 80.6 kg (177 lb 12.8 oz)   LMP  (LMP Unknown)   SpO2 97%   BMI 30.52 kg/m²    Physical Exam  Vitals and nursing note reviewed.   Constitutional:       General: She is awake.      Appearance: Normal appearance.   HENT:      Head: Normocephalic.      Right Ear: Tympanic membrane, ear canal and external ear normal.      Left Ear: Tympanic membrane, ear canal and external ear normal.      Nose: Congestion present.      Right Turbinates: Swollen.      Left Turbinates: Swollen.      Right Sinus: Maxillary sinus tenderness present.      Left Sinus: Maxillary sinus tenderness present.      Mouth/Throat:      Lips: Pink.      Mouth: Mucous membranes are moist.      Pharynx: Oropharynx is clear. Uvula midline. Posterior oropharyngeal erythema and postnasal drip present.   Cardiovascular:      Rate and Rhythm: Normal rate and regular rhythm.      Heart sounds: Normal heart sounds, S1 normal and S2 normal.   Pulmonary:      Effort: Pulmonary effort is normal. No respiratory distress.      Breath sounds: Normal breath sounds. No decreased breath sounds, wheezing, rhonchi or rales.   Abdominal:      General: Bowel sounds are normal.      Palpations: Abdomen is soft.      Tenderness: There is no abdominal tenderness.   Musculoskeletal:      Cervical back: Normal range of motion.   Skin:     General: Skin is warm.      Capillary Refill: Capillary refill takes less than 2 seconds.   Neurological:      Mental Status: She is alert and oriented to person, place, and time.   Psychiatric:         Thought Content: Thought content does not include homicidal or suicidal ideation. Thought content does not include homicidal or suicidal plan.          Assessment and Plan (including Health Maintenance)      Problem List  Smart Sets  Document Outside HM "   :      Health Maintenance Due   Topic Date Due    Hepatitis C Screening  Never done    HIV Screening  Never done    Pneumococcal Vaccines (Age 50+) (1 of 1 - PCV) Never done    Shingles Vaccine (2 of 2) 10/07/2022    COVID-19 Vaccine (5 - 2024-25 season) 09/01/2024    Lipid Panel  01/18/2025    Mammogram  02/28/2025       Problem List Items Addressed This Visit       Essential hypertension    Current Assessment & Plan     BP elevated today. Pt states it has been elevated at home the last month. Will increase Amlodipine to 10 mg daily.     Monitor BP daily and keep BP daily log to bring to next visit. Exercise at least 5 times a week. Keep scheduled appts. RTC sooner if BP is staying >140/90. Dash diet. Follow up 1 month for evaluation    DASH- includes foods rich in K+, calcium and Magnesium.The diet limits foods high in sodium, saturated fats and added sugars. This is a flexible balanced eating plan that helps create heart healthy eating style for life. Diet is rich in vegetable, whole grains and fruits. It includes fat free or low fat dairy products, fish, poultry, nuts. Chose foods high in K+, calcium, magnesium, fiber, protein, and low in saturated fats and sodium.          Acute maxillary sinusitis - Primary    Current Assessment & Plan     Rocephin IM today. Azithromycin and Benzonatate RX. Medication instructions and education given with understanding voiced. Rest, increase fluids. OTC antihistamines, decongestants, Ibuprofen, Tylenol as needed. RTC with worsening, new or persistent symptoms.           Relevant Medications    azithromycin (Z-JOSEFINA) 250 MG tablet    benzonatate (TESSALON) 200 MG capsule     Other Visit Diagnoses       Nasal congestion        Relevant Orders    POCT COVID-19 Rapid Screening (Completed)    POCT Influenza A/B Molecular (Completed)    POCT Strep A, Molecular (Completed)    Sore throat        Relevant Orders    POCT COVID-19 Rapid Screening (Completed)    POCT Influenza A/B  Molecular (Completed)    POCT Strep A, Molecular (Completed)    Cough, unspecified type        Relevant Orders    POCT COVID-19 Rapid Screening (Completed)    POCT Influenza A/B Molecular (Completed)    POCT Strep A, Molecular (Completed)            Health Maintenance Topics with due status: Not Due       Topic Last Completion Date    Colorectal Cancer Screening 03/24/2023    Hemoglobin A1c (Diabetic Prevention Screening) 01/18/2024    DEXA Scan 09/16/2024    Aspirin/Antiplatelet Therapy 01/06/2025       Future Appointments   Date Time Provider Department Center   2/18/2025 10:30 AM Hector Pagan FNP OBC NEURO [facility]   3/5/2025  1:00 PM [id number] OB [facility]   3/20/2025  9:45 AM Pari Kirk ACNP OBC CARD [facility]   9/4/2025  8:00 [facility]            Signature:      ASHA AGUILAR [address] [place]  [address]       Date of encounter: 1/6/25

## 2025-02-07 ENCOUNTER — PATIENT OUTREACH (OUTPATIENT)
Facility: HOSPITAL | Age: 65
End: 2025-02-07
Payer: MEDICARE

## 2025-02-07 NOTE — PROGRESS NOTES
Population Health Chart Review & Patient Outreach Details    Updates Requested / Reviewed:  [x]  Care Team Updated      Health Maintenance Topics Addressed and Outreach Outcomes / Actions Taken:  Blood Pressure Control [x] Telephone outreach for remote bp reading. No answer, left vm.

## 2025-02-18 ENCOUNTER — OFFICE VISIT (OUTPATIENT)
Dept: NEUROLOGY | Facility: CLINIC | Age: 65
End: 2025-02-18
Payer: MEDICARE

## 2025-02-18 VITALS
OXYGEN SATURATION: 97 % | HEART RATE: 61 BPM | HEIGHT: 64 IN | SYSTOLIC BLOOD PRESSURE: 120 MMHG | WEIGHT: 179.38 LBS | BODY MASS INDEX: 30.63 KG/M2 | DIASTOLIC BLOOD PRESSURE: 60 MMHG

## 2025-02-18 DIAGNOSIS — G31.84 MILD COGNITIVE IMPAIRMENT: ICD-10-CM

## 2025-02-18 DIAGNOSIS — F41.9 ANXIETY: ICD-10-CM

## 2025-02-18 DIAGNOSIS — F32.A DEPRESSION, UNSPECIFIED DEPRESSION TYPE: ICD-10-CM

## 2025-02-18 DIAGNOSIS — G43.001 MIGRAINE WITHOUT AURA AND WITH STATUS MIGRAINOSUS, NOT INTRACTABLE: Primary | ICD-10-CM

## 2025-02-18 PROCEDURE — 99215 OFFICE O/P EST HI 40 MIN: CPT | Mod: PBBFAC | Performed by: NURSE PRACTITIONER

## 2025-02-18 RX ORDER — NAPROXEN 500 MG/1
TABLET ORAL
Qty: 20 TABLET | Refills: 1 | Status: SHIPPED | OUTPATIENT
Start: 2025-02-18

## 2025-02-18 NOTE — PROGRESS NOTES
Subjective:       Patient ID: Becki Mejia is a 64 y.o. female     Chief Complaint:    Chief Complaint   Patient presents with    Follow-up        Allergies:  Codeine, Codeine phosphate, Tetanus and diphtheria toxoids, and Tetanus immune globulin    Current Medications:    Outpatient Encounter Medications as of 2/18/2025   Medication Sig Dispense Refill    allopurinoL (ZYLOPRIM) 100 MG tablet Take 1 tablet (100 mg total) by mouth once daily. 90 tablet 1    amLODIPine (NORVASC) 5 MG tablet Take 1 tablet (5 mg total) by mouth daily as needed (If blood pressure is over 120/80). 90 tablet 1    aspirin (ECOTRIN) 81 MG EC tablet Take 81 mg by mouth once daily.      atorvastatin (LIPITOR) 80 MG tablet Take 1 tablet (80 mg total) by mouth once daily. 90 tablet 1    benazepriL (LOTENSIN) 40 MG tablet Take 1 tablet (40 mg total) by mouth every evening. 90 tablet 1    buPROPion (WELLBUTRIN XL) 300 MG 24 hr tablet Take 1 tablet (300 mg total) by mouth once daily. 90 tablet 1    carvediloL (COREG) 25 MG tablet Take 1 tablet (25 mg total) by mouth 2 (two) times daily. 180 tablet 1    diphenhydrAMINE (SOMINEX) 25 mg tablet Take 25 mg by mouth nightly as needed for Insomnia.      docusate sodium (COLACE) 100 MG capsule Take 100 mg by mouth 2 (two) times daily.      ergocalciferol (ERGOCALCIFEROL) 50,000 unit Cap Take 1 capsule (50,000 Units total) by mouth every 7 days. 12 capsule 1    FEROSUL 325 mg (65 mg iron) Tab tablet TAKE 1 TABLET ONE TIME DAILY 90 tablet 1    levothyroxine (SYNTHROID) 50 MCG tablet Take 1 tablet (50 mcg total) by mouth before breakfast. 90 tablet 1    nitroGLYCERIN (NITROSTAT) 0.4 MG SL tablet Place 1 tablet (0.4 mg total) under the tongue every 5 (five) minutes as needed for Chest pain. 30 tablet 0    potassium chloride (MICRO-K) 10 MEQ CpSR Take 1 capsule (10 mEq total) by mouth once daily. 90 capsule 1    spironolactone (ALDACTONE) 25 MG tablet Take 1 tablet (25 mg total) by mouth once daily.  90 tablet 1    tiZANidine (ZANAFLEX) 4 MG tablet Take 4 mg by mouth every 6 (six) hours as needed.      [DISCONTINUED] diclofenac (VOLTAREN) 75 MG EC tablet Take 1 tablet (75 mg total) by mouth 2 (two) times daily. 180 tablet 1    [DISCONTINUED] naproxen (NAPROSYN) 500 MG tablet Take 1 tablet twice daily as needed for headache, not more than 3 days of the week 20 tablet 1    hydrOXYzine HCL (ATARAX) 25 MG tablet TAKE 1 TABLET (25 MG TOTAL) BY MOUTH EVERY EVENING. (Patient not taking: Reported on 2/18/2025) 90 tablet 1    naproxen (NAPROSYN) 500 MG tablet Take 1 tablet twice daily as needed for headache, not more than 3 days of the week 20 tablet 1     No facility-administered encounter medications on file as of 2/18/2025.       History of Present Illness  63 y/o female following in neurology for reported memory impairment and migraine    Reported memory complications in early 2024.  more issue with forgetting things like leaving things on the stove, such as tea on the stove and she had left going to [employer].  Had hummingbird feed on stove and forgot it as well.  Denies noting repeating questions, denies word searching, driving fine, no recent accidents.  Not having trouble remembering names.  ADLs without difficulty.  She does question if stress might be a factor.  MMSE 30/30 here in clinic at initial visit, so actually quite good  She is on high dosing wellbutrin for anxiety/depression which too the stress can be a factor, so too can the mediation.    She was taking vistaril nightly as well as over the counter sleep aid (another antihistamine) which I did recommend she not take both    She was on topamax 50mg bid for migraine, I did discuss possibly it might have cognitive complications which it is well known for  we tapered her off of it to ensure it was not adding to her symptoms of cognition issue.  I did give her naproxen to use PRN.  She denies any worsening in headaches with stopping the topamax, the naproxen  seems to work very well.      I obtained MRI brain 9/24/24, no acute findings, though there was moderate small vessel disease.  Dementia panel labs not revealing.           Review of Systems  Review of Systems   Constitutional:  Negative for diaphoresis and fever.   HENT:  Negative for congestion, hearing loss and tinnitus.    Eyes:  Negative for blurred vision, double vision, photophobia, discharge and redness.   Respiratory:  Negative for cough and shortness of breath.    Cardiovascular:  Negative for chest pain.   Gastrointestinal:  Negative for abdominal pain, nausea and vomiting.   Musculoskeletal:  Negative for back pain, joint pain, myalgias and neck pain.   Skin:  Negative for itching and rash.   Neurological:  Negative for dizziness, tremors, sensory change, speech change, focal weakness, seizures, loss of consciousness, weakness and headaches.   Psychiatric/Behavioral:  Positive for memory loss. Negative for depression and hallucinations. The patient does not have insomnia.    All other systems reviewed and are negative.     Objective:     NEUROLOGICAL EXAMINATION:     MENTAL STATUS   Oriented to person, place, and time.   Attention: normal. Concentration: normal.   Speech: speech is normal   Level of consciousness: alert  Knowledge: good and consistent with education.   Normal comprehension.     CRANIAL NERVES     CN II   Visual fields full to confrontation.   Visual acuity: normal  Right visual field deficit: none  Left visual field deficit: none     CN III, IV, VI   Pupils are equal, round, and reactive to light.  Extraocular motions are normal.   Right pupil: Size: 3 mm. Shape: regular. Reactivity: brisk. Consensual response: intact. Accommodation: intact.   Left pupil: Size: 3 mm. Shape: regular. Reactivity: brisk. Consensual response: intact. Accommodation: intact.   CN III: no CN III palsy  CN VI: no CN VI palsy  Nystagmus: none   Diplopia: none  Upgaze: normal  Downgaze: normal  Conjugate gaze:  present  Vestibulo-ocular reflex: present    CN V   Facial sensation intact.   Right facial sensation deficit: none  Left facial sensation deficit: none  Right corneal reflex: normal  Left corneal reflex: normal    CN VII   Facial expression full, symmetric.   Right facial weakness: none  Left facial weakness: none  Right taste: normal  Left taste: normal    CN VIII   CN VIII normal.   Hearing: intact    CN IX, X   CN IX normal.   CN X normal.   Palate: symmetric    CN XI   CN XI normal.   Right sternocleidomastoid strength: normal  Left sternocleidomastoid strength: normal  Right trapezius strength: normal  Left trapezius strength: normal    CN XII   CN XII normal.   Tongue: not atrophic  Fasciculations: absent  Tongue deviation: none    MOTOR EXAM   Muscle bulk: normal  Overall muscle tone: normal  Right arm tone: normal  Left arm tone: normal  Right arm pronator drift: absent  Left arm pronator drift: absent  Right leg tone: normal  Left leg tone: normal    Strength   Right neck flexion: 5/5  Left neck flexion: 5/5  Right neck extension: 5/5  Left neck extension: 5/5  Right deltoid: 5/5  Left deltoid: 5/5  Right biceps: 5/5  Left biceps: 5/5  Right triceps: 5/5  Left triceps: 5/5  Right wrist flexion: 5/5  Left wrist flexion: 5/5  Right wrist extension: 5/5  Left wrist extension: 5/5  Right interossei: 5/5  Left interossei: 5/5  Right iliopsoas: 5/5  Left iliopsoas: 5/5  Right quadriceps: 5/5  Left quadriceps: 5/5  Right hamstrin/5  Left hamstrin/5  Right anterior tibial: 5/5  Left anterior tibial: 5/5  Right posterior tibial: 5/5  Left posterior tibial: 5/5  Right gastroc: 5/5  Left gastroc: 5/5    REFLEXES     Reflexes   Right brachioradialis: 2+  Left brachioradialis: 2+  Right biceps: 2+  Left biceps: 2+  Right triceps: 2+  Left triceps: 2+  Right patellar: 2+  Left patellar: 2+  Right achilles: 2+  Left achilles: 2+  Right plantar: normal  Left plantar: normal  Right Morales: absent  Left Morales:  absent  Right ankle clonus: absent  Left ankle clonus: absent  Right pendular knee jerk: absent  Left pendular knee jerk: absent    SENSORY EXAM   Light touch normal.   Right arm light touch: normal  Left arm light touch: normal  Right leg light touch: normal  Left leg light touch: normal  Vibration normal.   Right arm vibration: normal  Left arm vibration: normal  Right leg vibration: normal  Left leg vibration: normal  Proprioception normal.   Right arm proprioception: normal  Left arm proprioception: normal  Right leg proprioception: normal  Left leg proprioception: normal  Pinprick normal.   Right arm pinprick: normal  Left arm pinprick: normal  Right leg pinprick: normal  Left leg pinprick: normal  Graphesthesia: normal  Romberg: negative  Stereognosis: normal    GAIT AND COORDINATION     Gait  Gait: normal     Coordination   Finger to nose coordination: normal  Heel to shin coordination: normal  Tandem walking coordination: normal    Tremor   Resting tremor: absent  Intention tremor: absent  Action tremor: absent       Physical Exam  Vitals and nursing note reviewed.   Constitutional:       Appearance: Normal appearance.   HENT:      Head: Normocephalic.   Eyes:      Extraocular Movements: Extraocular movements intact and EOM normal.      Pupils: Pupils are equal, round, and reactive to light.   Cardiovascular:      Rate and Rhythm: Normal rate and regular rhythm.   Pulmonary:      Effort: Pulmonary effort is normal.      Breath sounds: Normal breath sounds.   Musculoskeletal:         General: No swelling or tenderness. Normal range of motion.      Cervical back: Normal range of motion and neck supple.      Right lower leg: No edema.      Left lower leg: No edema.   Skin:     General: Skin is warm and dry.      Coloration: Skin is not jaundiced.      Findings: No rash.   Neurological:      General: No focal deficit present.      Mental Status: She is alert and oriented to person, place, and time.      GCS: GCS  eye subscore is 4. GCS verbal subscore is 5. GCS motor subscore is 6.      Cranial Nerves: No cranial nerve deficit.      Sensory: No sensory deficit.      Motor: Motor function is intact. No weakness.      Coordination: Coordination is intact. Coordination normal. Finger-Nose-Finger Test, Heel to Shin Test and Romberg Test normal.      Gait: Gait is intact. Gait and tandem walk normal.      Deep Tendon Reflexes: Reflexes normal.      Reflex Scores:       Tricep reflexes are 2+ on the right side and 2+ on the left side.       Bicep reflexes are 2+ on the right side and 2+ on the left side.       Brachioradialis reflexes are 2+ on the right side and 2+ on the left side.       Patellar reflexes are 2+ on the right side and 2+ on the left side.       Achilles reflexes are 2+ on the right side and 2+ on the left side.  Psychiatric:         Mood and Affect: Mood normal.         Speech: Speech normal.         Behavior: Behavior normal.          Assessment:     Problem List Items Addressed This Visit          Neuro    Migraine without aura and with status migrainosus, not intractable - Primary    Relevant Medications    naproxen (NAPROSYN) 500 MG tablet    Mild cognitive impairment       Psychiatric    Depression    Anxiety            Primary Diagnosis and ICD10  Migraine without aura and with status migrainosus, not intractable [G43.001]    Plan:     Patient Instructions     MMSE 30/30 in clinic  Continue the naproxen as directed as needed for migraines      Medications Discontinued During This Encounter   Medication Reason    diclofenac (VOLTAREN) 75 MG EC tablet     naproxen (NAPROSYN) 500 MG tablet Reorder         Requested Prescriptions     Signed Prescriptions Disp Refills    naproxen (NAPROSYN) 500 MG tablet 20 tablet 1     Sig: Take 1 tablet twice daily as needed for headache, not more than 3 days of the week       No orders of the defined types were placed in this encounter.

## 2025-02-21 ENCOUNTER — PATIENT MESSAGE (OUTPATIENT)
Dept: NEUROLOGY | Facility: CLINIC | Age: 65
End: 2025-02-21
Payer: MEDICARE

## 2025-03-05 ENCOUNTER — HOSPITAL ENCOUNTER (OUTPATIENT)
Dept: RADIOLOGY | Facility: HOSPITAL | Age: 65
Discharge: HOME | End: 2025-03-05
Attending: RADIOLOGY
Payer: MEDICARE

## 2025-03-05 VITALS — HEIGHT: 64 IN | BODY MASS INDEX: 29.88 KG/M2 | WEIGHT: 175 LBS

## 2025-03-05 DIAGNOSIS — Z12.31 VISIT FOR SCREENING MAMMOGRAM: ICD-10-CM

## 2025-03-05 PROCEDURE — 77067 SCR MAMMO BI INCL CAD: CPT | Mod: TC

## 2025-03-20 ENCOUNTER — OFFICE VISIT (OUTPATIENT)
Dept: CARDIOLOGY | Facility: CLINIC | Age: 65
End: 2025-03-20
Payer: MEDICARE

## 2025-03-20 VITALS
BODY MASS INDEX: 31.07 KG/M2 | OXYGEN SATURATION: 98 % | HEIGHT: 64 IN | DIASTOLIC BLOOD PRESSURE: 78 MMHG | HEART RATE: 56 BPM | SYSTOLIC BLOOD PRESSURE: 140 MMHG | WEIGHT: 182 LBS

## 2025-03-20 DIAGNOSIS — E03.9 HYPOTHYROIDISM, UNSPECIFIED TYPE: ICD-10-CM

## 2025-03-20 DIAGNOSIS — I10 ESSENTIAL HYPERTENSION: ICD-10-CM

## 2025-03-20 DIAGNOSIS — I07.1 MILD TRICUSPID REGURGITATION: ICD-10-CM

## 2025-03-20 DIAGNOSIS — I25.119 CORONARY ARTERY DISEASE INVOLVING NATIVE CORONARY ARTERY OF NATIVE HEART WITH ANGINA PECTORIS: ICD-10-CM

## 2025-03-20 DIAGNOSIS — R07.89 ATYPICAL CHEST PAIN: ICD-10-CM

## 2025-03-20 DIAGNOSIS — R07.9 CHEST PAIN, UNSPECIFIED TYPE: ICD-10-CM

## 2025-03-20 DIAGNOSIS — E78.5 HYPERLIPIDEMIA, UNSPECIFIED HYPERLIPIDEMIA TYPE: ICD-10-CM

## 2025-03-20 DIAGNOSIS — Z79.899 HIGH RISK MEDICATION USE: ICD-10-CM

## 2025-03-20 DIAGNOSIS — I25.10 CORONARY ARTERY DISEASE, UNSPECIFIED VESSEL OR LESION TYPE, UNSPECIFIED WHETHER ANGINA PRESENT, UNSPECIFIED WHETHER NATIVE OR TRANSPLANTED HEART: Primary | ICD-10-CM

## 2025-03-20 PROCEDURE — 3077F SYST BP >= 140 MM HG: CPT | Mod: CPTII,,, | Performed by: NURSE PRACTITIONER

## 2025-03-20 PROCEDURE — 1159F MED LIST DOCD IN RCRD: CPT | Mod: CPTII,,, | Performed by: NURSE PRACTITIONER

## 2025-03-20 PROCEDURE — 3008F BODY MASS INDEX DOCD: CPT | Mod: CPTII,,, | Performed by: NURSE PRACTITIONER

## 2025-03-20 PROCEDURE — 3288F FALL RISK ASSESSMENT DOCD: CPT | Mod: CPTII,,, | Performed by: NURSE PRACTITIONER

## 2025-03-20 PROCEDURE — 1160F RVW MEDS BY RX/DR IN RCRD: CPT | Mod: CPTII,,, | Performed by: NURSE PRACTITIONER

## 2025-03-20 PROCEDURE — 99215 OFFICE O/P EST HI 40 MIN: CPT | Mod: PBBFAC | Performed by: NURSE PRACTITIONER

## 2025-03-20 PROCEDURE — 99999 PR PBB SHADOW E&M-EST. PATIENT-LVL V: CPT | Mod: PBBFAC,,, | Performed by: NURSE PRACTITIONER

## 2025-03-20 PROCEDURE — 1101F PT FALLS ASSESS-DOCD LE1/YR: CPT | Mod: CPTII,,, | Performed by: NURSE PRACTITIONER

## 2025-03-20 PROCEDURE — 93010 ELECTROCARDIOGRAM REPORT: CPT | Mod: S$PBB,,, | Performed by: INTERNAL MEDICINE

## 2025-03-20 PROCEDURE — 4010F ACE/ARB THERAPY RXD/TAKEN: CPT | Mod: CPTII,,, | Performed by: NURSE PRACTITIONER

## 2025-03-20 PROCEDURE — 3078F DIAST BP <80 MM HG: CPT | Mod: CPTII,,, | Performed by: NURSE PRACTITIONER

## 2025-03-20 PROCEDURE — 1125F AMNT PAIN NOTED PAIN PRSNT: CPT | Mod: CPTII,,, | Performed by: NURSE PRACTITIONER

## 2025-03-20 PROCEDURE — 99214 OFFICE O/P EST MOD 30 MIN: CPT | Mod: S$PBB,,, | Performed by: NURSE PRACTITIONER

## 2025-03-20 PROCEDURE — 93005 ELECTROCARDIOGRAM TRACING: CPT | Mod: PBBFAC | Performed by: INTERNAL MEDICINE

## 2025-03-20 NOTE — PROGRESS NOTES
"PCP: Russell Judd FNP    Referring Provider:   Chief Complaint   Patient presents with    Coronary Artery Disease    Chest Pain     Mainly at night when laying down    Palpitations     Mainly at night when laying down.    Shortness of Breath     With exertion       Subjective:   Becki Mejia is a 64 y.o. female with hx of CAD s/p CABG 8/2017 for LM disease, HTN and HLD,  who presents for routine follow up. Patient reports intermittent episodes substernal chest pressure; occurring once a week for the last month. This is associated with "heart pounding" and the sensation that she "can't take a deep breath". This occurs both at rest (lying on her side and at times relieves with changing position) and at times after physical exertion. If changing position does not resolve the discomfort, she will take an amlodipine and the discomfort resolves in 20-30 minutes. She has not used her sublingual ntg. She has noticed that she is more likely to have an issue if she forgets to take her amlodipine. These symptoms are similar to the issues she was having prior to her CABG. She has not had any similar issues until approx one month to six weeks ago.     Schedule Lexiscan/echo  Lipids and TSH  (same days as Ayah and echo)  Patient can not walk for EST due to right hip and ankle issues.    9/19/2024 presents for routine follow up. She states that she has done well. She has chronic, stable CARNES. She reports an episode in 1/2024 for which she was evaluated by her PCP. She has had none since single episode in 1/2024 and no sublingual ntg use    9/19/2023 presents for routine follow up. She states that she is doing well. She does have chronic, stable CARNES. Her only other issues is a cramp like pain to her BLE (calves) with ambulation of <1/4 mild c/w claudication. She is concerned that this is r/t her statin therapy. She has been on this dose for years.     3/13/2023 presents for routine follow up. She does not exercise but " remains physically active and asymptomtic.         Fhx:  Family History   Problem Relation Name Age of Onset    Diabetes Mother      Hypertension Mother      Uterine cancer Mother      Parkinsonism Father      Diabetes Father      Hypertension Father      Colon cancer Sister      Hypoglycemic Sister      Liver cancer Sister      Diabetes Sister      No Known Problems Daughter      Diabetes Paternal Grandmother      No Known Problems Brother      Hepatitis Son          C    Post-traumatic stress disorder Son      Hypertension Son      Breast cancer Paternal Great-Grandmother       Shx:   Social History     Socioeconomic History    Marital status:      Spouse name: Harper    Number of children: 3   Occupational History     Comment: DISABILITY   Tobacco Use    Smoking status: Former     Current packs/day: 0.00     Average packs/day: 1 pack/day for 19.0 years (19.0 ttl pk-yrs)     Types: Cigarettes     Start date:      Quit date:      Years since quittin.2     Passive exposure: Past    Smokeless tobacco: Never    Tobacco comments:     smoked off and on   Substance and Sexual Activity    Alcohol use: Yes     Comment: occasional    Drug use: Never    Sexual activity: Yes     Partners: Male     Birth control/protection: See Surgical Hx   Social History Narrative    Lives in home with   and 2 grandchildren     Social Drivers of Health     Financial Resource Strain: Low Risk  (2024)    Overall Financial Resource Strain (CARDIA)     Difficulty of Paying Living Expenses: Not hard at all   Food Insecurity: No Food Insecurity (2024)    Hunger Vital Sign     Worried About Running Out of Food in the Last Year: Never true     Ran Out of Food in the Last Year: Never true   Transportation Needs: No Transportation Needs (2024)    PRAPARE - Transportation     Lack of Transportation (Medical): No     Lack of Transportation (Non-Medical): No   Physical Activity: Inactive (2024)    Exercise  Vital Sign     Days of Exercise per Week: 0 days     Minutes of Exercise per Session: 0 min   Stress: Stress Concern Present (8/30/2024)    [employer] - Occupational Stress Questionnaire     Feeling of Stress : To some extent   Housing Stability: Unknown (8/30/2024)    Housing Stability Vital Sign     Unable to Pay for Housing in the Last Year: No     Homeless in the Last Year: No       EKG   3/20/2025 RSR with HR 61 bpm; normal EKG  9/19/2024 RSR with HR 67 bpm; minimal voltage criteria for LVH; when compared with EKG 3/19/2024 no significant change was found  3/19/2024 RSR with HR 64 bpm; normal EKG; when compared with EKG 9/19/2023 no significant change was found.  9/19/2023 RSR with HR 65 bpm; normal EKG  3/13/2023 RSR with HR 67 bpm; minimal voltage criteria for LVH  8/9/2022 RSR with HR 65 bpm; minim al voltage criteria for LVH    ECHO 11/4/2019  Mild concentric Lvh with normal VL size and systolic funciton, EF 45%  Trace MR with normal LA Size  Aortic valve sclerosis without stenosis. Normal aortic root size  Mild TR with o/w normal right heart chambers and valves. RVSP 265 mmHG + RAP  Since the study of 9/27/2019 there is no significant change.    [facility] 10/29/2019  Severe single vessel CAD involving the prox LCX  Patent svg to the OM  Atrophic LIMA to the diagonal branch of the LAD  Normal LV size and systolic function, EF 55%  No significant MR  LVDD., LVEDP 18 mmHg        Lab Results   Component Value Date     (L) 09/06/2024    K 4.1 09/06/2024    CL 99 09/06/2024    CO2 28 09/06/2024    BUN 9 09/06/2024    CREATININE 0.85 09/06/2024    CALCIUM 8.7 09/06/2024    ANIONGAP 7 09/06/2024    EGFRNONAA 64 04/18/2022       Lab Results   Component Value Date    CHOL 160 01/18/2024    CHOL 144 07/17/2023    CHOL 153 01/17/2023     Lab Results   Component Value Date    HDL 62 (H) 01/18/2024    HDL 59 07/17/2023    HDL 56 01/17/2023     Lab Results   Component Value Date    LDLCALC  86 01/18/2024    LDLCALC 73 07/17/2023    LDLCALC 80 01/17/2023     Lab Results   Component Value Date    TRIG 61 01/18/2024    TRIG 59 07/17/2023    TRIG 87 01/17/2023     Lab Results   Component Value Date    CHOLHDL 2.6 01/18/2024    CHOLHDL 2.4 07/17/2023    CHOLHDL 2.7 01/17/2023       Lab Results   Component Value Date    WBC 5.67 09/06/2024    HGB 11.8 (L) 09/06/2024    HCT 36.0 (L) 09/06/2024    MCV 89.3 09/06/2024     09/06/2024           Current Outpatient Medications:     allopurinoL (ZYLOPRIM) 100 MG tablet, Take 1 tablet (100 mg total) by mouth once daily., Disp: 90 tablet, Rfl: 1    amLODIPine (NORVASC) 5 MG tablet, Take 1 tablet (5 mg total) by mouth daily as needed (If blood pressure is over 120/80)., Disp: 90 tablet, Rfl: 1    aspirin (ECOTRIN) 81 MG EC tablet, Take 81 mg by mouth once daily., Disp: , Rfl:     atorvastatin (LIPITOR) 80 MG tablet, Take 1 tablet (80 mg total) by mouth once daily., Disp: 90 tablet, Rfl: 1    benazepriL (LOTENSIN) 40 MG tablet, Take 1 tablet (40 mg total) by mouth every evening., Disp: 90 tablet, Rfl: 1    buPROPion (WELLBUTRIN XL) 300 MG 24 hr tablet, Take 1 tablet (300 mg total) by mouth once daily., Disp: 90 tablet, Rfl: 1    carvediloL (COREG) 25 MG tablet, Take 1 tablet (25 mg total) by mouth 2 (two) times daily., Disp: 180 tablet, Rfl: 1    diphenhydrAMINE (SOMINEX) 25 mg tablet, Take 25 mg by mouth nightly as needed for Insomnia., Disp: , Rfl:     ergocalciferol (ERGOCALCIFEROL) 50,000 unit Cap, Take 1 capsule (50,000 Units total) by mouth every 7 days., Disp: 12 capsule, Rfl: 1    FEROSUL 325 mg (65 mg iron) Tab tablet, TAKE 1 TABLET ONE TIME DAILY, Disp: 90 tablet, Rfl: 1    levothyroxine (SYNTHROID) 50 MCG tablet, Take 1 tablet (50 mcg total) by mouth before breakfast., Disp: 90 tablet, Rfl: 1    nitroGLYCERIN (NITROSTAT) 0.4 MG SL tablet, Place 1 tablet (0.4 mg total) under the tongue every 5 (five) minutes as needed for Chest pain., Disp: 30 tablet,  "Rfl: 0    potassium chloride (MICRO-K) 10 MEQ CpSR, Take 1 capsule (10 mEq total) by mouth once daily., Disp: 90 capsule, Rfl: 1    spironolactone (ALDACTONE) 25 MG tablet, Take 1 tablet (25 mg total) by mouth once daily., Disp: 90 tablet, Rfl: 1    docusate sodium (COLACE) 100 MG capsule, Take 100 mg by mouth 2 (two) times daily. (Patient not taking: Reported on 3/20/2025), Disp: , Rfl:     hydrOXYzine HCL (ATARAX) 25 MG tablet, TAKE 1 TABLET (25 MG TOTAL) BY MOUTH EVERY EVENING. (Patient not taking: Reported on 1/6/2025), Disp: 90 tablet, Rfl: 1    naproxen (NAPROSYN) 500 MG tablet, Take 1 tablet twice daily as needed for headache, not more than 3 days of the week (Patient not taking: Reported on 3/20/2025), Disp: 20 tablet, Rfl: 1    tiZANidine (ZANAFLEX) 4 MG tablet, Take 4 mg by mouth every 6 (six) hours as needed. (Patient not taking: Reported on 3/20/2025), Disp: , Rfl:   Meds reviewed and reconciled.      Review of Systems   Respiratory:  Positive for shortness of breath.         Chronic stable CARNES   Cardiovascular:  Positive for chest pain and palpitations. Negative for orthopnea, claudication, leg swelling and PND.   Neurological:  Negative for dizziness, loss of consciousness and weakness.           Objective:   BP (!) 140/78   Pulse (!) 56   Ht 5' 4" (1.626 m)   Wt 82.6 kg (182 lb)   LMP  (LMP Unknown)   SpO2 98%   BMI 31.24 kg/m²     Physical Exam  Vitals and nursing note reviewed.   Constitutional:       Appearance: Normal appearance. She is normal weight.   HENT:      Head: Normocephalic and atraumatic.   Neck:      Vascular: No carotid bruit.   Cardiovascular:      Rate and Rhythm: Normal rate and regular rhythm.      Pulses: Normal pulses.      Heart sounds: Normal heart sounds.   Pulmonary:      Effort: Pulmonary effort is normal.      Breath sounds: Normal breath sounds.   Abdominal:      Palpations: Abdomen is soft.   Musculoskeletal:      Cervical back: Neck supple.      Right lower leg: " "No edema.      Left lower leg: No edema.   Skin:     General: Skin is warm and dry.      Capillary Refill: Capillary refill takes less than 2 seconds.   Neurological:      General: No focal deficit present.      Mental Status: She is alert.           Assessment:     1. Coronary artery disease, unspecified vessel or lesion type, unspecified whether angina present, unspecified whether native or transplanted heart  EKG 12-lead    Nuclear Stress Test      2. Chest pain, unspecified type  NM Myocardial Perfusion Spect Multi Pharmacologic    Nuclear Stress Test      3. Mild tricuspid regurgitation  Echo      4. Hyperlipidemia, unspecified hyperlipidemia type  Lipid Panel      5. High risk medication use  ALT (SGPT)      6. Hypothyroidism, unspecified type  TSH      7. Coronary artery disease involving native coronary artery of native heart with angina pectoris        8. Essential hypertension        9. Atypical chest pain              Plan:   Coronary artery disease involving native coronary artery of native heart  S/p CABG 8/31/2017  +chest pain (see chest pain diagnosis below)  Continue ASA/Lipitor    Essential hypertension  140/78 mmHg    Hyperlipidemia  Lab Results   Component Value Date    CHOL 160 01/18/2024    CHOL 144 07/17/2023    CHOL 153 01/17/2023     Lab Results   Component Value Date    HDL 62 (H) 01/18/2024    HDL 59 07/17/2023    HDL 56 01/17/2023     Lab Results   Component Value Date    LDLCALC 86 01/18/2024    LDLCALC 73 07/17/2023    LDLCALC 80 01/17/2023     Lab Results   Component Value Date    TRIG 61 01/18/2024    TRIG 59 07/17/2023    TRIG 87 01/17/2023       Lab Results   Component Value Date    CHOLHDL 2.6 01/18/2024    CHOLHDL 2.4 07/17/2023    CHOLHDL 2.7 01/17/2023     Lipitor 80 mg po daily  Lipids followed by PCP.    Atypical chest pain  Patient reports intermittent episodes substernal chest pressure; occurring once a week for the last month. This is associated with "heart pounding" and the " "sensation that she "can't take a deep breath". This occurs both at rest (lying on her side and at times relieves with changing position) and at times after physical exertion. If changing position does not resolve the discomfort, she will take an amlodipine and the discomfort resolves in 20-30 minutes. She has not used her sublingual ntg. She has noticed that she is more likely to have an issue if she forgets to take her amlodipine. These symptoms are similar to the issues she was having prior to her CABG. She has not had any similar issues until approx one month to six weeks ago.     Schedule Lexiscan/echo  Lipids and TSH  (same days as Ayah and echo)  Patient can not walk for EST due to right hip and ankle issues.    RTC: 6 months        "

## 2025-03-20 NOTE — ASSESSMENT & PLAN NOTE
Lab Results   Component Value Date    CHOL 160 01/18/2024    CHOL 144 07/17/2023    CHOL 153 01/17/2023     Lab Results   Component Value Date    HDL 62 (H) 01/18/2024    HDL 59 07/17/2023    HDL 56 01/17/2023     Lab Results   Component Value Date    LDLCALC 86 01/18/2024    LDLCALC 73 07/17/2023    LDLCALC 80 01/17/2023     Lab Results   Component Value Date    TRIG 61 01/18/2024    TRIG 59 07/17/2023    TRIG 87 01/17/2023       Lab Results   Component Value Date    CHOLHDL 2.6 01/18/2024    CHOLHDL 2.4 07/17/2023    CHOLHDL 2.7 01/17/2023     Lipitor 80 mg po daily  Lipids followed by PCP.

## 2025-03-20 NOTE — ASSESSMENT & PLAN NOTE
"Patient reports intermittent episodes substernal chest pressure; occurring once a week for the last month. This is associated with "heart pounding" and the sensation that she "can't take a deep breath". This occurs both at rest (lying on her side and at times relieves with changing position) and at times after physical exertion. If changing position does not resolve the discomfort, she will take an amlodipine and the discomfort resolves in 20-30 minutes. She has not used her sublingual ntg. She has noticed that she is more likely to have an issue if she forgets to take her amlodipine. These symptoms are similar to the issues she was having prior to her CABG. She has not had any similar issues until approx one month to six weeks ago.     Schedule Lexiscan/echo  Lipids and TSH  (same days as Ayah and echo)  Patient can not walk for EST due to right hip and ankle issues.  "

## 2025-03-21 LAB
OHS QRS DURATION: 84 MS
OHS QTC CALCULATION: 394 MS

## 2025-03-23 DIAGNOSIS — G89.29 CHRONIC PAIN OF LEFT KNEE: ICD-10-CM

## 2025-03-23 DIAGNOSIS — I10 ESSENTIAL HYPERTENSION: ICD-10-CM

## 2025-03-23 DIAGNOSIS — M25.562 CHRONIC PAIN OF LEFT KNEE: ICD-10-CM

## 2025-03-23 DIAGNOSIS — E03.9 HYPOTHYROIDISM, UNSPECIFIED TYPE: ICD-10-CM

## 2025-03-23 DIAGNOSIS — M10.9 GOUT, UNSPECIFIED CAUSE, UNSPECIFIED CHRONICITY, UNSPECIFIED SITE: ICD-10-CM

## 2025-03-23 DIAGNOSIS — E78.5 HYPERLIPIDEMIA, UNSPECIFIED HYPERLIPIDEMIA TYPE: ICD-10-CM

## 2025-03-23 DIAGNOSIS — E55.9 VITAMIN D DEFICIENCY: ICD-10-CM

## 2025-03-24 RX ORDER — CARVEDILOL 25 MG/1
25 TABLET ORAL 2 TIMES DAILY
Qty: 200 TABLET | Refills: 2 | Status: SHIPPED | OUTPATIENT
Start: 2025-03-24

## 2025-03-24 RX ORDER — DICLOFENAC SODIUM 75 MG/1
75 TABLET, DELAYED RELEASE ORAL 2 TIMES DAILY
Qty: 200 TABLET | Refills: 2 | Status: SHIPPED | OUTPATIENT
Start: 2025-03-24

## 2025-03-24 RX ORDER — ATORVASTATIN CALCIUM 80 MG/1
80 TABLET, FILM COATED ORAL
Qty: 100 TABLET | Refills: 2 | Status: SHIPPED | OUTPATIENT
Start: 2025-03-24

## 2025-03-24 RX ORDER — ERGOCALCIFEROL 1.25 MG/1
50000 CAPSULE ORAL
Qty: 15 CAPSULE | Refills: 2 | Status: SHIPPED | OUTPATIENT
Start: 2025-03-24

## 2025-03-24 RX ORDER — ALLOPURINOL 100 MG/1
100 TABLET ORAL
Qty: 100 TABLET | Refills: 2 | Status: SHIPPED | OUTPATIENT
Start: 2025-03-24

## 2025-03-24 RX ORDER — POTASSIUM CHLORIDE 750 MG/1
10 CAPSULE, EXTENDED RELEASE ORAL
Qty: 100 CAPSULE | Refills: 2 | Status: SHIPPED | OUTPATIENT
Start: 2025-03-24

## 2025-03-24 RX ORDER — BENAZEPRIL HYDROCHLORIDE 40 MG/1
40 TABLET ORAL NIGHTLY
Qty: 100 TABLET | Refills: 2 | Status: SHIPPED | OUTPATIENT
Start: 2025-03-24

## 2025-03-24 RX ORDER — LEVOTHYROXINE SODIUM 50 UG/1
50 TABLET ORAL
Qty: 100 TABLET | Refills: 2 | Status: SHIPPED | OUTPATIENT
Start: 2025-03-24

## 2025-03-24 RX ORDER — AMLODIPINE BESYLATE 5 MG/1
TABLET ORAL
Qty: 100 TABLET | Refills: 2 | Status: SHIPPED | OUTPATIENT
Start: 2025-03-24

## 2025-03-24 RX ORDER — SPIRONOLACTONE 25 MG/1
25 TABLET ORAL
Qty: 100 TABLET | Refills: 2 | Status: SHIPPED | OUTPATIENT
Start: 2025-03-24

## 2025-03-24 NOTE — TELEPHONE ENCOUNTER
Please see the attached refill request. Pharmacy is requesting Rxs for 100 day supplies on the included long term medications. Diclofenac had been D/C by Neurology to be replaced with Naproxen, however, according to patient message encounter with Neurology on 02/21/2025 the Pt was changed back to diclofenac.

## 2025-04-08 ENCOUNTER — TELEPHONE (OUTPATIENT)
Dept: CARDIOLOGY | Facility: HOSPITAL | Age: 65
End: 2025-04-08
Payer: MEDICARE

## 2025-04-09 ENCOUNTER — HOSPITAL ENCOUNTER (OUTPATIENT)
Dept: CARDIOLOGY | Facility: HOSPITAL | Age: 65
Discharge: HOME | End: 2025-04-09
Attending: NURSE PRACTITIONER
Payer: MEDICARE

## 2025-04-09 ENCOUNTER — HOSPITAL ENCOUNTER (OUTPATIENT)
Dept: RADIOLOGY | Facility: HOSPITAL | Age: 65
Discharge: HOME | End: 2025-04-09
Attending: NURSE PRACTITIONER
Payer: MEDICARE

## 2025-04-09 VITALS — DIASTOLIC BLOOD PRESSURE: 68 MMHG | SYSTOLIC BLOOD PRESSURE: 150 MMHG | HEART RATE: 64 BPM

## 2025-04-09 DIAGNOSIS — I07.1 MILD TRICUSPID REGURGITATION: ICD-10-CM

## 2025-04-09 DIAGNOSIS — R07.9 CHEST PAIN, UNSPECIFIED TYPE: ICD-10-CM

## 2025-04-09 DIAGNOSIS — I25.10 CORONARY ARTERY DISEASE, UNSPECIFIED VESSEL OR LESION TYPE, UNSPECIFIED WHETHER ANGINA PRESENT, UNSPECIFIED WHETHER NATIVE OR TRANSPLANTED HEART: ICD-10-CM

## 2025-04-09 LAB
AORTIC ROOT ANNULUS: 2.89 CM
AORTIC VALVE CUSP SEPERATION: 1.59 CM
ASCENDING AORTA: 2.77 CM
AV INDEX (PROSTH): 0.76
AV MEAN GRADIENT: 5 MMHG
AV PEAK GRADIENT: 10 MMHG
AV VALVE AREA BY VELOCITY RATIO: 2.4 CM²
AV VALVE AREA: 2.4 CM²
AV VELOCITY RATIO: 0.75
CV ECHO LV RWT: 0.25 CM
DOP CALC AO PEAK VEL: 1.6 M/S
DOP CALC AO VTI: 40.2 CM
DOP CALC LVOT AREA: 3.1 CM2
DOP CALC LVOT DIAMETER: 2 CM
DOP CALC LVOT PEAK VEL: 1.2 M/S
DOP CALC LVOT STROKE VOLUME: 95.5 CM3
DOP CALC MV VTI: 39.1 CM
DOP CALC RVOT PEAK VEL: 0.65 M/S
DOP CALC RVOT VTI: 17.6 CM
DOP CALCLVOT PEAK VEL VTI: 30.4 CM
E WAVE DECELERATION TIME: 184 MSEC
E/A RATIO: 1.4
E/E' RATIO: 9 M/S
ECHO LV POSTERIOR WALL: 0.7 CM (ref 0.6–1.1)
FRACTIONAL SHORTENING: 54.4 % (ref 28–44)
INTERVENTRICULAR SEPTUM: 1 CM (ref 0.6–1.1)
IVC DIAMETER: 1.29 CM
IVRT: 72 MSEC
LEFT ATRIUM AREA SYSTOLIC (APICAL 2 CHAMBER): 23.93 CM2
LEFT ATRIUM AREA SYSTOLIC (APICAL 4 CHAMBER): 20.42 CM2
LEFT ATRIUM SIZE: 4.3 CM
LEFT ATRIUM VOLUME MOD: 73 ML
LEFT INTERNAL DIMENSION IN SYSTOLE: 2.6 CM (ref 2.1–4)
LEFT VENTRICLE DIASTOLIC VOLUME: 158 ML
LEFT VENTRICLE END SYSTOLIC VOLUME APICAL 2 CHAMBER: 79 ML
LEFT VENTRICLE END SYSTOLIC VOLUME APICAL 4 CHAMBER: 61.01 ML
LEFT VENTRICLE SYSTOLIC VOLUME: 24 ML
LEFT VENTRICULAR INTERNAL DIMENSION IN DIASTOLE: 5.7 CM (ref 3.5–6)
LEFT VENTRICULAR MASS: 183.7 G
LV LATERAL E/E' RATIO: 9.1 M/S
LV SEPTAL E/E' RATIO: 8.3 M/S
LVED V (TEICH): 157.56 ML
LVES V (TEICH): 24.14 ML
LVOT MG: 3.2 MMHG
LVOT MV: 0.86 CM/S
MV MEAN GRADIENT: 2 MMHG
MV PEAK A VEL: 0.65 M/S
MV PEAK E VEL: 0.91 M/S
MV PEAK GRADIENT: 6 MMHG
MV STENOSIS PRESSURE HALF TIME: 53.41 MS
MV VALVE AREA BY CONTINUITY EQUATION: 2.44 CM2
MV VALVE AREA P 1/2 METHOD: 4.12 CM2
OHS CV RV/LV RATIO: 0.56 CM
PISA MRMAX VEL: 5.62 M/S
PISA TR MAX VEL: 3.2 M/S
PV MEAN GRADIENT: 1 MMHG
PV MV: 0.76 M/S
PV PEAK GRADIENT: 2 MMHG
PV PEAK VELOCITY: 1.05 M/S
RA VOL SYS: 26.82 ML
RIGHT ATRIAL AREA: 12 CM2
RIGHT ATRIUM VOLUME AREA LENGTH APICAL 4 CHAMBER: 25.39 ML
RIGHT VENTRICLE DIASTOLIC BASEL DIMENSION: 3.2 CM
RIGHT VENTRICULAR END-DIASTOLIC DIMENSION: 3.2 CM
STJ: 2.56 CM
TDI LATERAL: 0.1 M/S
TDI SEPTAL: 0.11 M/S
TDI: 0.11 M/S
TR MAX PG: 40 MMHG
TRICUSPID ANNULAR PLANE SYSTOLIC EXCURSION: 2.04 CM

## 2025-04-09 PROCEDURE — 93016 CV STRESS TEST SUPVJ ONLY: CPT | Mod: ,,, | Performed by: INTERNAL MEDICINE

## 2025-04-09 PROCEDURE — 93018 CV STRESS TEST I&R ONLY: CPT | Mod: ,,, | Performed by: INTERNAL MEDICINE

## 2025-04-09 PROCEDURE — 78452 HT MUSCLE IMAGE SPECT MULT: CPT | Mod: 26,,, | Performed by: INTERNAL MEDICINE

## 2025-04-09 PROCEDURE — 93017 CV STRESS TEST TRACING ONLY: CPT

## 2025-04-09 PROCEDURE — 93306 TTE W/DOPPLER COMPLETE: CPT

## 2025-04-09 PROCEDURE — 78452 HT MUSCLE IMAGE SPECT MULT: CPT | Mod: TC

## 2025-04-09 PROCEDURE — 63600175 PHARM REV CODE 636 W HCPCS: Performed by: NURSE PRACTITIONER

## 2025-04-09 PROCEDURE — A9500 TC99M SESTAMIBI: HCPCS | Performed by: NURSE PRACTITIONER

## 2025-04-09 RX ORDER — TETRAKIS(2-METHOXYISOBUTYLISOCYANIDE)COPPER(I) TETRAFLUOROBORATE 1 MG/ML
10.9 INJECTION, POWDER, LYOPHILIZED, FOR SOLUTION INTRAVENOUS
Status: COMPLETED | OUTPATIENT
Start: 2025-04-09 | End: 2025-04-09

## 2025-04-09 RX ORDER — REGADENOSON 0.08 MG/ML
0.4 INJECTION, SOLUTION INTRAVENOUS ONCE
Status: COMPLETED | OUTPATIENT
Start: 2025-04-09 | End: 2025-04-09

## 2025-04-09 RX ORDER — TETRAKIS(2-METHOXYISOBUTYLISOCYANIDE)COPPER(I) TETRAFLUOROBORATE 1 MG/ML
35 INJECTION, POWDER, LYOPHILIZED, FOR SOLUTION INTRAVENOUS
Status: COMPLETED | OUTPATIENT
Start: 2025-04-09 | End: 2025-04-09

## 2025-04-09 RX ADMIN — KIT FOR THE PREPARATION OF TECHNETIUM TC99M SESTAMIBI 10.9 MILLICURIE: 1 INJECTION, POWDER, LYOPHILIZED, FOR SOLUTION PARENTERAL at 08:04

## 2025-04-09 RX ADMIN — REGADENOSON 0.4 MG: 0.08 INJECTION, SOLUTION INTRAVENOUS at 09:04

## 2025-04-09 RX ADMIN — KIT FOR THE PREPARATION OF TECHNETIUM TC99M SESTAMIBI 35 MILLICURIE: 1 INJECTION, POWDER, LYOPHILIZED, FOR SOLUTION PARENTERAL at 09:04

## 2025-04-10 ENCOUNTER — PATIENT MESSAGE (OUTPATIENT)
Dept: CARDIOLOGY | Facility: CLINIC | Age: 65
End: 2025-04-10
Payer: MEDICARE

## 2025-04-10 ENCOUNTER — TELEPHONE (OUTPATIENT)
Dept: CARDIOLOGY | Facility: CLINIC | Age: 65
End: 2025-04-10
Payer: MEDICARE

## 2025-04-10 NOTE — TELEPHONE ENCOUNTER
----- Message from Latisha sent at 4/10/2025  2:11 PM CDT -----  Regarding: Call back  Who Called: Becki Manuel is requesting assistance/information from provider's office.Patient would like to know if she have her blood work completed at [facility] in [place]. Preferred Method of Contact: Phone CallPatient's Preferred Phone Number on File: [phone] Best Call Back Number, if different:Additional Information:

## 2025-04-11 ENCOUNTER — RESULTS FOLLOW-UP (OUTPATIENT)
Dept: CARDIOLOGY | Facility: CLINIC | Age: 65
End: 2025-04-11

## 2025-04-11 LAB
CV STRESS BASE HR: 64 BPM
DIASTOLIC BLOOD PRESSURE: 68 MMHG
OHS CV CPX 1 MINUTE RECOVERY HEART RATE: 84 BPM
OHS CV CPX 85 PERCENT MAX PREDICTED HEART RATE MALE: 132
OHS CV CPX MAX PREDICTED HEART RATE: 155
OHS CV CPX PATIENT IS FEMALE: 1
OHS CV CPX PATIENT IS MALE: 0
OHS CV CPX PEAK DIASTOLIC BLOOD PRESSURE: 69 MMHG
OHS CV CPX PEAK HEAR RATE: 99 BPM
OHS CV CPX PEAK RATE PRESSURE PRODUCT: NORMAL
OHS CV CPX PEAK SYSTOLIC BLOOD PRESSURE: 165 MMHG
OHS CV CPX PERCENT MAX PREDICTED HEART RATE ACHIEVED: 67
OHS CV CPX RATE PRESSURE PRODUCT PRESENTING: 9600
SYSTOLIC BLOOD PRESSURE: 150 MMHG

## 2025-04-14 ENCOUNTER — LAB VISIT (OUTPATIENT)
Dept: LAB | Facility: HOSPITAL | Age: 65
End: 2025-04-14
Attending: NURSE PRACTITIONER
Payer: MEDICARE

## 2025-04-14 DIAGNOSIS — Z79.899 HIGH RISK MEDICATION USE: ICD-10-CM

## 2025-04-14 DIAGNOSIS — E78.5 HYPERLIPIDEMIA, UNSPECIFIED HYPERLIPIDEMIA TYPE: ICD-10-CM

## 2025-04-14 DIAGNOSIS — E03.9 HYPOTHYROIDISM, UNSPECIFIED TYPE: ICD-10-CM

## 2025-04-14 LAB
ALT SERPL W P-5'-P-CCNC: 25 U/L
CHOLEST SERPL-MCNC: 169 MG/DL
CHOLEST/HDLC SERPL: 3 {RATIO}
HDLC SERPL-MCNC: 57 MG/DL (ref 35–60)
LDLC SERPL CALC-MCNC: 98 MG/DL
LDLC/HDLC SERPL: 1.7 {RATIO}
NONHDLC SERPL-MCNC: 112 MG/DL
TRIGL SERPL-MCNC: 71 MG/DL (ref 37–140)
TSH SERPL DL<=0.005 MIU/L-ACNC: 1.04 UIU/ML (ref 0.35–4.94)
VLDLC SERPL-MCNC: 14 MG/DL

## 2025-04-14 PROCEDURE — 84460 ALANINE AMINO (ALT) (SGPT): CPT

## 2025-04-14 PROCEDURE — 36415 COLL VENOUS BLD VENIPUNCTURE: CPT

## 2025-04-14 PROCEDURE — 80061 LIPID PANEL: CPT

## 2025-04-14 PROCEDURE — 84443 ASSAY THYROID STIM HORMONE: CPT

## 2025-04-15 ENCOUNTER — PATIENT MESSAGE (OUTPATIENT)
Dept: ADMINISTRATIVE | Facility: OTHER | Age: 65
End: 2025-04-15
Payer: MEDICARE

## 2025-04-17 DIAGNOSIS — Z79.899 OTHER LONG TERM (CURRENT) DRUG THERAPY: ICD-10-CM

## 2025-04-17 DIAGNOSIS — E78.5 HYPERLIPIDEMIA, UNSPECIFIED HYPERLIPIDEMIA TYPE: Primary | ICD-10-CM

## 2025-04-17 RX ORDER — EZETIMIBE 10 MG/1
10 TABLET ORAL DAILY
Qty: 30 TABLET | Refills: 1 | Status: SHIPPED | OUTPATIENT
Start: 2025-04-17 | End: 2026-04-17

## 2025-04-17 NOTE — TELEPHONE ENCOUNTER
----- Message from ADRIANNA García sent at 4/15/2025  4:10 PM CDT -----  LDL is not to goal of <70 mg/dl  Add Zetia 10 mg po daily  Repeat lipids in 2 months  ----- Message -----  From: Lab, Background User  Sent: 4/14/2025   9:12 AM CDT  To: ADRIANNA Figueroa

## 2025-04-17 NOTE — PROGRESS NOTES
Pt asked what is causing her CP at night. Also, her BP is still running 140-160s/60-70s. She is unsure about her HR. She is taking amlodipine 5mg BID.

## 2025-04-22 ENCOUNTER — PATIENT MESSAGE (OUTPATIENT)
Dept: ADMINISTRATIVE | Facility: OTHER | Age: 65
End: 2025-04-22
Payer: MEDICARE

## 2025-04-28 ENCOUNTER — OFFICE VISIT (OUTPATIENT)
Dept: FAMILY MEDICINE | Facility: CLINIC | Age: 65
End: 2025-04-28
Payer: MEDICARE

## 2025-04-28 ENCOUNTER — APPOINTMENT (OUTPATIENT)
Dept: RADIOLOGY | Facility: CLINIC | Age: 65
End: 2025-04-28
Payer: MEDICARE

## 2025-04-28 ENCOUNTER — RESULTS FOLLOW-UP (OUTPATIENT)
Dept: FAMILY MEDICINE | Facility: CLINIC | Age: 65
End: 2025-04-28

## 2025-04-28 VITALS
HEART RATE: 67 BPM | SYSTOLIC BLOOD PRESSURE: 132 MMHG | TEMPERATURE: 98 F | RESPIRATION RATE: 20 BRPM | DIASTOLIC BLOOD PRESSURE: 76 MMHG | WEIGHT: 179.81 LBS | HEIGHT: 64 IN | OXYGEN SATURATION: 97 % | BODY MASS INDEX: 30.7 KG/M2

## 2025-04-28 DIAGNOSIS — M25.561 ACUTE PAIN OF RIGHT KNEE: Primary | ICD-10-CM

## 2025-04-28 DIAGNOSIS — M25.561 ACUTE PAIN OF RIGHT KNEE: ICD-10-CM

## 2025-04-28 PROBLEM — Z79.899 HIGH RISK MEDICATION USE: Status: RESOLVED | Noted: 2021-09-16 | Resolved: 2025-04-28

## 2025-04-28 PROBLEM — R79.9 ABNORMAL FINDING OF BLOOD CHEMISTRY, UNSPECIFIED: Status: RESOLVED | Noted: 2024-02-02 | Resolved: 2025-04-28

## 2025-04-28 PROBLEM — M79.89 PAIN AND SWELLING OF LEFT LOWER LEG: Status: RESOLVED | Noted: 2024-04-29 | Resolved: 2025-04-28

## 2025-04-28 PROBLEM — J01.00 ACUTE MAXILLARY SINUSITIS: Status: RESOLVED | Noted: 2025-01-08 | Resolved: 2025-04-28

## 2025-04-28 PROBLEM — Z12.11 COLON CANCER SCREENING: Status: RESOLVED | Noted: 2023-01-19 | Resolved: 2025-04-28

## 2025-04-28 PROBLEM — M79.662 PAIN AND SWELLING OF LEFT LOWER LEG: Status: RESOLVED | Noted: 2024-04-29 | Resolved: 2025-04-28

## 2025-04-28 PROBLEM — U07.1 COVID: Status: RESOLVED | Noted: 2023-11-29 | Resolved: 2025-04-28

## 2025-04-28 PROBLEM — Z00.00 ENCOUNTER FOR PREVENTIVE HEALTH EXAMINATION: Status: RESOLVED | Noted: 2024-08-30 | Resolved: 2025-04-28

## 2025-04-28 PROBLEM — R79.9 ABNORMAL BLOOD FINDING: Status: RESOLVED | Noted: 2022-06-13 | Resolved: 2025-04-28

## 2025-04-28 PROBLEM — M54.50 LUMBAR PAIN: Status: RESOLVED | Noted: 2022-04-27 | Resolved: 2025-04-28

## 2025-04-28 PROBLEM — Z79.899 OTHER LONG TERM (CURRENT) DRUG THERAPY: Status: RESOLVED | Noted: 2022-06-13 | Resolved: 2025-04-28

## 2025-04-28 PROBLEM — M54.9 DORSALGIA, UNSPECIFIED: Status: RESOLVED | Noted: 2022-05-02 | Resolved: 2025-04-28

## 2025-04-28 PROBLEM — Z00.00 ENCOUNTER FOR SUBSEQUENT ANNUAL WELLNESS VISIT (AWV) IN MEDICARE PATIENT: Status: RESOLVED | Noted: 2024-08-30 | Resolved: 2025-04-28

## 2025-04-28 PROBLEM — M54.9 MID BACK PAIN: Status: RESOLVED | Noted: 2022-05-25 | Resolved: 2025-04-28

## 2025-04-28 PROCEDURE — 1125F AMNT PAIN NOTED PAIN PRSNT: CPT | Mod: ,,,

## 2025-04-28 PROCEDURE — 3078F DIAST BP <80 MM HG: CPT | Mod: ,,,

## 2025-04-28 PROCEDURE — 1100F PTFALLS ASSESS-DOCD GE2>/YR: CPT | Mod: ,,,

## 2025-04-28 PROCEDURE — 99213 OFFICE O/P EST LOW 20 MIN: CPT | Mod: 25,,,

## 2025-04-28 PROCEDURE — 1159F MED LIST DOCD IN RCRD: CPT | Mod: ,,,

## 2025-04-28 PROCEDURE — 1160F RVW MEDS BY RX/DR IN RCRD: CPT | Mod: ,,,

## 2025-04-28 PROCEDURE — 4010F ACE/ARB THERAPY RXD/TAKEN: CPT | Mod: ,,,

## 2025-04-28 PROCEDURE — 3075F SYST BP GE 130 - 139MM HG: CPT | Mod: ,,,

## 2025-04-28 PROCEDURE — 96372 THER/PROPH/DIAG INJ SC/IM: CPT | Mod: ,,,

## 2025-04-28 PROCEDURE — 3288F FALL RISK ASSESSMENT DOCD: CPT | Mod: ,,,

## 2025-04-28 PROCEDURE — 3008F BODY MASS INDEX DOCD: CPT | Mod: ,,,

## 2025-04-28 PROCEDURE — 73560 X-RAY EXAM OF KNEE 1 OR 2: CPT | Mod: TC,RHCUB,FY,RT

## 2025-04-28 RX ORDER — KETOROLAC TROMETHAMINE 30 MG/ML
30 INJECTION, SOLUTION INTRAMUSCULAR; INTRAVENOUS
Status: COMPLETED | OUTPATIENT
Start: 2025-04-28 | End: 2025-04-28

## 2025-04-28 RX ADMIN — KETOROLAC TROMETHAMINE 30 MG: 30 INJECTION, SOLUTION INTRAMUSCULAR; INTRAVENOUS at 11:04

## 2025-04-28 NOTE — PROGRESS NOTES
EDGAR DIALLO, ASHA   [facility]  [address]  [place]  [address]        PATIENT NAME: Becki Mejia  : 1960  DATE: 25  MRN: [id number]        Reason for Visit / Chief Complaint: Knee Injury (Patient is a 64 year old female who presents to the clinic related to right knee pain since last Wednesday.  Patient fell at [facility], was carrying grand baby. Hit right knee on concrete walkway.  Patient reports not able to bend knee or get on knees to make bed. Has been favoring the left knee due to another recent fall, can't do that now due to pain in right knee. ) and Medication Problem (Patient reports she has started Zetia on Saturday, seeing JT Kirk/Cardiology, taking Zetia in the morning, has noticed that her legs/arm muscles are achy since she started taking, can't get comfortable, hot flashes.)       Update PCP  Update Chief Complaint         History of Present Illness / Problem Focused Workflow     Becki Mejia presents to the clinic with Knee Injury (Patient is a 64 year old female who presents to the clinic related to right knee pain since last Wednesday.  Patient fell at [facility], was carrying grand baby. Hit right knee on concrete walkway.  Patient reports not able to bend knee or get on knees to make bed. Has been favoring the left knee due to another recent fall, can't do that now due to pain in right knee. ) and Medication Problem (Patient reports she has started Zetia on Saturday, seeing JT Kirk/Cardiology, taking Zetia in the morning, has noticed that her legs/arm muscles are achy since she started taking, can't get comfortable, hot flashes.)     Knee Injury  This is a new problem. The current episode started in the past 7 days. The problem occurs intermittently. The problem has been unchanged. Pertinent negatives include no abdominal pain, chills, congestion, coughing, fatigue, fever, headaches, myalgias, nausea, sore throat,  vomiting or weakness. The symptoms are aggravated by bending and twisting. She has tried acetaminophen and NSAIDs for the symptoms. The treatment provided mild relief.       Review of Systems     Review of Systems   Constitutional:  Negative for chills, fatigue and fever.   HENT:  Negative for nasal congestion, ear discharge, ear pain, rhinorrhea, sinus pressure/congestion and sore throat.    Respiratory:  Negative for cough, chest tightness, shortness of breath, wheezing and stridor.    Cardiovascular:  Negative for palpitations and claudication.   Gastrointestinal:  Negative for abdominal pain, constipation, diarrhea, nausea, vomiting and reflux.   Genitourinary:  Negative for dysuria, flank pain, frequency, hematuria and urgency.   Musculoskeletal:  Negative for myalgias.        Right knee pain   Neurological:  Negative for dizziness, weakness, light-headedness and headaches.   Psychiatric/Behavioral:  Negative for suicidal ideas.         Medical / Social / Family History     Past Medical History:   Diagnosis Date    Coronary artery disease     Depression     Diverticulosis     Fibromyalgia     Fibromyalgia     Heart disease     Hyperlipidemia     Hypertension     Hypothyroidism     Mass of adrenal gland     Osteopenia     Ventral hernia        Past Surgical History:   Procedure Laterality Date    ADENOIDECTOMY      ANKLE FRACTURE SURGERY Right     shattered ankle    APPENDECTOMY      CARDIAC SURGERY      Open Heart Surgery    CORONARY ARTERY BYPASS GRAFT      HIP FRACTURE SURGERY Right     HYSTERECTOMY      TOTAL    KNEE SURGERY Right     meniscus tear    OOPHORECTOMY      SPINE SURGERY      L4 and L5    TIBIA FRACTURE SURGERY Right     TONSILLECTOMY         Social History  Ms.  reports that she quit smoking about 18 years ago. Her smoking use included cigarettes. She started smoking about 37 years ago. She has a 19 pack-year smoking history. She has been exposed to tobacco smoke. She has never used smokeless  tobacco. She reports current alcohol use. She reports that she does not use drugs.    Family History  Ms.'s family history includes Breast cancer in her paternal great-grandmother; Colon cancer in her sister; Diabetes in her father, mother, paternal grandmother, and sister; Hepatitis in her son; Hypertension in her father, mother, and son; Hypoglycemic in her sister; Liver cancer in her sister; No Known Problems in her brother and daughter; Parkinsonism in her father; Post-traumatic stress disorder in her son; Uterine cancer in her mother.    Medications and Allergies     Medications  Outpatient Medications Marked as Taking for the 4/28/25 encounter (Office Visit) with Russell Judd FNP   Medication Sig Dispense Refill    allopurinoL (ZYLOPRIM) 100 MG tablet TAKE 1 TABLET BY MOUTH ONCE  DAILY 100 tablet 2    amLODIPine (NORVASC) 5 MG tablet TAKE 1 TABLET BY MOUTH DAILY AS  NEEDED IF BLOOD PRESSURE IS OVER 120/80 (Patient taking differently: Take 5 mg by mouth 2 (two) times daily.) 100 tablet 2    aspirin (ECOTRIN) 81 MG EC tablet Take 81 mg by mouth once daily.      atorvastatin (LIPITOR) 80 MG tablet TAKE 1 TABLET BY MOUTH ONCE  DAILY 100 tablet 2    benazepriL (LOTENSIN) 40 MG tablet TAKE 1 TABLET BY MOUTH IN THE  EVENING 100 tablet 2    buPROPion (WELLBUTRIN XL) 300 MG 24 hr tablet Take 1 tablet (300 mg total) by mouth once daily. 90 tablet 1    carvediloL (COREG) 25 MG tablet TAKE 1 TABLET BY MOUTH TWICE  DAILY 200 tablet 2    diclofenac (VOLTAREN) 75 MG EC tablet TAKE 1 TABLET BY MOUTH TWICE  DAILY 200 tablet 2    diphenhydrAMINE (SOMINEX) 25 mg tablet Take 25 mg by mouth nightly as needed for Insomnia.      ezetimibe (ZETIA) 10 mg tablet Take 1 tablet (10 mg total) by mouth once daily. 30 tablet 1    FEROSUL 325 mg (65 mg iron) Tab tablet TAKE 1 TABLET ONE TIME DAILY 90 tablet 1    levothyroxine (SYNTHROID) 50 MCG tablet TAKE 1 TABLET BY MOUTH BEFORE  BREAKFAST 100 tablet 2    naproxen (NAPROSYN) 500 MG  "tablet Take 1 tablet twice daily as needed for headache, not more than 3 days of the week 20 tablet 1    nitroGLYCERIN (NITROSTAT) 0.4 MG SL tablet Place 1 tablet (0.4 mg total) under the tongue every 5 (five) minutes as needed for Chest pain. 30 tablet 0    potassium chloride (MICRO-K) 10 MEQ CpSR TAKE 1 CAPSULE BY MOUTH ONCE  DAILY 100 capsule 2    spironolactone (ALDACTONE) 25 MG tablet TAKE 1 TABLET BY MOUTH ONCE  DAILY (Patient taking differently: Take 25 mg by mouth. As needed) 100 tablet 2    tiZANidine (ZANAFLEX) 4 MG tablet Take 4 mg by mouth every 6 (six) hours as needed.      VITAMIN D2 1,250 mcg (50,000 unit) capsule TAKE 1 CAPSULE BY MOUTH EVERY 7  DAYS 15 capsule 2     Current Facility-Administered Medications for the 4/28/25 encounter (Office Visit) with Russell Judd FNP   Medication Dose Route Frequency Provider Last Rate Last Admin    ketorolac injection 30 mg  30 mg Intramuscular 1 time in Clinic/HOD Russell Judd FNP           Allergies  Review of patient's allergies indicates:   Allergen Reactions    Codeine Swelling    Codeine phosphate Other (See Comments)    Tetanus and diphtheria toxoids Itching    Tetanus immune globulin Other (See Comments)       Physical Examination   /76 (BP Location: Left arm, Patient Position: Sitting)   Pulse 67   Temp 97.8 °F (36.6 °C) (Oral)   Resp 20   Ht 5' 4" (1.626 m)   Wt 81.6 kg (179 lb 12.8 oz)   LMP  (LMP Unknown)   SpO2 97%   BMI 30.86 kg/m²    Physical Exam  Vitals and nursing note reviewed.   Constitutional:       General: She is awake.      Appearance: Normal appearance.   HENT:      Head: Normocephalic.      Right Ear: Tympanic membrane, ear canal and external ear normal.      Left Ear: Tympanic membrane, ear canal and external ear normal.      Nose: Nose normal.      Mouth/Throat:      Lips: Pink.      Mouth: Mucous membranes are moist.      Pharynx: Oropharynx is clear. Uvula midline.   Cardiovascular:      Rate and Rhythm: Normal " rate and regular rhythm.      Heart sounds: Normal heart sounds, S1 normal and S2 normal.   Pulmonary:      Effort: Pulmonary effort is normal. No respiratory distress.      Breath sounds: Normal breath sounds. No decreased breath sounds, wheezing, rhonchi or rales.   Abdominal:      General: Bowel sounds are normal.      Palpations: Abdomen is soft.      Tenderness: There is no abdominal tenderness.   Musculoskeletal:      Cervical back: Normal range of motion.      Right knee: Bony tenderness present.        Legs:    Skin:     General: Skin is warm.      Capillary Refill: Capillary refill takes less than 2 seconds.   Neurological:      Mental Status: She is alert and oriented to person, place, and time.   Psychiatric:         Thought Content: Thought content does not include homicidal or suicidal ideation. Thought content does not include homicidal or suicidal plan.          Assessment and Plan (including Health Maintenance)      Problem List  Smart [place]ela  Document Outside HM   :    Plan: Will call with Xray results and any new orders        Health Maintenance Due   Topic Date Due    Hepatitis C Screening  Never done    HIV Screening  Never done    Pneumococcal Vaccines (Age 50+) (1 of 1 - PCV) Never done    Shingles Vaccine (2 of 2) 10/07/2022    COVID-19 Vaccine (5 - 2024-25 season) 09/01/2024       Problem List Items Addressed This Visit       Acute pain of right knee - Primary    Current Assessment & Plan   Right knee xray today. Will call with results and any new orders. RICE therapy. Toradol IM in clinic. RTC with worsening, new or persistent symptoms          Relevant Medications    ketorolac injection 30 mg    Other Relevant Orders    X-Ray Knee 1 or 2 View Right       Health Maintenance Topics with due status: Not Due       Topic Last Completion Date    Colorectal Cancer Screening 03/24/2023    Hemoglobin A1c (Diabetic Prevention Screening) 01/18/2024    DEXA Scan 09/16/2024    Mammogram 03/05/2025     Lipid Panel 04/14/2025    Aspirin/Antiplatelet Therapy 04/28/2025       Future Appointments   Date Time Provider Department Center   8/18/2025  9:45 AM Hector Pagan FNP [facility]   9/4/2025  8:00 AM AWV NURSE [facility]   9/29/2025  9:30 AM Pari Kirk ACNP RMOBC CARD [facility]   3/11/2026 12:40 PM [facility] MAMMO2 OB [facility] Letty            Signature:      ASHA AGUILAR [facility]  [address]  [place]  [address]       Date of encounter: 4/28/25

## 2025-04-28 NOTE — ASSESSMENT & PLAN NOTE
Right knee xray today. Will call with results and any new orders. RICE therapy. Toradol IM in clinic. RTC with worsening, new or persistent symptoms

## 2025-05-18 ENCOUNTER — PATIENT MESSAGE (OUTPATIENT)
Dept: FAMILY MEDICINE | Facility: CLINIC | Age: 65
End: 2025-05-18
Payer: MEDICARE

## 2025-05-26 DIAGNOSIS — F32.A DEPRESSION, UNSPECIFIED DEPRESSION TYPE: ICD-10-CM

## 2025-05-30 RX ORDER — BUPROPION HYDROCHLORIDE 300 MG/1
300 TABLET ORAL
Qty: 90 TABLET | Refills: 3 | Status: SHIPPED | OUTPATIENT
Start: 2025-05-30

## 2025-06-11 ENCOUNTER — RESULTS FOLLOW-UP (OUTPATIENT)
Dept: FAMILY MEDICINE | Facility: CLINIC | Age: 65
End: 2025-06-11
Payer: MEDICARE

## 2025-06-11 ENCOUNTER — OFFICE VISIT (OUTPATIENT)
Dept: FAMILY MEDICINE | Facility: CLINIC | Age: 65
End: 2025-06-11
Payer: MEDICARE

## 2025-06-11 ENCOUNTER — APPOINTMENT (OUTPATIENT)
Dept: RADIOLOGY | Facility: CLINIC | Age: 65
End: 2025-06-11
Payer: MEDICARE

## 2025-06-11 VITALS
SYSTOLIC BLOOD PRESSURE: 138 MMHG | BODY MASS INDEX: 31.31 KG/M2 | HEIGHT: 64 IN | RESPIRATION RATE: 20 BRPM | WEIGHT: 183.38 LBS | HEART RATE: 68 BPM | TEMPERATURE: 97 F | OXYGEN SATURATION: 96 % | DIASTOLIC BLOOD PRESSURE: 78 MMHG

## 2025-06-11 DIAGNOSIS — M54.30 SCIATICA, UNSPECIFIED LATERALITY: Primary | ICD-10-CM

## 2025-06-11 DIAGNOSIS — M54.30 SCIATICA, UNSPECIFIED LATERALITY: ICD-10-CM

## 2025-06-11 DIAGNOSIS — M54.9 DORSALGIA, UNSPECIFIED: Primary | ICD-10-CM

## 2025-06-11 DIAGNOSIS — M54.9 BACK PAIN, UNSPECIFIED BACK LOCATION, UNSPECIFIED BACK PAIN LATERALITY, UNSPECIFIED CHRONICITY: ICD-10-CM

## 2025-06-11 LAB
BILIRUB SERPL-MCNC: NORMAL MG/DL
BLOOD URINE, POC: NORMAL
CLARITY, UA: CLEAR
COLOR, UA: YELLOW
GLUCOSE UR QL STRIP: NORMAL
KETONES UR QL STRIP: NORMAL
LEUKOCYTE ESTERASE URINE, POC: NORMAL
NITRITE, POC UA: NORMAL
PH, POC UA: 6
PROTEIN, POC: NORMAL
SPECIFIC GRAVITY, POC UA: 1.01
UROBILINOGEN, POC UA: 0.2

## 2025-06-11 PROCEDURE — 72100 X-RAY EXAM L-S SPINE 2/3 VWS: CPT | Mod: 26,,, | Performed by: RADIOLOGY

## 2025-06-11 PROCEDURE — 72100 X-RAY EXAM L-S SPINE 2/3 VWS: CPT | Mod: TC,RHCUB

## 2025-06-11 PROCEDURE — 96372 THER/PROPH/DIAG INJ SC/IM: CPT | Mod: ,,,

## 2025-06-11 PROCEDURE — 3008F BODY MASS INDEX DOCD: CPT | Mod: ,,,

## 2025-06-11 PROCEDURE — 1101F PT FALLS ASSESS-DOCD LE1/YR: CPT | Mod: ,,,

## 2025-06-11 PROCEDURE — 3075F SYST BP GE 130 - 139MM HG: CPT | Mod: ,,,

## 2025-06-11 PROCEDURE — 1159F MED LIST DOCD IN RCRD: CPT | Mod: ,,,

## 2025-06-11 PROCEDURE — 1160F RVW MEDS BY RX/DR IN RCRD: CPT | Mod: ,,,

## 2025-06-11 PROCEDURE — 3288F FALL RISK ASSESSMENT DOCD: CPT | Mod: ,,,

## 2025-06-11 PROCEDURE — 1125F AMNT PAIN NOTED PAIN PRSNT: CPT | Mod: ,,,

## 2025-06-11 PROCEDURE — 4010F ACE/ARB THERAPY RXD/TAKEN: CPT | Mod: ,,,

## 2025-06-11 PROCEDURE — 99213 OFFICE O/P EST LOW 20 MIN: CPT | Mod: 25,,,

## 2025-06-11 PROCEDURE — 3078F DIAST BP <80 MM HG: CPT | Mod: ,,,

## 2025-06-11 RX ORDER — DEXAMETHASONE SODIUM PHOSPHATE 4 MG/ML
4 INJECTION, SOLUTION INTRA-ARTICULAR; INTRALESIONAL; INTRAMUSCULAR; INTRAVENOUS; SOFT TISSUE
Status: COMPLETED | OUTPATIENT
Start: 2025-06-11 | End: 2025-06-11

## 2025-06-11 RX ORDER — KETOROLAC TROMETHAMINE 30 MG/ML
30 INJECTION, SOLUTION INTRAMUSCULAR; INTRAVENOUS
Status: COMPLETED | OUTPATIENT
Start: 2025-06-11 | End: 2025-06-11

## 2025-06-11 RX ADMIN — DEXAMETHASONE SODIUM PHOSPHATE 4 MG: 4 INJECTION, SOLUTION INTRA-ARTICULAR; INTRALESIONAL; INTRAMUSCULAR; INTRAVENOUS; SOFT TISSUE at 09:06

## 2025-06-11 RX ADMIN — KETOROLAC TROMETHAMINE 30 MG: 30 INJECTION, SOLUTION INTRAMUSCULAR; INTRAVENOUS at 09:06

## 2025-06-11 NOTE — PROGRESS NOTES
HPI:   Becki Mejia is a pleasant 64 y.o. patient who reports to clinic with complaints of Low back pain radiating to the right leg and groin, shooting pain, constant, for about 1 week, used tens unit and antiinflammatory pills, lidocaine cream and patches, those things have not helped. She said the tens unit does help when it is applied, but when she removes it, it starts hurting right away. She has a history of two ruptured disc, she had back surgery over 10 years ago. She has went to the pain clinic and got injections but hasn't in about 5 years. She states that the right side of her back hurts worse and it radiates to her groin to her leg as well. She does state it is constant. She denies any other issues. She denies any shortness of breath or chest pain.                      Past Medical History:   Diagnosis Date    Coronary artery disease     Depression     Diverticulosis     Fibromyalgia     Fibromyalgia     Heart disease     Hyperlipidemia     Hypertension     Hypothyroidism     Mass of adrenal gland     Osteopenia     Ventral hernia        PAST SURGICAL HISTORY:   Past Surgical History:   Procedure Laterality Date    ADENOIDECTOMY      ANKLE FRACTURE SURGERY Right     shattered ankle    APPENDECTOMY      CARDIAC SURGERY      Open Heart Surgery    CORONARY ARTERY BYPASS GRAFT      HIP FRACTURE SURGERY Right     HYSTERECTOMY      TOTAL    KNEE SURGERY Right     meniscus tear    OOPHORECTOMY      SPINE SURGERY      L4 and L5    TIBIA FRACTURE SURGERY Right     TONSILLECTOMY         MEDICATIONS:  Current Medications[1]    ALLERGIES:   Review of patient's allergies indicates:   Allergen Reactions    Codeine Swelling    Codeine phosphate Other (See Comments)    Codeine sulfate     Tetanus and diphtheria toxoids Itching    Tetanus immune globulin Other (See Comments)    Tetanus toxoid          Review of Systems   Constitutional: Negative.  Negative for activity change, chills and fever.   HENT: Negative.   Negative for drooling and nosebleeds.    Eyes: Negative.    Respiratory: Negative.  Negative for chest tightness.    Cardiovascular: Negative.  Negative for chest pain and palpitations.   Gastrointestinal: Negative.    Endocrine: Negative.    Genitourinary: Negative.  Negative for difficulty urinating.   Musculoskeletal: Negative.    Integumentary:  Negative.   Allergic/Immunologic: Negative.    Neurological: Negative.  Negative for dizziness.   Hematological: Negative.    Psychiatric/Behavioral: Negative.     All other systems reviewed and are negative.         Physical Exam  Constitutional:       General: She is not in acute distress.     Appearance: Normal appearance. She is well-developed. She is not ill-appearing.   HENT:      Head: Normocephalic and atraumatic.      Right Ear: Tympanic membrane normal.      Left Ear: Tympanic membrane normal.      Nose: Nose normal.      Mouth/Throat:      Mouth: Mucous membranes are moist.      Pharynx: Oropharynx is clear. No posterior oropharyngeal erythema.   Cardiovascular:      Rate and Rhythm: Normal rate and regular rhythm.      Pulses: Normal pulses.      Heart sounds: Normal heart sounds.   Pulmonary:      Effort: Pulmonary effort is normal. No accessory muscle usage or respiratory distress.      Breath sounds: Normal breath sounds.   Abdominal:      General: Abdomen is flat. Bowel sounds are normal. There is no distension.      Palpations: Abdomen is soft.      Tenderness: There is no abdominal tenderness.   Musculoskeletal:         General: Normal range of motion.      Cervical back: Normal range of motion.      Lumbar back: Tenderness present.   Skin:     General: Skin is warm and dry.      Capillary Refill: Capillary refill takes less than 2 seconds.   Neurological:      Mental Status: She is alert and oriented to person, place, and time. Mental status is at baseline.   Psychiatric:         Mood and Affect: Mood normal.         Speech: Speech normal.          "Behavior: Behavior normal. Behavior is cooperative.         Thought Content: Thought content normal.          VITAL SIGNS:   /78 (BP Location: Right arm, Patient Position: Sitting)   Pulse 68   Temp 97.3 °F (36.3 °C) (Oral)   Resp 20   Ht 5' 4" (1.626 m)   Wt 83.2 kg (183 lb 6.4 oz)   LMP  (LMP Unknown)   SpO2 96%   BMI 31.48 kg/m²       ASSESSMENT/PLAN  1. Sciatica, unspecified laterality  Assessment & Plan:  Decadron 4 mg IM  Toradol IM today  Use a warm heating pad (barrier between the bare skin and pad to prevent burns)  RTC if needed if s/s worsen or do not improve in a couple days  Stop Mobic/Volteran when taking Ibprofen  Increase fluid intake while taking these medications to avoid kidney damage.   Take tylenol. Return to the clinic if s/s do not get better.     Orders:  -     X-Ray Lumbar Spine AP And Lateral; Future; Expected date: 06/11/2025  -     ketorolac injection 30 mg  -     dexAMETHasone injection 4 mg    2. Back pain, unspecified back location, unspecified back pain laterality, unspecified chronicity  Assessment & Plan:  UA ordered- clear urine.   See sciatica plan.   Rtc if this pain continues after this treatment.   Possible MRI, pt voiced understanding.     Orders:  -     POCT URINALYSIS W/O SCOPE             There are no Patient Instructions on file for this visit.  Orders Placed This Encounter   Procedures    X-Ray Lumbar Spine AP And Lateral     Standing Status:   Future     Number of Occurrences:   1     Expected Date:   6/11/2025     Expiration Date:   6/11/2026     May the Radiologist modify the order per protocol to meet the clinical needs of the patient?:   Yes                [1]   Current Outpatient Medications:     allopurinoL (ZYLOPRIM) 100 MG tablet, TAKE 1 TABLET BY MOUTH ONCE  DAILY, Disp: 100 tablet, Rfl: 2    amLODIPine (NORVASC) 5 MG tablet, TAKE 1 TABLET BY MOUTH DAILY AS  NEEDED IF BLOOD PRESSURE IS OVER 120/80, Disp: 100 tablet, Rfl: 2    aspirin (ECOTRIN) 81 " MG EC tablet, Take 81 mg by mouth once daily., Disp: , Rfl:     atorvastatin (LIPITOR) 80 MG tablet, TAKE 1 TABLET BY MOUTH ONCE  DAILY, Disp: 100 tablet, Rfl: 2    benazepriL (LOTENSIN) 40 MG tablet, TAKE 1 TABLET BY MOUTH IN THE  EVENING, Disp: 100 tablet, Rfl: 2    buPROPion (WELLBUTRIN XL) 300 MG 24 hr tablet, TAKE 1 TABLET BY MOUTH ONCE  DAILY, Disp: 90 tablet, Rfl: 3    carvediloL (COREG) 25 MG tablet, TAKE 1 TABLET BY MOUTH TWICE  DAILY, Disp: 200 tablet, Rfl: 2    diclofenac (VOLTAREN) 75 MG EC tablet, TAKE 1 TABLET BY MOUTH TWICE  DAILY, Disp: 200 tablet, Rfl: 2    diphenhydrAMINE (SOMINEX) 25 mg tablet, Take 25 mg by mouth nightly as needed for Insomnia., Disp: , Rfl:     ezetimibe (ZETIA) 10 mg tablet, Take 1 tablet (10 mg total) by mouth once daily., Disp: 30 tablet, Rfl: 1    FEROSUL 325 mg (65 mg iron) Tab tablet, TAKE 1 TABLET ONE TIME DAILY, Disp: 90 tablet, Rfl: 1    levothyroxine (SYNTHROID) 50 MCG tablet, TAKE 1 TABLET BY MOUTH BEFORE  BREAKFAST, Disp: 100 tablet, Rfl: 2    nitroGLYCERIN (NITROSTAT) 0.4 MG SL tablet, Place 1 tablet (0.4 mg total) under the tongue every 5 (five) minutes as needed for Chest pain., Disp: 30 tablet, Rfl: 0    potassium chloride (MICRO-K) 10 MEQ CpSR, TAKE 1 CAPSULE BY MOUTH ONCE  DAILY, Disp: 100 capsule, Rfl: 2    spironolactone (ALDACTONE) 25 MG tablet, TAKE 1 TABLET BY MOUTH ONCE  DAILY, Disp: 100 tablet, Rfl: 2    tiZANidine (ZANAFLEX) 4 MG tablet, Take 4 mg by mouth every 6 (six) hours as needed., Disp: , Rfl:     VITAMIN D2 1,250 mcg (50,000 unit) capsule, TAKE 1 CAPSULE BY MOUTH EVERY 7  DAYS, Disp: 15 capsule, Rfl: 2    naproxen (NAPROSYN) 500 MG tablet, Take 1 tablet twice daily as needed for headache, not more than 3 days of the week (Patient not taking: Reported on 6/11/2025), Disp: 20 tablet, Rfl: 1  No current facility-administered medications for this visit.

## 2025-06-11 NOTE — ASSESSMENT & PLAN NOTE
Decadron 4 mg IM  Toradol IM today  Use a warm heating pad (barrier between the bare skin and pad to prevent burns)  RTC if needed if s/s worsen or do not improve in a couple days  Stop Mobic/Volteran when taking Ibprofen  Increase fluid intake while taking these medications to avoid kidney damage.   Take tylenol. Return to the clinic if s/s do not get better.

## 2025-06-11 NOTE — PROGRESS NOTES
She has degenerative disc changes at every level and degenerative facet changes are present form L2 through S1. We will order an MRI.

## 2025-06-11 NOTE — ASSESSMENT & PLAN NOTE
UA ordered- clear urine.   See sciatica plan.   Rtc if this pain continues after this treatment.   Possible MRI, pt voiced understanding.

## 2025-06-17 ENCOUNTER — PATIENT MESSAGE (OUTPATIENT)
Dept: FAMILY MEDICINE | Facility: CLINIC | Age: 65
End: 2025-06-17
Payer: MEDICARE

## 2025-06-17 RX ORDER — EZETIMIBE 10 MG/1
10 TABLET ORAL DAILY
Qty: 30 TABLET | Refills: 1 | Status: SHIPPED | OUTPATIENT
Start: 2025-06-17 | End: 2026-06-17

## 2025-06-20 ENCOUNTER — HOSPITAL ENCOUNTER (OUTPATIENT)
Dept: RADIOLOGY | Facility: HOSPITAL | Age: 65
Discharge: HOME | End: 2025-06-20
Payer: MEDICARE

## 2025-06-20 DIAGNOSIS — M54.9 DORSALGIA, UNSPECIFIED: ICD-10-CM

## 2025-06-20 PROCEDURE — 72148 MRI LUMBAR SPINE W/O DYE: CPT | Mod: TC

## 2025-06-23 ENCOUNTER — RESULTS FOLLOW-UP (OUTPATIENT)
Dept: FAMILY MEDICINE | Facility: CLINIC | Age: 65
End: 2025-06-23
Payer: MEDICARE

## 2025-06-23 DIAGNOSIS — R93.7 ABNORMAL BONE XRAY: Primary | ICD-10-CM

## 2025-06-23 DIAGNOSIS — M51.369 L4-L5 DISC BULGE: Primary | ICD-10-CM

## 2025-06-23 NOTE — PROGRESS NOTES
We will do a referral to Dr. Solorzano in [place] a spine surgeon unless she has preference to see someone different. He can help us further evaluate and come up with a plan.     Results: Dehydration of all the lumbar disc.  Posterior osteophyte with disc bulges extending from the T12-L1 to L4-5 levels.  Broad extrusion L3-4 causing significant bilateral foraminal narrowing and mild canal stenosis.  Bilobed protrusions at L1-2 and L2-3 levels and broad bulge L4-5 as above with facet hypertrophy.

## 2025-06-24 ENCOUNTER — PATIENT MESSAGE (OUTPATIENT)
Dept: FAMILY MEDICINE | Facility: CLINIC | Age: 65
End: 2025-06-24
Payer: MEDICARE

## 2025-07-03 RX ORDER — GABAPENTIN 300 MG/1
300 CAPSULE ORAL 3 TIMES DAILY
COMMUNITY
Start: 2025-06-30

## 2025-08-07 ENCOUNTER — PATIENT MESSAGE (OUTPATIENT)
Dept: ADMINISTRATIVE | Facility: OTHER | Age: 65
End: 2025-08-07
Payer: MEDICARE

## 2025-08-29 ENCOUNTER — OFFICE VISIT (OUTPATIENT)
Dept: NEUROLOGY | Facility: CLINIC | Age: 65
End: 2025-08-29
Payer: MEDICARE

## 2025-08-29 VITALS
BODY MASS INDEX: 30.25 KG/M2 | SYSTOLIC BLOOD PRESSURE: 129 MMHG | DIASTOLIC BLOOD PRESSURE: 62 MMHG | WEIGHT: 177.19 LBS | HEART RATE: 59 BPM | RESPIRATION RATE: 18 BRPM | HEIGHT: 64 IN | OXYGEN SATURATION: 97 %

## 2025-08-29 DIAGNOSIS — G43.001 MIGRAINE WITHOUT AURA AND WITH STATUS MIGRAINOSUS, NOT INTRACTABLE: Primary | ICD-10-CM

## 2025-08-29 DIAGNOSIS — F41.9 ANXIETY: ICD-10-CM

## 2025-08-29 DIAGNOSIS — G31.84 MILD COGNITIVE IMPAIRMENT: ICD-10-CM

## 2025-08-29 PROCEDURE — 99213 OFFICE O/P EST LOW 20 MIN: CPT | Mod: S$PBB,,, | Performed by: NURSE PRACTITIONER

## 2025-08-29 PROCEDURE — 1125F AMNT PAIN NOTED PAIN PRSNT: CPT | Mod: CPTII,,, | Performed by: NURSE PRACTITIONER

## 2025-08-29 PROCEDURE — 1160F RVW MEDS BY RX/DR IN RCRD: CPT | Mod: CPTII,,, | Performed by: NURSE PRACTITIONER

## 2025-08-29 PROCEDURE — 99215 OFFICE O/P EST HI 40 MIN: CPT | Mod: PBBFAC | Performed by: NURSE PRACTITIONER

## 2025-08-29 PROCEDURE — 1159F MED LIST DOCD IN RCRD: CPT | Mod: CPTII,,, | Performed by: NURSE PRACTITIONER

## 2025-08-29 PROCEDURE — 3288F FALL RISK ASSESSMENT DOCD: CPT | Mod: CPTII,,, | Performed by: NURSE PRACTITIONER

## 2025-08-29 PROCEDURE — 4010F ACE/ARB THERAPY RXD/TAKEN: CPT | Mod: CPTII,,, | Performed by: NURSE PRACTITIONER

## 2025-08-29 PROCEDURE — 3074F SYST BP LT 130 MM HG: CPT | Mod: CPTII,,, | Performed by: NURSE PRACTITIONER

## 2025-08-29 PROCEDURE — 99999 PR PBB SHADOW E&M-EST. PATIENT-LVL V: CPT | Mod: PBBFAC,,, | Performed by: NURSE PRACTITIONER

## 2025-08-29 PROCEDURE — 3008F BODY MASS INDEX DOCD: CPT | Mod: CPTII,,, | Performed by: NURSE PRACTITIONER

## 2025-08-29 PROCEDURE — 3078F DIAST BP <80 MM HG: CPT | Mod: CPTII,,, | Performed by: NURSE PRACTITIONER

## 2025-08-29 PROCEDURE — 1101F PT FALLS ASSESS-DOCD LE1/YR: CPT | Mod: CPTII,,, | Performed by: NURSE PRACTITIONER

## 2025-08-29 RX ORDER — TRAMADOL HYDROCHLORIDE 50 MG/1
TABLET, FILM COATED ORAL
COMMUNITY
Start: 2025-08-01

## 2025-08-29 RX ORDER — NAPROXEN 500 MG/1
TABLET ORAL
Qty: 20 TABLET | Refills: 1 | Status: SHIPPED | OUTPATIENT
Start: 2025-08-29

## 2025-09-04 ENCOUNTER — OFFICE VISIT (OUTPATIENT)
Dept: ORTHOPEDICS | Facility: CLINIC | Age: 65
End: 2025-09-04
Payer: MEDICARE

## 2025-09-04 VITALS
SYSTOLIC BLOOD PRESSURE: 162 MMHG | HEART RATE: 68 BPM | WEIGHT: 177 LBS | OXYGEN SATURATION: 97 % | HEIGHT: 64 IN | BODY MASS INDEX: 30.22 KG/M2 | DIASTOLIC BLOOD PRESSURE: 77 MMHG

## 2025-09-04 DIAGNOSIS — M79.671 RIGHT FOOT PAIN: Primary | ICD-10-CM

## 2025-09-04 PROCEDURE — 99215 OFFICE O/P EST HI 40 MIN: CPT | Mod: PBBFAC

## 2025-09-04 PROCEDURE — 3077F SYST BP >= 140 MM HG: CPT | Mod: CPTII,,,

## 2025-09-04 PROCEDURE — 1159F MED LIST DOCD IN RCRD: CPT | Mod: CPTII,,,

## 2025-09-04 PROCEDURE — 99999 PR PBB SHADOW E&M-EST. PATIENT-LVL V: CPT | Mod: PBBFAC,,,

## 2025-09-04 PROCEDURE — 3288F FALL RISK ASSESSMENT DOCD: CPT | Mod: CPTII,,,

## 2025-09-04 PROCEDURE — 3078F DIAST BP <80 MM HG: CPT | Mod: CPTII,,,

## 2025-09-04 PROCEDURE — 4010F ACE/ARB THERAPY RXD/TAKEN: CPT | Mod: CPTII,,,

## 2025-09-04 PROCEDURE — 99213 OFFICE O/P EST LOW 20 MIN: CPT | Mod: S$PBB,,,

## 2025-09-04 PROCEDURE — 1100F PTFALLS ASSESS-DOCD GE2>/YR: CPT | Mod: CPTII,,,

## 2025-09-04 PROCEDURE — 3008F BODY MASS INDEX DOCD: CPT | Mod: CPTII,,,

## 2025-09-04 RX ORDER — IBUPROFEN 800 MG/1
800 TABLET, FILM COATED ORAL 3 TIMES DAILY
Qty: 42 TABLET | Refills: 0 | Status: SHIPPED | OUTPATIENT
Start: 2025-09-04 | End: 2025-09-18